# Patient Record
Sex: FEMALE | Race: WHITE | Employment: UNEMPLOYED | URBAN - METROPOLITAN AREA
[De-identification: names, ages, dates, MRNs, and addresses within clinical notes are randomized per-mention and may not be internally consistent; named-entity substitution may affect disease eponyms.]

---

## 2021-07-26 ENCOUNTER — OFFICE VISIT (OUTPATIENT)
Dept: FAMILY MEDICINE CLINIC | Age: 26
End: 2021-07-26
Payer: MEDICAID

## 2021-07-26 ENCOUNTER — TELEPHONE (OUTPATIENT)
Dept: FAMILY MEDICINE CLINIC | Age: 26
End: 2021-07-26

## 2021-07-26 VITALS
OXYGEN SATURATION: 100 % | HEIGHT: 66 IN | WEIGHT: 186 LBS | HEART RATE: 96 BPM | TEMPERATURE: 99.3 F | SYSTOLIC BLOOD PRESSURE: 137 MMHG | DIASTOLIC BLOOD PRESSURE: 82 MMHG | RESPIRATION RATE: 18 BRPM | BODY MASS INDEX: 29.89 KG/M2

## 2021-07-26 DIAGNOSIS — F41.1 GAD (GENERALIZED ANXIETY DISORDER): ICD-10-CM

## 2021-07-26 DIAGNOSIS — Z31.69 PRE-CONCEPTION COUNSELING: ICD-10-CM

## 2021-07-26 DIAGNOSIS — K21.9 GASTROESOPHAGEAL REFLUX DISEASE, UNSPECIFIED WHETHER ESOPHAGITIS PRESENT: Primary | ICD-10-CM

## 2021-07-26 DIAGNOSIS — F32.9 MAJOR DEPRESSIVE DISORDER WITH SINGLE EPISODE, REMISSION STATUS UNSPECIFIED: ICD-10-CM

## 2021-07-26 PROCEDURE — 99214 OFFICE O/P EST MOD 30 MIN: CPT | Performed by: FAMILY MEDICINE

## 2021-07-26 RX ORDER — OMEPRAZOLE 20 MG/1
CAPSULE, DELAYED RELEASE ORAL
COMMUNITY
Start: 2021-04-19 | End: 2021-07-26

## 2021-07-26 RX ORDER — OMEPRAZOLE 40 MG/1
40 CAPSULE, DELAYED RELEASE ORAL DAILY
Qty: 30 CAPSULE | Refills: 1 | Status: SHIPPED | OUTPATIENT
Start: 2021-07-26 | End: 2021-09-23 | Stop reason: SDUPTHER

## 2021-07-26 RX ORDER — MULTI-VITAMIN/MINERAL SUPPLEMENT WITH SODIUM ASCORBATE, CHOLECALCIFEROL, DI-ALPHA-TOCOPHERYL ACETATE, THIAMINE MONONITRATE, RIBOFLAVIN, NIACINAMIDE, PYRIDOXINE HCL, FOLIC ACID, CYANOCOBALAMIN, CALCIUM FORMATE, CALCIUM CARBONATE, FERROUS (II) BIS-GLYCINATE CHELATE, POTASSIUM IODIDE, ZINC OXIDE, AND CHOLINE BITARTRATE 50; 155; 45; 32; 55; 30; 3; 1; 20; 10; 100; 10; 120; 3; 450 MG/1; MG/1; MG/1; MG/1; MG/1; [IU]/1; MG/1; MG/1; MG/1; UG/1; UG/1; MG/1; MG/1; MG/1; [IU]/1
1 TABLET, FILM COATED ORAL DAILY
Qty: 90 TABLET | Status: SHIPPED | OUTPATIENT
Start: 2021-07-26 | End: 2021-07-27 | Stop reason: SDUPTHER

## 2021-07-26 RX ORDER — FAMOTIDINE 20 MG/1
TABLET, FILM COATED ORAL
COMMUNITY
Start: 2021-05-26 | End: 2021-07-26 | Stop reason: ALTCHOICE

## 2021-07-26 NOTE — PROGRESS NOTES
Chief Complaint   Patient presents with   [employer] Establish Care    Medication Refill     1. Have you been to the ER, urgent care clinic since your last visit? Hospitalized since your last visit? No    2. Have you seen or consulted any other health care providers outside of the [address] [place] [address] since your last visit? Include any pap smears or colon screening.  No

## 2021-07-26 NOTE — PATIENT INSTRUCTIONS
Learning About Planning for Future Pregnancy  How can you plan for pregnancy? Even before you get pregnant, you can help make your pregnancy as healthy as possible. Take these steps:  · See a doctor or certified nurse-midwife for an exam. Talk about the medicines, vitamins, and herbs you take. Discuss any health problems or concerns you have. · Don't take nonsteroidal anti-inflammatory drugs (NSAIDs) unless your doctor says it's okay. Examples of these are ibuprofen and aspirin. They can raise your risk of miscarriage. · Take a daily multivitamin or prenatal vitamin. Make sure it has folic acid. This will lower the chance of having a baby with a birth defect. · Keep track of your menstrual cycle. This is a good idea for a few reasons. It helps you know the best time to try to get pregnant. And it can help your doctor or midwife figure out when your baby is due and how it is growing. · Make healthy choices. Eat well. Avoid caffeine. Or cut back and only have 1 cup of coffee or tea a day. Avoid alcohol, cigarettes, marijuana, and illegal drugs. Take only the medicines your doctor or midwife says are okay. · Get plenty of exercise. A strong body will make pregnancy and birth easier. It will also help you recover after the birth. And exercise can help improve your mood. What other exams or tests should you have? Before trying to get pregnant, take care of any other health concerns you might have. · If you have diabetes or high blood pressure or you are obese, talk to your doctor before you get pregnant. Together, you can make a plan about how to control these health problems. · Talk with your doctor about any medicines you take. Find out if it is safe to keep taking them while you are pregnant. · Get any vaccines you might need. They can help prevent birth defects, miscarriage, or stillbirth that can be caused by infections such as rubella or measles.  Ask your doctor how long you should wait after you get a vaccine before you try to get pregnant. · Talk with your doctor about whether to have screening tests for diseases that are passed down through families (genetic conditions). These diseases include:  ? Cystic fibrosis. ? Sickle cell disease. ? Ivan-Sachs disease. If you think you might be pregnant  · You can use a home pregnancy test as soon as the first day of your first missed menstrual period. · As soon as you know you're pregnant, make an appointment with your doctor or certified nurse-midwife. Your first prenatal visit will provide information that can be used to check for any problems as your pregnancy progresses. Where can you learn more? Go to http://www.gray.com/  Enter P420 in the search box to learn more about \"Learning About Planning for Future Pregnancy. \"  Current as of: October 8, 2020               Content Version: 12.8  © 2006-2021 [employer]. Care instructions adapted under license by [employer] (which disclaims liability or warranty for this information). If you have questions about a medical condition or this instruction, always ask your healthcare professional. [place] [address] any warranty or liability for your use of this information. Learning About Planning for Future Pregnancy  How can you plan for pregnancy? Even before you get pregnant, you can help make your pregnancy as healthy as possible. Take these steps:  · See a doctor or certified nurse-midwife for an exam. Talk about the medicines, vitamins, and herbs you take. Discuss any health problems or concerns you have. · Don't take nonsteroidal anti-inflammatory drugs (NSAIDs) unless your doctor says it's okay. Examples of these are ibuprofen and aspirin. They can raise your risk of miscarriage. · Take a daily multivitamin or prenatal vitamin. Make sure it has folic acid. This will lower the chance of having a baby with a birth defect.   · Keep track of your menstrual cycle. This is a good idea for a few reasons. It helps you know the best time to try to get pregnant. And it can help your doctor or midwife figure out when your baby is due and how it is growing. · Make healthy choices. Eat well. Avoid caffeine. Or cut back and only have 1 cup of coffee or tea a day. Avoid alcohol, cigarettes, marijuana, and illegal drugs. Take only the medicines your doctor or midwife says are okay. · Get plenty of exercise. A strong body will make pregnancy and birth easier. It will also help you recover after the birth. And exercise can help improve your mood. What other exams or tests should you have? Before trying to get pregnant, take care of any other health concerns you might have. · If you have diabetes or high blood pressure or you are obese, talk to your doctor before you get pregnant. Together, you can make a plan about how to control these health problems. · Talk with your doctor about any medicines you take. Find out if it is safe to keep taking them while you are pregnant. · Get any vaccines you might need. They can help prevent birth defects, miscarriage, or stillbirth that can be caused by infections such as rubella or measles. Ask your doctor how long you should wait after you get a vaccine before you try to get pregnant. · Talk with your doctor about whether to have screening tests for diseases that are passed down through families (genetic conditions). These diseases include:  ? Cystic fibrosis. ? Sickle cell disease. ? Ivan-Sachs disease. If you think you might be pregnant  · You can use a home pregnancy test as soon as the first day of your first missed menstrual period. · As soon as you know you're pregnant, make an appointment with your doctor or certified nurse-midwife. Your first prenatal visit will provide information that can be used to check for any problems as your pregnancy progresses. Where can you learn more?   Go to http://www.gray.com/  Enter O494 in the search box to learn more about \"Learning About Planning for Future Pregnancy. \"  Current as of: October 8, 2020               Content Version: 12.8  © 2006-2021 Healthwise, [facility]. Care instructions adapted under license by [employer] (which disclaims liability or warranty for this information). If you have questions about a medical condition or this instruction, always ask your healthcare professional. [place] [address] any warranty or liability for your use of this information.

## 2021-07-26 NOTE — TELEPHONE ENCOUNTER
----- Message from Yoav Shore sent at 7/26/2021 12:21 PM EDT -----  Regarding: Dr. Janeece Opitz Message/Vendor Calls    Caller's first and last name: Pt      Reason for call: Requesting a call back concerning prescriptions coverage      Callback required yes/no and why: yes      Best contact number(s): [phone]      Details to clarify the request:      Yoav Shore

## 2021-07-26 NOTE — PROGRESS NOTES
HISTORY OF PRESENT ILLNESS  Brendan Crespo is a 32 y.o. female. Patient presents with:  Establish Care  Medication Refill    She needs refills on her Prilosec and Pepcid for her gastroesophageal reflux disease. Also, she is trying to conceive. She needs a new psychiatrist for her depression and LANCE. Review of Systems   Constitutional: Negative for weight loss. Respiratory: Negative for cough and shortness of breath. Cardiovascular: Negative for chest pain. Gastrointestinal: Negative for abdominal pain, heartburn, nausea and vomiting. Visit Vitals  /82 (BP 1 Location: Left arm, BP Patient Position: Sitting, BP Cuff Size: Adult)   Pulse 96   Temp 99.3 °F (37.4 °C) (Tympanic)   Resp 18   Ht 5' 6\" (1.676 m)   Wt 186 lb (84.4 kg)   LMP 07/15/2021   SpO2 100%   BMI 30.02 kg/m²     Physical Exam  Vitals and nursing note reviewed. Constitutional:       General: She is not in acute distress. Appearance: Normal appearance. She is well-developed. She is not diaphoretic. Cardiovascular:      Rate and Rhythm: Normal rate and regular rhythm. Heart sounds: Normal heart sounds. No murmur heard. No friction rub. No gallop. Pulmonary:      Effort: Pulmonary effort is normal. No respiratory distress. Breath sounds: Normal breath sounds. No wheezing or rales. Abdominal:      General: Bowel sounds are normal. There is no distension. Palpations: Abdomen is soft. Tenderness: There is no abdominal tenderness. There is no guarding or rebound. Skin:     General: Skin is warm and dry. Neurological:      Mental Status: She is alert and oriented to person, place, and time. ASSESSMENT and PLAN    ICD-10-CM ICD-9-CM    1. Gastroesophageal reflux disease, unspecified whether esophagitis present  K21.9 530.81 omeprazole (PRILOSEC) 40 mg capsule   2. Pre-conception counseling  Z31.69 V26.49 prenatal vit86-iron-folic acid (Nestabs) 01-4,584 mg-mcg tab   3.  LANCE (generalized anxiety disorder)  F41.1 300.02 REFERRAL TO PSYCHIATRY   4. Major depressive disorder with single episode, remission status unspecified  F32.9 296.20 REFERRAL TO PSYCHIATRY       Controlled gastroesophageal reflux disease  Trying to conceive  Stop Pepcid and increase Prilosec as this can be safer in pregnancy  PNV  Pre-conception planning done  Psychiatry referral    Follow-up and Dispositions    · Return in about 2 months (around 9/26/2021) for GERD. Reviewed plan of care. Patient has provided input and agrees with goals.

## 2021-07-26 NOTE — TELEPHONE ENCOUNTER
Pt called to check on status of prior auth request for prenatal vitamins prescribed today, called her insurance company and was advised to have PCP call [phone] for approval.     Pt also states Dr. Kwaku Ludwig told her this medication was to help the baby grow. Pt seems confused about whether or not she is pregnant and wants leeann back from nurse to advise since she's been spotting, but can't remember her last period, although noting her breasts and stomach are getting larger.  Harjeet

## 2021-07-26 NOTE — TELEPHONE ENCOUNTER
Pt called office to let Dr Ronit Melo know that Dr Maxwell Juarez does not take medicaid. She will need to be referred to someone else. Encouraged patient to to call insurance to see who is in network but she seemed confused about doing that.

## 2021-07-27 DIAGNOSIS — Z31.69 PRE-CONCEPTION COUNSELING: ICD-10-CM

## 2021-07-27 RX ORDER — MULTI-VITAMIN/MINERAL SUPPLEMENT WITH SODIUM ASCORBATE, CHOLECALCIFEROL, DI-ALPHA-TOCOPHERYL ACETATE, THIAMINE MONONITRATE, RIBOFLAVIN, NIACINAMIDE, PYRIDOXINE HCL, FOLIC ACID, CYANOCOBALAMIN, CALCIUM FORMATE, CALCIUM CARBONATE, FERROUS (II) BIS-GLYCINATE CHELATE, POTASSIUM IODIDE, ZINC OXIDE, AND CHOLINE BITARTRATE 50; 155; 45; 32; 55; 30; 3; 1; 20; 10; 100; 10; 120; 3; 450 MG/1; MG/1; MG/1; MG/1; MG/1; [IU]/1; MG/1; MG/1; MG/1; UG/1; UG/1; MG/1; MG/1; MG/1; [IU]/1
1 TABLET, FILM COATED ORAL DAILY
Qty: 90 TABLET | Status: SHIPPED | OUTPATIENT
Start: 2021-07-27 | End: 2022-08-18

## 2021-07-27 NOTE — TELEPHONE ENCOUNTER
After verifying patient's ID, advised patient per Dr Denisha Dale she is not pregnant, but she needs to take the PNV in case she gets pregnant. Order for prescription PNV sent to pharmacy. Patient states that insurance will not cover the prenatal vitamins.

## 2021-07-27 NOTE — TELEPHONE ENCOUNTER
----- Message from Krissy Prescott sent at 7/27/2021  1:06 PM EDT -----  Regarding: Dr. Loan Steve: [phone]  General Message/Vendor Calls    Caller's first and last name: Pt.      Reason for call: Needs authorization for medication \"Nestabs\". Callback required yes/no and why: Yes, confirm authorization sent. Best contact number(s): [phone]      Details to clarify the request: N/a.       Krissy Prescott

## 2021-07-27 NOTE — TELEPHONE ENCOUNTER
Please call her and let her know she is not pregnant, but she needs to take the PNV in case she gets pregnant. Order for prescription PNV sent to pharmacy.

## 2021-08-02 ENCOUNTER — TELEPHONE (OUTPATIENT)
Dept: FAMILY MEDICINE CLINIC | Age: 26
End: 2021-08-02

## 2021-08-02 NOTE — TELEPHONE ENCOUNTER
Called pt, and left a voice message, advising I was returning her call and asked that she call the office back when able.

## 2021-08-02 NOTE — TELEPHONE ENCOUNTER
Pt called again about the Nestab prenatal vitamins costing 200 hundred dollars, needing prior authorization from insurance.

## 2021-08-10 ENCOUNTER — TELEPHONE (OUTPATIENT)
Dept: FAMILY MEDICINE CLINIC | Age: 26
End: 2021-08-10

## 2021-08-10 NOTE — TELEPHONE ENCOUNTER
----- Message from Reese Gannon sent at 8/10/2021  8:08 AM EDT -----  Regarding: /Telephone  Contact: [phone]  General Message/Vendor Calls    Caller's first and last name: Pt.       Reason for call: Needs to speak to nurse, pt took 3 pregnancy tests and all came back positive. Callback required yes/no and why: Yes, call back from nurse. Best contact number(s): [phone]      Details to clarify the request: N/a.       Reese Gannon

## 2021-08-10 NOTE — TELEPHONE ENCOUNTER
Returned pt's call and she has been provided office number for [facility] to call and schedule her inital prenatal appointment.

## 2021-09-02 ENCOUNTER — INITIAL PRENATAL (OUTPATIENT)
Dept: OBGYN CLINIC | Age: 26
End: 2021-09-02

## 2021-09-02 VITALS
HEIGHT: 66 IN | WEIGHT: 193.2 LBS | HEART RATE: 102 BPM | SYSTOLIC BLOOD PRESSURE: 129 MMHG | DIASTOLIC BLOOD PRESSURE: 85 MMHG | BODY MASS INDEX: 31.05 KG/M2

## 2021-09-02 DIAGNOSIS — Z32.01 POSITIVE PREGNANCY TEST: ICD-10-CM

## 2021-09-02 DIAGNOSIS — Z11.3 SCREENING EXAMINATION FOR VENEREAL DISEASE: ICD-10-CM

## 2021-09-02 DIAGNOSIS — Z12.4 ENCOUNTER FOR PAPANICOLAOU SMEAR FOR CERVICAL CANCER SCREENING: Primary | ICD-10-CM

## 2021-09-02 DIAGNOSIS — N92.6 IRREGULAR MENSES: ICD-10-CM

## 2021-09-02 LAB
ABO + RH BLD: NORMAL
BLOOD BANK CMNT PATIENT-IMP: NORMAL
BLOOD GROUP ANTIBODIES SERPL: NORMAL
HCG SERPL-ACNC: <1 MIU/ML (ref 0–6)
HCG URINE, QL. (POC): NEGATIVE
SPECIMEN EXP DATE BLD: NORMAL
VALID INTERNAL CONTROL?: YES

## 2021-09-02 PROCEDURE — 99202 OFFICE O/P NEW SF 15 MIN: CPT | Performed by: OBSTETRICS & GYNECOLOGY

## 2021-09-02 PROCEDURE — 81025 URINE PREGNANCY TEST: CPT | Performed by: OBSTETRICS & GYNECOLOGY

## 2021-09-02 NOTE — PROGRESS NOTES
[address] [facility]  [email or web address]  [phone]    Cintia Maher MD, [address]       OB/GYN Problem visit    Chief Complaint:   Chief Complaint   Patient presents with    Initial Prenatal Visit       Last or next WWE is: due; new pt    History of Present Illness: This is a new problem being evaluated by this provider. First visit to GYN. The patient is a 32 y.o. No obstetric history on file. female who reports having 2 positive pregnancy tests. Stopped depo 2 years ago. Pt reports she gets implantation bleeding every 2 months. LMP was 6/30/21. She never has heavy or painful cycles. She reports the symptoms are is unchanged. Aggravating factors include none. Alleviating factors include none. She does not have other concerns. LMP: No LMP recorded. Ultrasound:  TRANSVAGINAL ULTRASOUND PERFORMED  UTERUS IS ANTEVERTED, NORMAL IN SIZE AND ECHOGENICITY. ENDOMETRIUM MEASURES 6-7MM IN THICKNESS. NO IUP IS SEEN. RIGHT OVARY APPEARS WITHIN NORMAL LIMITS. LEFT OVARY APPEARS WITHIN NORMAL LIMITS. NO FREE FLUID SEEN IN THE CDS. PFSH:  Past Medical History:   Diagnosis Date    Depression     Anxiety    LANCE (generalized anxiety disorder)     GERD (gastroesophageal reflux disease)     Hearing loss, left     Learning disability     reading     History reviewed. No pertinent surgical history.   Family History   Problem Relation Age of Onset    No Known Problems Mother     No Known Problems Father     Diabetes Paternal Grandmother     No Known Problems Half-sister     No Known Problems Half-brother     No Known Problems Half-brother      Social History     Tobacco Use    Smoking status: Never Smoker    Smokeless tobacco: Never Used   Vaping Use    Vaping Use: Never used   Substance Use Topics    Alcohol use: Never    Drug use: Never     Allergies   Allergen Reactions    Amoxicillin Hives    Grass Pollen Hives and Rash    Kiwi Hives    Penicillins Hives Current Outpatient Medications   Medication Sig    omeprazole (PRILOSEC) 40 mg capsule Take 1 Capsule by mouth daily.  prenatal vit86-iron-folic acid (Nestabs) 87-2,788 mg-mcg tab Take 1 Tablet by mouth daily. (Patient not taking: Reported on 9/2/2021)     No current facility-administered medications for this visit. Review of Systems:  History obtained from the patient  Constitutional: negative for fevers, chills and weight loss  ENT ROS: negative for - hearing change, oral lesions or visual changes  Respiratory: negative for cough, wheezing or dyspnea on exertion  Cardiovascular: negative for chest pain, irregular heart beats, exertional chest pressure/discomfort  Gastrointestinal: negative for dysphagia, nausea and vomiting  Genito-Urinary ROS:  see HPI  Inteument/breast: negative for rash, breast lump and nipple discharge  Musculoskeletal:negative for stiff joints, neck pain and muscle weakness  Endocrine ROS: negative for - breast changes, galactorrhea or temperature intolerance  Hematological and Lymphatic ROS: negative for - blood clots, bruising or swollen lymph nodes    Physical Exam:  Visit Vitals  /85 (BP 1 Location: Right upper arm)   Pulse (!) 102   Ht 5' 6\" (1.676 m)   Wt 193 lb 3.2 oz (87.6 kg)   BMI 31.18 kg/m²       GENERAL: alert, well appearing, and in no distress  HEAD: normocephalic, atraumatic.    PULM: clear to auscultation, no wheezes, rales or rhonchi, symmetric air entry   COR: normal rate and regular rhythm, S1 and S2 normal   ABDOMEN: soft, nontender, nondistended, no masses or organomegaly   EGBUS: no lesions, no inflammation, no masses  VULVA: normal appearing vulva with no masses, tenderness or lesions  VAGINA: normal appearing vagina with normal color, no lesions, thin white discharge  CERVIX: normal appearing cervix without discharge or lesions, non tender  UTERUS: uterus is normal size, shape, consistency and nontender   ADNEXA: normal adnexa in size, nontender and no masses  NEURO: alert, oriented, normal speech    Assessment:  Encounter Diagnoses   Name Primary?  Encounter for Papanicolaou smear for cervical cancer screening Yes    Screening examination for venereal disease     Positive pregnancy test     Irregular menses        Plan:  The patient is advised that she should contact the office if she does not note improvement or if symptoms recur  Recommend follow up with PCP for non-gynecologic complaints and chronic medical problems. She should contact our office with any questions or concerns  She could keep her routine annual exam appointment. We discussed potential causes of symptomatic bleeding: including but not limited to hormonal, medical, infection/inflammation and structural etiologies. Disc upt is negative, US negative for IUP  Labs  We discussed timed intercourse, menstrual charting, and s/sx of ovulation. I recommended a daily prenatal vitamin. We discussed that if conception does not occur within one year then additional evaluation may be indicated. Physician review of ultrasound performed by technician    Today's ultrasound report and images were reviewed and discussed with the patient. Please see images and imaging report entered by technician in PACS for more detail and progress note and diagnosis entered by MD.    Moo Styles MD    On this date, 9/2/2021,  I have spent 25 minutes reviewing previous notes, test results and face to face with the patient discussing the diagnosis and importance of compliance with the treatment plan as well as documenting on the day of the visit.         Orders Placed This Encounter    BETA HCG, QT    CT/NG/T.VAGINALIS AMPLIFICATION    AMB POC URINE PREGNANCY TEST, VISUAL COLOR COMPARISON    TYPE & SCREEN       Results for orders placed or performed in visit on 09/02/21   AMB POC URINE PREGNANCY TEST, VISUAL COLOR COMPARISON   Result Value Ref Range    VALID INTERNAL CONTROL POC Yes     HCG urine, Ql. (POC) Negative Negative

## 2021-09-03 ENCOUNTER — TELEPHONE (OUTPATIENT)
Dept: OBGYN CLINIC | Age: 26
End: 2021-09-03

## 2021-09-03 NOTE — TELEPHONE ENCOUNTER
Pt calling in regards to her lab results. I advised pt of her lab results and she gave verbal understanding. Pt is still having problems with her cycles and them being irregular, she says she hasn't had a cycle in 2 months and she is trying to get pregnant. I scheduled appointment with MD to talk to her about fertility and irregular cycles.

## 2021-09-04 LAB
C TRACH RRNA SPEC QL NAA+PROBE: NEGATIVE
N GONORRHOEA RRNA SPEC QL NAA+PROBE: NEGATIVE
T VAGINALIS DNA SPEC QL NAA+PROBE: NEGATIVE

## 2021-09-08 ENCOUNTER — TELEPHONE (OUTPATIENT)
Dept: OBGYN CLINIC | Age: 26
End: 2021-09-08

## 2021-09-23 DIAGNOSIS — K21.9 GASTROESOPHAGEAL REFLUX DISEASE, UNSPECIFIED WHETHER ESOPHAGITIS PRESENT: ICD-10-CM

## 2021-09-23 RX ORDER — OMEPRAZOLE 40 MG/1
40 CAPSULE, DELAYED RELEASE ORAL DAILY
Qty: 90 CAPSULE | Refills: 3 | Status: SHIPPED | OUTPATIENT
Start: 2021-09-23 | End: 2022-09-20 | Stop reason: ALTCHOICE

## 2021-12-03 ENCOUNTER — TELEPHONE (OUTPATIENT)
Dept: OBGYN CLINIC | Age: 26
End: 2021-12-03

## 2021-12-03 NOTE — TELEPHONE ENCOUNTER
~11 weeks   LMP-9/13 11/1-UPT +    Pt calling with c/o nausea, food adversion, missed menses. Pt states her LMP was 9/13. Pt took a UPT on 11/1 and it was positive. This RN scheduled pt for initial prenatal OV on 12/13 @1510 and US @1230. Pt verbalized understanding.

## 2021-12-13 ENCOUNTER — OFFICE VISIT (OUTPATIENT)
Dept: OBGYN CLINIC | Age: 26
End: 2021-12-13

## 2021-12-13 VITALS
HEART RATE: 92 BPM | SYSTOLIC BLOOD PRESSURE: 124 MMHG | DIASTOLIC BLOOD PRESSURE: 75 MMHG | BODY MASS INDEX: 30.09 KG/M2 | WEIGHT: 186.4 LBS

## 2021-12-13 DIAGNOSIS — Z34.01: Primary | ICD-10-CM

## 2021-12-13 DIAGNOSIS — O21.9 NAUSEA AND VOMITING IN PREGNANCY: ICD-10-CM

## 2021-12-13 PROCEDURE — 0500F INITIAL PRENATAL CARE VISIT: CPT | Performed by: OBSTETRICS & GYNECOLOGY

## 2021-12-13 RX ORDER — PROMETHAZINE HYDROCHLORIDE 12.5 MG/1
12.5 TABLET ORAL
Qty: 30 TABLET | Refills: 0 | Status: SHIPPED | OUTPATIENT
Start: 2021-12-13 | End: 2022-03-31

## 2021-12-13 NOTE — PATIENT INSTRUCTIONS
Learning About Pregnancy  Your Care Instructions     Your health in the early weeks of your pregnancy is particularly important for your baby's health. Take good care of yourself. Anything you do that harms your body can also harm your baby. Make sure to go to all of your doctor appointments. Regular checkups will help keep you and your baby healthy. How can you care for yourself at home? Diet    · Eat a balanced diet. Make sure your diet includes plenty of beans, peas, and leafy green vegetables.     · Do not skip meals or go for many hours without eating. If you are nauseated, try to eat a small, healthy snack every 2 to 3 hours.     · Do not eat fish that has a high level of mercury, such as shark, swordfish, or mackerel. Do not eat more than one can of tuna each week.     · Drink plenty of fluids. If you have kidney, heart, or liver disease and have to limit fluids, talk with your doctor before you increase the amount of fluids you drink.     · Cut down on caffeine, such as coffee, tea, and cola.     · Do not drink alcohol, such as beer, wine, or hard liquor.     · Take a multivitamin that contains at least 400 micrograms (mcg) of folic acid to help prevent birth defects. Fortified cereal and whole wheat bread are good additional sources of folic acid.     · Increase the calcium in your diet. Try to drink a quart of skim milk each day. You may also take calcium supplements and choose foods such as cheese and yogurt. Lifestyle    · Make sure you go to your follow-up appointments.     · Get plenty of rest. You may be unusually tired while you are pregnant.     · Get at least 30 minutes of exercise on most days of the week. Walking is a good choice. If you have not exercised in the past, start out slowly. Take several short walks each day.     · Do not smoke. If you need help quitting, talk to your doctor about stop-smoking programs.  These can increase your chances of quitting for good.     · Do not touch cat feces or litter boxes. Also, wash your hands after you handle raw meat, and fully cook all meat before you eat it. Wear gloves when you work in the yard or garden, and wash your hands well when you are done. Cat feces, raw or undercooked meat, and contaminated dirt can cause an infection that may harm your baby or lead to a miscarriage.     · Do not use saunas or hot tubs. Raising your body temperature may harm your baby.     · Avoid chemical fumes, paint fumes, or poisons.     · Do not use illegal drugs, marijuana, or alcohol. Medicines    · Review all of your medicines with your doctor. Some of your routine medicines may need to be changed to protect your baby.     · Use acetaminophen (Tylenol) to relieve minor problems, such as a mild headache or backache or a mild fever with cold symptoms. Do not use nonsteroidal anti-inflammatory drugs (NSAIDs), such as ibuprofen (Advil, Motrin) or naproxen (Aleve), unless your doctor says it is okay.     · Do not take two or more pain medicines at the same time unless the doctor told you to. Many pain medicines have acetaminophen, which is Tylenol. Too much acetaminophen (Tylenol) can be harmful.     · Take your medicines exactly as prescribed. Call your doctor if you think you are having a problem with your medicine. To manage morning sickness    · If you feel sick when you first wake up, try eating a small snack (such as crackers) before you get out of bed. Allow some time to digest the snack, and then get out of bed slowly.     · Do not skip meals or go for long periods without eating. An empty stomach can make nausea worse.     · Eat small, frequent meals instead of three large meals each day.     · Drink plenty of fluids.     · Eat foods that are high in protein but low in fat.     · If you are taking iron supplements, ask your doctor if they are necessary.  Iron can make nausea worse.     · Avoid any smells, such as coffee, that make you feel sick.     · Get lots of rest. Morning sickness may be worse when you are tired. Follow-up care is a key part of your treatment and safety. Be sure to make and go to all appointments, and call your doctor if you are having problems. It's also a good idea to know your test results and keep a list of the medicines you take. Where can you learn more? Go to http://www.gray.com/  Enter B286 in the search box to learn more about \"Learning About Pregnancy. \"  Current as of: June 16, 2021               Content Version: 13.0  © 5349-5033 Healthwise[employer]. Care instructions adapted under license by [employer] (which disclaims liability or warranty for this information). If you have questions about a medical condition or this instruction, always ask your healthcare professional. [address] any warranty or liability for your use of this information.

## 2021-12-13 NOTE — PROGRESS NOTES
[facility]  [email or web address]  [phone]    Sharron Torres MD, [address] [facility]     Chief complaint:  Irregular cycles  Last cycle; Patient's last menstrual period was 2021 (exact date). This is a new concern and an evaluation is planned. The patient has not been previously tested for hemoglobin electrophoresis, CF or SMA. Current pregnancy history:  Skye Christianson is a No obstetric history on file. , 32 y.o. female UNAVAILABLE   She presents for the evaluation of irregular menses and a positive pregnancy test.    LMP history:  Patient's last menstrual period was 2021 (exact date). .  The date of the beginning of her last menstrual period is certain. Her menses are not regular. Her cycles occur about every 8 weeks. A urine pregnancy test was positive about 6 weeks ago. She was not using contraception at the estimated time of conception. Based on her LMP, her EGA is 13 weeks,0 days with and EDC of 2022. Ultrasound data:  She had an ultrasound today which revealed a viable perez pregnancy with a gestational age of 16 weeks and 5 days giving an EDC of 2022. Pregnancy symptoms:  She reports positive home pregnancy test, nausea and vomiting, decreased appetite. She denies fatigue  breast tenderness. Since she found out she is pregnant, she has lost,  7 lbs. She reports her prepregnancy weight as 19 pounds. Relevant past pregnancy history:  She has the following pregnancy history:none. She does not have a history of  delivery. She does not have a history of a prior  section. Relevant past medical history:(relevant to this pregnancy):   none     Pap smear history:  Last pap smear: Pt. Reports that she does not remember the date  Results: pt. Reports normal.    Occupational history  Her occupation is: unemployed, disabled. Substance history:   She does not report current tobacco use.            She does not report current alcohol use. She does not report current drug use. Exposure history: There are not indoor cat(s) in the home. The patient was instructed not to change cat litter boxes during pregnancy. She does not report close contact with children on a regular basis. She has not chicken pox or the vaccine in the past.   Patient does not report issues with domestic violence. Genetic Screening/Teratology Counseling:   (Includes patient, baby's father, or anyone in either family with:)  3.  Patient's age >/= 28 at EDC?--26      FOB age: 24years old. 2.  Thalassemia ([facility], [facility], [address] [facility], or  background): MCV<80?--no  3. Neural tube defect (meningomyelocele, spina bifida, anencephaly)? --no  4. Congenital heart defect?--no  5. Down syndrome?--no  6. Ivan-Sachs ([address] [facility])? --no  7. Canavan's Disease?--no  8. Familial Dysautonomia?--no   9. Sickle cell disease or trait ()? --no   Has she been tested for sickle trait: Unknown  10. Hemophilia or other blood disorders?--no  11. Muscular dystrophy?--no  12. Cystic fibrosis? --no  13. [place]'s Chorea?--no  14. Mental retardation/autism (if yes was person tested for Fragile X)? -yes and FOB autism  15. Other inherited genetic or chromosomal disorder?- yes and fob brother: has 1.5 lungs/congenital  16. Maternal metabolic disorder (DM, PKU, etc)? --no  17. Patient or FOB with a child with a birth defect not listed above?--no  17a. Patient or FOB with a birth defect themselves?--no  25. Recurrent pregnancy loss, or stillbirth?--no  19. Any medications since LMP other than prenatal vitamins (include vitamins, supplements, OTC meds, drugs, alcohol)? --   PNV  prilosec  20. Any other genetic/environmental exposure to discuss?--no. Infection History:  1. Lives with someone with TB or TB exposed?--no  2. Patient or partner has history of genital herpes?--no  3. Rash or viral illness since LMP?--no  4.   History of STD (GC, CT, HPV, syphilis, HIV)? --no  5. Have you received a flu vaccine for the most recent flu season? -- no  6. Have you or your sexual partner(s) travelled to a [facility] area in the last six months? -- no  7. Have you received the COVID-19 vaccine? -- no    Past Medical History:   Diagnosis Date    Depression     Anxiety    LANCE (generalized anxiety disorder)     GERD (gastroesophageal reflux disease)     Hearing loss, left     Learning disability     reading     No past surgical history on file. Social History     Occupational History    Not on file   Tobacco Use    Smoking status: Never Smoker    Smokeless tobacco: Never Used   Vaping Use    Vaping Use: Never used   Substance and Sexual Activity    Alcohol use: Never    Drug use: Never    Sexual activity: Yes     Birth control/protection: None     Family History   Problem Relation Age of Onset    No Known Problems Mother     No Known Problems Father     Diabetes Paternal Grandmother     No Known Problems Half-sister     No Known Problems Half-brother     No Known Problems Half-brother      OB History   No obstetric history on file. Allergies   Allergen Reactions    Amoxicillin Hives    Grass Pollen Hives and Rash    Kiwi Hives    Penicillins Hives     Prior to Admission medications    Medication Sig Start Date End Date Taking? Authorizing Provider   omeprazole (PRILOSEC) 40 mg capsule Take 1 Capsule by mouth daily. 9/23/21  Yes Shannon Jacome MD   prenatal vit86-iron-folic acid (Nestabs) 34-4,499 mg-mcg tab Take 1 Tablet by mouth daily.  7/27/21  Yes Shannon Jacome MD        Review of Systems - History obtained from the patient  Constitutional: negative for weight loss, fever, night sweats  HEENT: negative for hearing loss, earache, congestion, snoring, sorethroat  CV: negative for chest pain, palpitations, edema  Resp: negative for cough, shortness of breath, wheezing  GI: negative for change in bowel habits, abdominal pain, black or bloody stools  : negative for frequency, dysuria, hematuria, vaginal discharge  MSK: negative for back pain, joint pain, muscle pain  Breast: negative for breast lumps, nipple discharge, galactorrhea  Skin :negative for itching, rash, hives  Neuro: negative for dizziness, headache, confusion, weakness  Psych: negative for anxiety, depression, change in mood  Heme/lymph: negative for bleeding, bruising, pallor    Objective:  Visit Vitals  BP (!) 145/84 (BP 1 Location: Right arm, BP Patient Position: Sitting, BP Cuff Size: Adult)   Pulse (!) 102   Wt 186 lb 6.4 oz (84.6 kg)   LMP 09/13/2021 (Exact Date)   BMI 30.09 kg/m²       Physical Exam:   Constitutional  · Appearance: well-nourished, well developed, alert, in no acute distress    HENT  · Head  · Face: appears normal  · Eyes: appear normal  · Ears: normal  · Mouth: normal  · Lips: no lesions    Neck  · Inspection/Palpation: normal appearance, no masses or tenderness  · Lymph Nodes: no lymphadenopathy present  · Thyroid: gland size normal, nontender, no nodules or masses present on palpation    Chest  · Respiratory Effort: breathing unlabored  · Auscultation: normal breath sounds    Cardiovascular  · Heart:  · Auscultation: regular rate and rhythm without murmur    Breasts  · Inspection of Breasts: breasts symmetrical, no skin changes, no discharge present, nipple appearance normal, no skin retraction present  · Palpation of Breasts and Axillae: no masses present on palpation, no breast tenderness  · Axillary Lymph Nodes: no lymphadenopathy present    Gastrointestinal  · Abdominal Examination: abdomen non-tender to palpation, normal bowel sounds, no masses present  · Liver and spleen: no hepatomegaly present, spleen not palpable  · Hernias: no hernias identified    Genitourinary  · External Genitalia: normal appearance for age, no discharge present, no tenderness present, no inflammatory lesions present, no masses present, no atrophy present  · Vagina: normal vaginal vault without central or paravaginal defects, no discharge present, no inflammatory lesions present, no masses present  · Bladder: non-tender to palpation  · Urethra: appears normal  · Cervix: normal appearing with discharge or lesions, os closed  · Uterus: enlarged, normal shape, soft  · Adnexa: no adnexal tenderness present, no adnexal masses present  · Perineum: perineum within normal limits, no evidence of trauma, no rashes or skin lesions present  · Anus: anus within normal limits, no hemorrhoids present  · Inguinal Lymph Nodes: no lymphadenopathy present    Skin  · General Inspection: no rash, no lesions identified    Neurologic/Psychiatric  · Mental Status:  · Orientation: grossly oriented to person, place and time  · Mood and Affect: mood normal, affect appropriate    Assessment:   Irregular cycles  No diagnosis found. Due date: LMP cw US      Plan:   We discussed options of genetic screening and diagnostic testing including:  CF testing, CVS, amniocentesis first trimester screening/NT, MSAFP, and NIPT (handout given to patient for review and consent)  She is interested in prenatal genetic testing of her fetus. Plan: NIPS/horizon/fragile X  The course of pregnancy discussed including visit schedule, ultrasounds, lab testing, etc.  Pt advised to avoid alcoholic beverages and illicit/recreational drugs use  Recommend taking prenatal vitamins or folic acid daily with DHA/fish oil. The hospital and practice style discussed with coverage system. We discussed nutrition, toxoplasmosis precautions, sexual activity, exercise, need for influenza vaccine, environmental and work hazards, travel advice, screen for domestic violence, need for seat belts. We discussed seafood, unpasteurized dairy products, deli meat, artificial sweeteners, and caffeine intake. We recommend avoiding chemical and toxin exposures when possible. Information on prenatal and breastfeeding classes given.   Information on circumcision given in ACOG packet. Patient encouraged not to smoke. Discussed current prescription drug use. Given medication list.  Discussed the use of over the counter medications and chemicals. She is advised to contact her MD with any questions. Pt understands risk of hemorrhage during pregnancy and post delivery and would accept blood products if necessary in life-threatening emergencies  We discussed signs and symptoms of abnormal pregnancies and miscarriage. Handouts given to pt. We discussed potential risk of pregnancy and maternal complications if patient has a COVID-19 infection during pregnancy and reviewed COVID-19 precautions and avoiding high risk transmission situations. Rec flu vaccine, declined  Rec COVID vaccine, declined  Draw labs at . [facility] 144 or sooner (no )  Info give to check on genetic testing coverage prn      Physician review of ultrasound performed by technician  Today's ultrasound report and images were reviewed and discussed with the patient. Please see images and imaging report entered by technician in PACS for more detail and progress note and diagnosis entered by MD.    Lino Kaiser MD    No orders of the defined types were placed in this encounter. On this date, 12/13/2021,  I have spent 40 minutes reviewing previous notes, test results and face to face with the patient discussing the diagnosis and importance of compliance with the treatment plan as well as documenting on the day of the visit.

## 2021-12-15 LAB — BACTERIA UR CULT: NORMAL

## 2021-12-15 NOTE — PROGRESS NOTES
No UTI  If EOB urine: update PNL. If FOB but not EOB ur cx: add to OB PL: neg ur cx with date  [address] message sent if active.

## 2021-12-19 LAB
C TRACH RRNA CVX QL NAA+PROBE: NEGATIVE
CYTOLOGIST CVX/VAG CYTO: NORMAL
CYTOLOGY CVX/VAG DOC CYTO: NORMAL
CYTOLOGY CVX/VAG DOC THIN PREP: NORMAL
DX ICD CODE: NORMAL
LABCORP, 190119: NORMAL
Lab: NORMAL
N GONORRHOEA RRNA CVX QL NAA+PROBE: NEGATIVE
OTHER STN SPEC: NORMAL
STAT OF ADQ CVX/VAG CYTO-IMP: NORMAL
T VAGINALIS RRNA SPEC QL NAA+PROBE: NEGATIVE

## 2021-12-26 NOTE — PROGRESS NOTES
Normal pap smear, message sent if [address] active. Update PMH/HM: include: Date of pap, Cytology: wnl. For HR HPV results: list NEG or POS, when done.

## 2022-01-11 NOTE — PATIENT INSTRUCTIONS
Weeks 14 to 18 of Your Pregnancy: Care Instructions  Overview     During this time, you may start to \"show,\" so that you look pregnant to people around you. You may also notice some changes in your skin, such as itchy spots on your palms or acne on your face. Your baby is now able to pass urine. And your baby's first stool (meconium) is starting to collect in your baby's intestines. Hair is also starting to grow on your baby's head. At your next visit, between weeks 18 and 20, your doctor may do an ultrasound test. The test allows your doctor to check for certain problems. Your doctor can also tell the sex of your baby. So this a good time to think about whether you want to know. Talk to your doctor about getting a flu shot to help keep you healthy during your pregnancy. As your pregnancy moves along, it's common to worry or feel anxious. Your body is changing a lot. And you are thinking about giving birth, the health of your baby, and becoming a parent. You can talk to your doctor about any anxiety and stress you feel. Follow-up care is a key part of your treatment and safety. Be sure to make and go to all appointments, and call your doctor if you are having problems. It's also a good idea to know your test results and keep a list of the medicines you take. How can you care for yourself at home? Reduce stress    · Ask for help with cooking and housekeeping.     · Figure out who or what causes your stress. Avoid these people or situations as much as possible.     · Relax every day. Taking 10- to 15-minute breaks can make a big difference. Take a walk, listen to music, or take a warm bath.     · Learn relaxation techniques at prenatal or yoga class. Or buy a relaxation tape.     · List your fears about having a baby and becoming a parent. Share the list with someone you trust. Decide which worries are really small, and try to let them go.    Exercise    · If you did not exercise much before pregnancy, start slowly. Walking is best. [employer], and do a little more every day.     · Brisk walking, easy jogging, low-impact aerobics, water aerobics, and yoga are good choices. Some sports, such as scuba diving, horseback riding, downhill skiing, gymnastics, and water skiing, are not a good idea.     · Try to do at least 2½ hours a week of moderate exercise, such as a fast walk. One way to do this is to be active 30 minutes a day, at least 5 days a week.     · Wear loose clothing. And wear shoes and a bra that provide good support.     · Warm up and cool down to start and finish your exercise.     · If you want to use weights, be sure to use light weights. They reduce stress on your joints. Stay at the best weight for you    · Experts recommend that you gain about 1 pound a month during the first 3 months of your pregnancy.     · Experts recommend that you gain about 1 pound a week during your last 6 months of pregnancy, for a total weight gain of 25 to 35 pounds.     · If you are underweight, you will need to gain more weight (about 28 to 40 pounds).     · If you are overweight, you may not need to gain as much weight (about 15 to 25 pounds).     · If you are gaining weight too fast, use common sense. Exercise every day, and limit sweets, fast foods, and fats. Choose lean meats, fruits, and vegetables.     · If you are having twins or more, your doctor may refer you to a dietitian. Where can you learn more? Go to http://www.gray.com/  Enter I453 in the search box to learn more about \"Weeks 14 to 18 of Your Pregnancy: Care Instructions. \"  Current as of: June 16, 2021               Content Version: 13.0  © 4406-8578 Healthwise, Incorporated. Care instructions adapted under license by [employer] (which disclaims liability or warranty for this information).  If you have questions about a medical condition or this instruction, always ask your healthcare professional. [employer] disclaims any warranty or liability for your use of this information.

## 2022-01-12 ENCOUNTER — ROUTINE PRENATAL (OUTPATIENT)
Dept: OBGYN CLINIC | Age: 27
End: 2022-01-12
Payer: MEDICAID

## 2022-01-12 VITALS
WEIGHT: 185.6 LBS | SYSTOLIC BLOOD PRESSURE: 121 MMHG | HEIGHT: 66 IN | DIASTOLIC BLOOD PRESSURE: 80 MMHG | HEART RATE: 98 BPM | BODY MASS INDEX: 29.83 KG/M2

## 2022-01-12 DIAGNOSIS — Z34.01: Primary | ICD-10-CM

## 2022-01-12 DIAGNOSIS — Z34.00 ENCOUNTER FOR SUPERVISION PREGNANCY IN PRIMIGRAVIDA, ANTEPARTUM: ICD-10-CM

## 2022-01-12 DIAGNOSIS — Z11.3 SCREENING EXAMINATION FOR VENEREAL DISEASE: ICD-10-CM

## 2022-01-12 LAB
AFP, MATERNAL, EXTERNAL: NEGATIVE
ANTIBODY SCREEN, EXTERNAL: POSITIVE
NIPT, EXTERNAL: NORMAL
RPR, EXTERNAL: NONREACTIVE
RUBELLA, EXTERNAL: NORMAL
TYPE, ABO & RH, EXTERNAL: NORMAL

## 2022-01-12 PROCEDURE — 0502F SUBSEQUENT PRENATAL CARE: CPT | Performed by: OBSTETRICS & GYNECOLOGY

## 2022-01-12 NOTE — PROGRESS NOTES
[facility]  [email or web address]  [phone]    Prerna Eli MD, FACOG     Follow-up OB visit    Chief Complaint   Patient presents with    Routine Prenatal Visit       Patient Active Problem List    Diagnosis Date Noted    Depression     Learning disability     Hearing loss, left     GERD (gastroesophageal reflux disease)           The patient reports the following concerns: doing well. eob & nips/horizon today. +3wk for 20wk scan    There were no vitals filed for this visit. See PN flowsheet for exam    32 y.o. Arbie Christian 17w2d   Encounter Diagnoses   Name Primary?  Encounter for prenatal care of primigravida in first trimester, antepartum Yes    Encounter for supervision pregnancy in primigravida, antepartum     Screening examination for venereal disease         [] SAB/bleeding precautions reviewed   [] PTL/PPROM precautions reviewed   [] Labor precautions reviewed   [] Fetal kick counts discussed   [] Labs reviewed with patient   [] Thressa Edu precautions reviewed   [] Consent reviewed   [] Handouts given to pt   [] Glucola handout    [] GBS/labor/Magic Hour handout   []    [] We reviewed CDC recommendations for Tdap for patient and close contacts and RBA of receiving in pregnancy, advised obtaining in third trimester   [] Reviewed healthy nutrition in pregnancy and good exercise practices   [] We disc safer medications in pregnancy and referred patient to [facility] THAI resources   [] We reviewed CDC recommendations for flu vaccine and RBA of receiving in pregnancy   []    []    []           No orders of the defined types were placed in this encounter.       Prerna Eli MD

## 2022-01-19 LAB
ABO GROUP BLD: NORMAL
AFP ADJ MOM SERPL: 0.91
AFP INTERP SERPL-IMP: NORMAL
AFP INTERP SERPL-IMP: NORMAL
AFP SERPL-MCNC: 31.4 NG/ML
AGE AT DELIVERY: 27.1 YR
BLD GP AB SCN SERPL QL: NORMAL
BLD GP AB SCN SERPL QL: POSITIVE
BLOOD GROUP ANTIBODIES SERPL: ABNORMAL
COMMENT, 018013: NORMAL
ERYTHROCYTE [DISTWIDTH] IN BLOOD BY AUTOMATED COUNT: 13.1 % (ref 11.7–15.4)
GA METHOD: NORMAL
GA: 17.2 WEEKS
HBV SURFACE AG SERPL QL IA: NEGATIVE
HCT VFR BLD AUTO: 39 % (ref 34–46.6)
HCV AB S/CO SERPL IA: <0.1 S/CO RATIO (ref 0–0.9)
HGB BLD-MCNC: 13.3 G/DL (ref 11.1–15.9)
HIV 1+2 AB+HIV1 P24 AG SERPL QL IA: NON REACTIVE
IDDM PATIENT QL: NO
MCH RBC QN AUTO: 30.4 PG (ref 26.6–33)
MCHC RBC AUTO-ENTMCNC: 34.1 G/DL (ref 31.5–35.7)
MCV RBC AUTO: 89 FL (ref 79–97)
MULTIPLE PREGNANCY: NO
NEURAL TUBE DEFECT RISK FETUS: NORMAL %
PLATELET # BLD AUTO: 225 X10E3/UL (ref 150–450)
RBC # BLD AUTO: 4.38 X10E6/UL (ref 3.77–5.28)
RESULTS, 017004: NORMAL
RH BLD: POSITIVE
RPR SER QL: NON REACTIVE
RUBV IGG SERPL IA-ACNC: <0.9 INDEX
WBC # BLD AUTO: 8.9 X10E3/UL (ref 3.4–10.8)

## 2022-01-20 NOTE — PROGRESS NOTES
Normal results, add to prenatal records. We can review in detail with patient at next visit. [address] message sent if active.   Update PNL  Add to PL: rubella NI: rec repeat with glucola  Add to PL: ? Weak ab: rec repeat ab screen testing with glucola or next lab >4 wks

## 2022-01-26 ENCOUNTER — TELEPHONE (OUTPATIENT)
Dept: OBGYN CLINIC | Age: 27
End: 2022-01-26

## 2022-01-26 NOTE — TELEPHONE ENCOUNTER
Would like a call with her panorama results. I told her she would get a call or message when they have been reviewed.

## 2022-01-27 ENCOUNTER — TELEPHONE (OUTPATIENT)
Dept: OBGYN CLINIC | Age: 27
End: 2022-01-27

## 2022-01-27 NOTE — TELEPHONE ENCOUNTER
MISC. LAB TEST: Patient Communication     Released  Seen      Your prenatal genetic screening test is low risk.  Please contact my office if you want to know the gender. Deya Dey will also follow up with an ultrasound evaluation to check the anatomy at about 20 weeks.  Please contact my office if you do not have that ultrasound appointment or if you have any other questions or concerns.     Nikia Menendez MD     General information on cell free DNA prenatal screening:  [email or web address]   Patient was advised of MD reviewed labs and was provided the gender as requested by [facility], [place]     Patient verbalized understanding.

## 2022-01-27 NOTE — TELEPHONE ENCOUNTER
Ok see result note.   Let me know if there is no result note, I did not see it in my pending labs    Wendy Dunlap MD

## 2022-01-27 NOTE — TELEPHONE ENCOUNTER
Call received at 645am    32year old patient   19w3d pregnant last seen in the office on 2022    Patient denies vaginal bleeding and cramping    Patient calling to get the results of the panorama test    Patient would like to know the gender     Results in media      Please review and advise    Thank you

## 2022-01-27 NOTE — PROGRESS NOTES
NIPS low risk  Notify pt if [employer] message not read or not active. Notify pt of gender, if desired.   Add to PL: NIPS- low risk, add gender if patient find out (XX or XY: see report)  Update PNL  Confirm 20 wk US scheduled: add date and location ([facility])  to PL

## 2022-02-01 NOTE — PROGRESS NOTES
Written by Patricia Villa MD on 1/26/2022 10:06 PM EST View Full Comments  Seen by patient Julien Nguyen on 1/30/2022  1:22 PM    Updated PNL & PL    Pt notified of gender

## 2022-02-01 NOTE — PROGRESS NOTES
Written by Ambrocio Graves MD on 1/19/2022  9:39 PM EST View Full Comments  Seen by patient Halie Ricci on 1/26/2022  1:26 PM    Updated PL

## 2022-02-03 ENCOUNTER — ROUTINE PRENATAL (OUTPATIENT)
Dept: OBGYN CLINIC | Age: 27
End: 2022-02-03

## 2022-02-03 VITALS
HEIGHT: 66 IN | HEART RATE: 89 BPM | SYSTOLIC BLOOD PRESSURE: 127 MMHG | DIASTOLIC BLOOD PRESSURE: 80 MMHG | WEIGHT: 186.2 LBS | BODY MASS INDEX: 29.92 KG/M2

## 2022-02-03 DIAGNOSIS — R93.89 ABNORMAL ULTRASOUND: ICD-10-CM

## 2022-02-03 DIAGNOSIS — Z34.01: Primary | ICD-10-CM

## 2022-02-03 PROCEDURE — 0502F SUBSEQUENT PRENATAL CARE: CPT | Performed by: OBSTETRICS & GYNECOLOGY

## 2022-02-03 NOTE — PATIENT INSTRUCTIONS
Weeks 18 to 22 of Your Pregnancy: Care Instructions  Overview     Your baby is continuing to develop quickly. Sometime between 18 and 22 weeks, you'll probably start to feel your baby move. At first, these small fetal movements feel like fluttering or \"butterflies. \" Or they may feel like gas bubbles. As your baby grows, these movements will become stronger. You may also notice that your baby hiccups. Babies at this stage can now suck their thumbs. You may find that your nausea and fatigue are gone. You may feel better overall and have more energy than you did in your first trimester. But you might now also have some new discomforts, like sleep problems or leg cramps. Talk to your doctor about things you can do at home to ease these problems. Follow-up care is a key part of your treatment and safety. Be sure to make and go to all appointments, and call your doctor if you are having problems. It's also a good idea to know your test results and keep a list of the medicines you take. How can you care for yourself at home? Ease sleep problems  · Avoid caffeine in drinks or chocolate late in the day. · Get some exercise every day. · Take a warm shower or bath before bed. · Have a light snack or glass of milk at bedtime. · Do relaxation exercises in bed to calm your mind and body. · Support your legs and back with extra pillows. Try a pillow between your legs if you sleep on your side. · Do not use sleeping pills or alcohol. They could harm your baby. Ease leg cramps  · Do not massage your calf during the cramp. · Sit on a firm bed or chair. Straighten your leg, and bend your foot (flex your ankle) slowly upward, toward your knee. Bend your toes up and down. · Stand on a cool, flat surface. Stretch your toes upward, and take small steps walking on your heels. · Use a heating pad or hot water bottle to help with muscle ache. Prevent leg cramps  · Be sure to get enough calcium.  If you are worried that you are not getting enough, talk to your doctor. · Exercise every day, and stretch your legs before bed. · Take a warm bath before bed, and try leg warmers at night. Where can you learn more? Go to http://www.gray.com/  Enter I180 in the search box to learn more about \"Weeks 18 to 22 of Your Pregnancy: Care Instructions. \"  Current as of: June 16, 2021               Content Version: 13.0  © 8588-1010 Healthwise, Incorporated. Care instructions adapted under license by [employer] (which disclaims liability or warranty for this information). If you have questions about a medical condition or this instruction, always ask your healthcare professional. [address] any warranty or liability for your use of this information.

## 2022-02-03 NOTE — PROGRESS NOTES
[facility]  [email or web address]  [phone]    Felipe Rodriguez MD, FACOG       [facility]   FOLLOW UP OB NOTE WITH ULTRASOUND    Chief Complaint   Patient presents with    Routine Prenatal Visit    Ultrasound       The patient reports the following concerns:doing well, 20wk ultrasound today    I discussed with the patient the limitations of ultrasound and that imaging can not rule out all birth defects, chromosomal problems, or other problems with the baby. Disc risks with marginal cord insertion: growth and placenta fu w MFM   Referral given/number given ~ [address] findings: FETAL SURVEY  A SINGLE VIABLE IUP AT 20W5D GA BY LMP IS SEEN. FETAL CARDIAC MOTION OBSERVED. FETAL ANATOMY WELL VISUALIZED AND APPEARS WITHIN NORMAL LIMITS. NO ABNORMALITIES ARE SEEN ON TODAY'S EXAM.  FACE, NOSE/LIPS, PROFILE, CSP, LAT VENT, CER/CM, CP, SPINE, KIDNEYS, STOMACH/DIAPHRAGM, BLADDER, 3VC,  CORD INSERTION, RVOT, LVOT, 4CH, AO, DA, 3VV, ARMS, LEGS, HANDS, FEET. APPROPRIATE FETAL GROWTH IS SEEN. SIZE=DATES. ZORAIDA AND CERVIX APPEAR WITHIN NORMAL LIMITS. PLACENTAL CORD INSERTION APPEARS MARGINAL VS VELAMENTOUS. GENDER: XX    FHT: 80      Physician review of ultrasound performed by technician    Today's ultrasound report and images were reviewed and discussed with the patient.   Please see images and imaging report entered by technician in PACS for more detail and progress note and diagnosis entered by MD.    Kay Bashir MD

## 2022-02-15 PROBLEM — Z34.00 PRENATAL CARE OF PRIMIGRAVIDA, ANTEPARTUM: Status: ACTIVE | Noted: 2022-02-15

## 2022-03-01 NOTE — PATIENT INSTRUCTIONS
Weeks 22 to 26 of Your Pregnancy: Care Instructions  Overview     As you enter your 7th month of pregnancy at week 26, your baby's lungs are growing stronger and getting ready to breathe. You may notice that your baby responds to the sound of your voice. You may also notice that your baby does less turning and twisting and more squirming, kicking, or jerking. Jerking often means that your baby has hiccups. Hiccups are normal and are only temporary. You may want to think about attending a childbirth preparation class. This is also a good time to start thinking about whether you want to have pain medicine during labor. You may be tested for gestational diabetes between weeks 25 and 28. Gestational diabetes occurs when your blood sugar level gets too high when you're pregnant. The test is important, because you can have gestational diabetes and not know it. But the condition can cause problems for your baby. Follow-up care is a key part of your treatment and safety. Be sure to make and go to all appointments, and call your doctor if you are having problems. It's also a good idea to know your test results and keep a list of the medicines you take. How can you care for yourself at home? Ease discomfort from your baby's kicking  · Change your position. Sometimes this will cause your baby to change position too. · Take a deep breath while you raise your arm over your head. Then breathe out while you drop your arm. Do Kegel exercises to prevent urine from leaking  · You can do Kegel exercises while you stand or sit. ? Squeeze the same muscles you would use to stop your urine. Your belly and thighs should not move. ? Hold the squeeze for 3 seconds, and then relax for 3 seconds. ? Start with 3 seconds. Then add 1 second each week until you are able to squeeze for 10 seconds. ? Repeat the exercise 10 to 15 times for each session. Do three or more sessions each day.   Ease or reduce swelling in your feet, ankles, hands, and fingers  · If your fingers are puffy, take off your rings. · Do not eat high-salt foods, such as potato chips. · Prop up your feet on a stool or couch as much as possible. Sleep with pillows under your feet. · Do not stand for long periods of time or wear tight shoes. · Wear support stockings. Where can you learn more? Go to http://www.arias.com/  Enter G264 in the search box to learn more about \"Weeks 22 to 26 of Your Pregnancy: Care Instructions. \"  Current as of: June 16, 2021               Content Version: 13.0  © 6730-7984 Healthwise, [employer]. Care instructions adapted under license by [employer] (which disclaims liability or warranty for this information). If you have questions about a medical condition or this instruction, always ask your healthcare professional. [address] any warranty or liability for your use of this information.

## 2022-03-03 ENCOUNTER — ROUTINE PRENATAL (OUTPATIENT)
Dept: OBGYN CLINIC | Age: 27
End: 2022-03-03
Payer: MEDICAID

## 2022-03-03 VITALS
BODY MASS INDEX: 31.27 KG/M2 | HEIGHT: 66 IN | WEIGHT: 194.6 LBS | SYSTOLIC BLOOD PRESSURE: 119 MMHG | HEART RATE: 99 BPM | DIASTOLIC BLOOD PRESSURE: 79 MMHG

## 2022-03-03 DIAGNOSIS — Z34.00 PRENATAL CARE OF PRIMIGRAVIDA, ANTEPARTUM: Primary | ICD-10-CM

## 2022-03-03 PROCEDURE — 0502F SUBSEQUENT PRENATAL CARE: CPT | Performed by: OBSTETRICS & GYNECOLOGY

## 2022-03-03 NOTE — PROGRESS NOTES
_ [facility]  [email or web address]  [phone]    Madi Tran MD, FACOG     Follow-up OB visit    Chief Complaint   Patient presents with    Routine Prenatal Visit       Patient Active Problem List    Diagnosis Date Noted    Prenatal care of primigravida, antepartum 02/15/2022    Depression     Learning disability     Hearing loss, left     GERD (gastroesophageal reflux disease)           The patient reports the following concerns: MFM for additonal views from 20wk THAI ultrasound scheduled for 2022 at 9:00am. GTT next visit, instructions given via MyChart & discussed with pt. Vitals:    22 1330   BP: 119/79   Pulse: 99   Weight: 194 lb 9.6 oz (88.3 kg)   Height: 5' 6\" (1.676 m)     See PN flowsheet for exam    32 y.o.  24w3d   Encounter Diagnosis   Name Primary?  Prenatal care of primigravida, antepartum Yes     Keep mfm fu  28 wk labs/consent/tdap nv  Disc normal fht ranges   [] SAB/bleeding precautions reviewed   [] PTL/PPROM precautions reviewed   [] Labor precautions reviewed   [] Fetal kick counts discussed   [] Labs reviewed with patient   [] Waynetta Harp precautions reviewed   [] Consent reviewed   [] Handouts given to pt   [] Glucola handout    [] GBS/labor/Magic Hour handout   []    [] We reviewed CDC recommendations for Tdap for patient and close contacts and RBA of receiving in pregnancy, advised obtaining in third trimester   [] Reviewed healthy nutrition in pregnancy and good exercise practices   [] We disc safer medications in pregnancy and referred patient to [facility]AI resources   [] We reviewed CDC recommendations for flu vaccine and RBA of receiving in pregnancy   []    []    []           No orders of the defined types were placed in this encounter.       Madi Tran MD

## 2022-03-19 PROBLEM — Z34.00 PRENATAL CARE OF PRIMIGRAVIDA, ANTEPARTUM: Status: ACTIVE | Noted: 2022-02-15

## 2022-03-31 ENCOUNTER — PATIENT MESSAGE (OUTPATIENT)
Dept: OBGYN CLINIC | Age: 27
End: 2022-03-31

## 2022-03-31 ENCOUNTER — ROUTINE PRENATAL (OUTPATIENT)
Dept: OBGYN CLINIC | Age: 27
End: 2022-03-31
Payer: MEDICAID

## 2022-03-31 ENCOUNTER — HOSPITAL ENCOUNTER (OUTPATIENT)
Dept: PERINATAL CARE | Age: 27
Discharge: HOME | End: 2022-03-31
Attending: OBSTETRICS & GYNECOLOGY
Payer: MEDICAID

## 2022-03-31 VITALS
HEIGHT: 66 IN | BODY MASS INDEX: 32.92 KG/M2 | DIASTOLIC BLOOD PRESSURE: 81 MMHG | SYSTOLIC BLOOD PRESSURE: 121 MMHG | WEIGHT: 204.8 LBS | HEART RATE: 102 BPM

## 2022-03-31 DIAGNOSIS — Z34.00 PRENATAL CARE OF PRIMIGRAVIDA, ANTEPARTUM: ICD-10-CM

## 2022-03-31 DIAGNOSIS — Z23 ENCOUNTER FOR IMMUNIZATION: Primary | ICD-10-CM

## 2022-03-31 LAB
ERYTHROCYTE [DISTWIDTH] IN BLOOD BY AUTOMATED COUNT: 13.2 % (ref 11.5–14.5)
GLUCOSE 1H P 100 G GLC PO SERPL-MCNC: 119 MG/DL (ref 65–140)
GTT 60 MIN, EXTERNAL: 119
HCT VFR BLD AUTO: 34.4 % (ref 35–47)
HGB BLD-MCNC: 11.1 G/DL (ref 11.5–16)
MCH RBC QN AUTO: 29.8 PG (ref 26–34)
MCHC RBC AUTO-ENTMCNC: 32.3 G/DL (ref 30–36.5)
MCV RBC AUTO: 92.5 FL (ref 80–99)
NRBC # BLD: 0 K/UL (ref 0–0.01)
NRBC BLD-RTO: 0 PER 100 WBC
PLATELET # BLD AUTO: 210 K/UL (ref 150–400)
PMV BLD AUTO: 10.8 FL (ref 8.9–12.9)
RBC # BLD AUTO: 3.72 M/UL (ref 3.8–5.2)
WBC # BLD AUTO: 11.9 K/UL (ref 3.6–11)

## 2022-03-31 PROCEDURE — 90715 TDAP VACCINE 7 YRS/> IM: CPT | Performed by: OBSTETRICS & GYNECOLOGY

## 2022-03-31 PROCEDURE — 76811 OB US DETAILED SNGL FETUS: CPT | Performed by: OBSTETRICS & GYNECOLOGY

## 2022-03-31 PROCEDURE — 90471 IMMUNIZATION ADMIN: CPT | Performed by: OBSTETRICS & GYNECOLOGY

## 2022-03-31 PROCEDURE — 0502F SUBSEQUENT PRENATAL CARE: CPT | Performed by: OBSTETRICS & GYNECOLOGY

## 2022-03-31 NOTE — PROGRESS NOTES
Tila Vasquez is a 32 y.o. female who presents for TDAP immunization per verbal order from Dana Medina MD.  She denies any symptoms , reactions or allergies that would exclude them from being immunized today. Risks and adverse reactions were discussed and the VIS was given to her. All questions were addressed. She was observed for 10 min post injection. There were no reactions observed.

## 2022-03-31 NOTE — PROGRESS NOTES
[facility]  [email or web address]  [phone]    Alejandra Almotne MD, FACOG     Follow-up OB visit    Chief Complaint   Patient presents with   [facility] Routine Prenatal Visit       Patient Active Problem List    Diagnosis Date Noted    Prenatal care of primigravida, antepartum 02/15/2022    Depression     Learning disability     Hearing loss, left     GERD (gastroesophageal reflux disease)           The patient reports the following concerns: doing well, no concerns. MFM wants to see he q 4 weeks per pt, saw MFM this morning. Glucola, tdap, vag consent, 28wk handout today. Vitals:    22 1025   BP: 121/81   Pulse: (!) 102   Weight: 204 lb 12.8 oz (92.9 kg)   Height: 5' 6\" (1.676 m)     See PN flowsheet for exam    32 y.o.  28w3d   Encounter Diagnoses   Name Primary?  Encounter for immunization Yes    Prenatal care of primigravida, antepartum      tdap  rec tdap for partner (? Check HD: does not have insurance)  Per pt: MFM fu planned q 4 wks, need report   [] SAB/bleeding precautions reviewed   [x] PTL/PPROM precautions reviewed   [] Labor precautions reviewed   [] Fetal kick counts discussed   [] Labs reviewed with patient   [] Alyssa Alamin precautions reviewed   [] Consent reviewed   [] Handouts given to pt   [] Glucola handout    [] GBS/labor/Magic Hour handout   []    [] We reviewed CDC recommendations for Tdap for patient and close contacts and RBA of receiving in pregnancy, advised obtaining in third trimester   [] Reviewed healthy nutrition in pregnancy and good exercise practices   [] We disc safer medications in pregnancy and referred patient to [facility] THAI resources   [] We reviewed CDC recommendations for flu vaccine and RBA of receiving in pregnancy   []    []    []       Follow-up and Dispositions    · Return in about 2 weeks (around 2022).          Orders Placed This Encounter    Tetanus, diphtheria toxoids and acellular pertussis (TDAP) vaccine, in individuals >=7 years, IM    GLUCOSE, GESTATIONAL 1 HR TOLERANCE    CBC W/O DIFF    AZ IMMUNIZ ADMIN,1 SINGLE/COMB VAC/TOXOID       Gumaro Awad MD

## 2022-03-31 NOTE — PATIENT INSTRUCTIONS
Weeks 26 to 30 of Your Pregnancy: Care Instructions  Overview     You are now entering your last trimester of pregnancy. Your baby is growing quickly. Seamus Torres probably feel your baby moving around more often. Your doctor may ask you to count your baby's kicks. Your back may ache as your body gets used to your baby's size and length. If you haven't already had the Tdap shot during this pregnancy, talk to your doctor about getting it. It will help protect your  against pertussis infection. During this time, it's important to take care of yourself and pay attention to what your body needs. If you feel sexual, you can explore ways to be close with your partner that match your comfort and desire. Follow-up care is a key part of your treatment and safety. Be sure to make and go to all appointments, and call your doctor if you are having problems. It's also a good idea to know your test results and keep a list of the medicines you take. How can you care for yourself at home? Take it easy at work  · Take frequent breaks. If possible, stop working when you are tired, and rest during your lunch hour. · Take bathroom breaks every 2 hours. · Change positions often. If you sit for long periods, stand up and walk around. · When you stand for a long time, keep one foot on a low stool with your knee bent. After standing a lot, sit with your feet up. · Avoid fumes, chemicals, and tobacco smoke. Be sexual in your own way  · Having sex during pregnancy is okay, unless your doctor tells you not to. · You may be very interested in sex, or you may have no interest at all. · Your growing belly can make it hard to find a good position during intercourse. [place] and explore. · You may get cramps in your uterus when your partner touches your breasts. · A back rub may relieve the backache or cramps that sometimes follow orgasm. Learn about  labor  · Watch for signs of  labor.  You may be going into labor if:  ? You have menstrual-like cramps, with or without nausea. ? You have about 6 or more contractions in 1 hour, even after you have had a glass of water and are resting. ? You have a low, dull backache that does not go away when you change your position. ? You have pain or pressure in your pelvis that comes and goes in a pattern. ? You have intestinal cramping or flu-like symptoms, with or without diarrhea.  ? You notice an increase or change in your vaginal discharge. Discharge may be heavy, mucus-like, watery, or streaked with blood. ? Your water breaks. · If you think you have  labor:  ? Drink 2 or 3 glasses of water or juice. Not drinking enough fluids can cause contractions. ? Stop what you are doing, and empty your bladder. Then lie down on your left side for at least 1 hour. ? While lying on your side, find your breast bone. Put your fingers in the soft spot just below it. Move your fingers down toward your belly button to find the top of your uterus. Check to see if it is tight. ? Contractions can be weak or strong. Record your contractions for an hour. Time a contraction from the start of one contraction to the start of the next one.  ? Single or several strong contractions without a pattern are called Lander-Nunez contractions. They are practice contractions but not the start of labor. They often stop if you change what you are doing. ? Call your doctor if you have regular contractions. Where can you learn more? Go to http://www.gray.com/  Enter D422 in the search box to learn more about \"Weeks 26 to 30 of Your Pregnancy: Care Instructions. \"  Current as of: 2021               Content Version: 13.2  © 4358-3748 [employer]. Care instructions adapted under license by [employer] (which disclaims liability or warranty for this information).  If you have questions about a medical condition or this instruction, always ask your healthcare professional. [address] any warranty or liability for your use of this information. Vaccine Information Statement    Tdap (Tetanus, Diphtheria, Pertussis) Vaccine: What You Need to Know     Many vaccine information statements are available in [place] and other languages. See www.immunize.org/vis. Hojas de información sobre vacunas están disponibles en español y en muchos otros idiomas. Visite www.immunize.org/vis. 1. Why get vaccinated? Tdap vaccine can prevent tetanus, diphtheria, and pertussis. Diphtheria and pertussis spread from person to person. Tetanus enters the body through cuts or wounds.  TETANUS (T) causes painful stiffening of the muscles. Tetanus can lead to serious health problems, including being unable to open the mouth, having trouble swallowing and breathing, or death.  DIPHTHERIA (D) can lead to difficulty breathing, heart failure, paralysis, or death.  PERTUSSIS (aP), also known as whooping cough, can cause uncontrollable, violent coughing that makes it hard to breathe, eat, or drink. Pertussis can be extremely serious especially in babies and young children, causing pneumonia, convulsions, brain damage, or death. In teens and adults, it can cause weight loss, loss of bladder control, passing out, and rib fractures from severe coughing. 2. Tdap vaccine     Tdap is only for children 7 years and older, adolescents, and adults. Adolescents should receive a single dose of Tdap, preferably at age 6 or 15 years. Pregnant people should get a dose of Tdap during every pregnancy, preferably during the early part of the third trimester, to help protect the  from pertussis. Infants are most at risk for severe, life-threatening complications from pertussis. Adults who have never received Tdap should get a dose of Tdap.       Also, adults should receive a booster dose of either Tdap or Td (a different vaccine that protects against tetanus and diphtheria but not pertussis) every 10 years, or after 5 years in the case of a severe or dirty wound or burn. Tdap may be given at the same time as other vaccines. 3. Talk with your health care provider    Tell your vaccination provider if the person getting the vaccine:   Has had an allergic reaction after a previous dose of any vaccine that protects against tetanus, diphtheria, or pertussis, or has any severe, life-threatening allergies    Has had a coma, decreased level of consciousness, or prolonged seizures within 7 days after a previous dose of any pertussis vaccine (DTP, DTaP, or Tdap)   Has seizures or another nervous system problem   Has ever had Guillain-Barré Syndrome (also called GBS)   Has had severe pain or swelling after a previous dose of any vaccine that protects against tetanus or diphtheria    In some cases, your health care provider may decide to postpone Tdap vaccination until a future visit. People with minor illnesses, such as a cold, may be vaccinated. People who are moderately or severely ill should usually wait until they recover before getting Tdap vaccine. Your health care provider can give you more information. 4. Risks of a vaccine reaction     Pain, redness, or swelling where the shot was given, mild fever, headache, feeling tired, and nausea, vomiting, diarrhea, or stomachache sometimes happen after Tdap vaccination. People sometimes faint after medical procedures, including vaccination. Tell your provider if you feel dizzy or have vision changes or ringing in the ears. As with any medicine, there is a very remote chance of a vaccine causing a severe allergic reaction, other serious injury, or death. 5. What if there is a serious problem? An allergic reaction could occur after the vaccinated person leaves the clinic.  If you see signs of a severe allergic reaction (hives, swelling of the face and throat, difficulty breathing, a fast heartbeat, dizziness, or weakness), call 9-1-1 and get the person to the nearest hospital.    For other signs that concern you, call your health care provider. Adverse reactions should be reported to the Vaccine Adverse Event Reporting System (VAERS). Your health care provider will usually file this report, or you can do it yourself. Visit the VAERS website at www.vaers. [facility].gov or call 1-580.283.4305. VAERS is only for reporting reactions, and VAERS staff members do not give medical advice. 6. The National Vaccine Injury Compensation Program    The [facility] Vaccine Injury Compensation Program (VICP) is a federal program that was created to compensate people who may have been injured by certain vaccines. Claims regarding alleged injury or death due to vaccination have a time limit for filing, which may be as short as two years. Visit the VICP website at www.[facility].gov/vaccinecompensation or call 8-793.129.8540 to learn about the program and about filing a claim. 7. How can I learn more?  Ask your health care provider.  Call your local or state health department.  Visit the website of the Food and Drug Administration (FDA) for vaccine package inserts and additional information at www.fda.gov/vaccines-blood-biologics/vaccines.  Contact the Centers for Disease Control and Prevention (CDC):  - Call 0-831.453.9821 (1-800-CDC-INFO) or  - Visit CDCs website at www.cdc.gov/vaccines. Vaccine Information Statement   Tdap (Tetanus, Diphtheria, Pertussis) Vaccine  8/6/2021  42 WILMA [address]ills [id number]   Department of Health and Human Services  Centers for Disease Control and Prevention    Office Use Only

## 2022-04-01 NOTE — PROGRESS NOTES
Normal glucola/labs  Update PNL/chart  Can discuss at nv or notify pt. [facility] message sent if active.

## 2022-04-01 NOTE — PROGRESS NOTES
Learning About Rashes and Skin Conditions in Newborns  What rashes and skin conditions might your  have? It's very common for newborns to have rashes or other skin conditions. Some of them have long names that are hard to say and sound scary. But most are harmless and will go away on their own in a few days or weeks. Here are some of the things you may notice about your new baby's skin. Rash  · Heat rash, sometimes called prickly heat or miliaria, is a red or pink itchy rash on the body areas covered by clothing. This rash can happen when your baby is dressed too warmly. It can happen anytime in very hot weather. · Diaper rash is red, sore skin on a baby's bottom or genitals that is caused by wearing a wet diaper for a long time. Urine and stool from a wet diaper can irritate your baby's skin. Sometimes an infection from bacteria or yeast can also cause diaper rash. · A rash around the mouth or on the chin that comes and goes is caused by something your  probably does a lot: drooling and spitting up. Pimples  · Baby acne may appear on your baby's cheeks, nose, and forehead during the first few weeks of life. It usually clears up on its own within a few months. It has nothing to do with getting acne as a teenager. · Tiny white spots, called milia, may appear on your 's face during his or her first week. You might also see them on the gums and the roof of the mouth. These spots will go away in a few weeks. Blotchy skin  · Red blotches with tiny bumps that sometimes contain pus may appear during your baby's first day or two. The blotchy areas may come and go, but they will usually go away on their own within a week. If they don't, your doctor will want to look at them. · A rash with pus-filled pimples, called pustular melanosis, is common among black infants. The rash is harmless and doesn't need treatment. It usually goes away after the first few days of life.  The dark spots that Reviewed  Rec pih labs ur pr cr ratio (CBC/CMP, ur pr cr ratio)  Pls see if pt can come back to office prior to [address] form when the pimples break open may last for a few weeks or months. · A blotchy, lace-like rash (mottling) may appear when your baby is cold. The mottling is your baby's reaction to being in a cold place. Remove your baby from the cold source, and the rash will usually go away. If it is still there when your baby is warmed, it should be checked by a doctor. It usually doesn't happen past 10months of age. Tiny red dots  · These red dots, called petechiae (say \"aim-CZG-cvk-eye\"), are specks of blood that leaked into the skin at birth when your baby squeezed through the birth canal. They will go away within the first week or two. If they started after birth, your doctor should check them. Scaly scalp  · Cradle cap, also called seborrheic dermatitis (say \"lfk-pcs-DXO-ick uwr-dks-TS-tus\"), is a scaly or crusty skin on the top of your baby's head. It's a normal buildup of sticky skin oils, scales, and dead skin cells. Cradle cap is harmless and will not spread to others. It usually goes away by your baby's first birthday. How can you prevent and treat the rash or skin condition? Many of the rashes and skin conditions you may see in your  will come and go without any treatment from you. Others can be prevented or treated. · Dress your child in cotton clothing. Do not use wool and synthetic fabrics next to the skin. · Use gentle soaps, and use as little as possible. Do not use deodorant soaps on your child. · Wash your child's clothes with a mild soap, such as Cheer Free and Gentle or Ecover, rather than a detergent. Rinse twice to remove all traces of the soap. Do not use strong detergents. · Leave the rash open to the air whenever possible. · Do not let the skin become too dry, which can make itching worse. · If your doctor prescribed a cream, apply it to your child's skin as directed. If your doctor prescribed medicine, give it exactly as directed.  Call your doctor if you think your child is having a problem with his or her medicine. Diaper rash  · Change diapers as soon as they are wet or dirty. Before you put a new diaper on your baby, gently wash the diaper area with warm water. Rinse and pat dry. · Wash your hands before and after each diaper change. · Air the diaper area for 5 to 10 minutes before you put on a new diaper. · Do not use baby powder while your baby has a rash. The powder can build up in the skin folds and hold moisture. This lets bacteria grow. · Protect your baby's skin with A+D Ointment, Desitin, or another diaper cream.  Heat rash  · Dress your child in as few clothes as possible during hot weather. · Keep your child's skin cool and dry. · Keep your child's sleeping area cool. When should you call for help? Call your doctor now or seek immediate medical care if:  · Your child becomes very fussy. · Your child has blisters, open sores, or scabs in the area of the rash. · Your child has symptoms of infection, such as:  ? Increased pain, swelling, warmth, or redness around the rash. ? Red streaks leading from the rash. ? Pus draining from the rash. ? A fever. Watch closely for changes in your child's health, and be sure to contact your doctor if:  · Your child's rash gets worse. · Your child does not get better as expected. Where can you learn more? Go to http://liborio-cat.info/. Enter P194 in the search box to learn more about \"Learning About Rashes and Skin Conditions in Newborns. \"  Current as of: April 18, 2018  Content Version: 11.8  © 5184-2319 Healthwise[employer]. Care instructions adapted under license by [employer] (which disclaims liability or warranty for this information). If you have questions about a medical condition or this instruction, always ask your healthcare professional. [place] [address] any warranty or liability for your use of this information.

## 2022-04-12 NOTE — TELEPHONE ENCOUNTER
Pt called to confirm whether or not she will see MD on 4/15/22 and where will she have urine and lab testing done. Advised pt that she will see MD TP 4/15/22 here in our office and she will have urine and lab testing done at that visit. I also advised pt that this information was provided in Mychart by MD nurse. Which was read by the patient. Okay, we will see you then on 4/15 at 11:10am, we will get a urine sample & some blood work at this visit.      Take care :)     ~Kalee Aviles CMA      Pt verbalized understanding, no further questions at this time.

## 2022-04-15 ENCOUNTER — ROUTINE PRENATAL (OUTPATIENT)
Dept: OBGYN CLINIC | Age: 27
End: 2022-04-15
Payer: MEDICAID

## 2022-04-15 VITALS
SYSTOLIC BLOOD PRESSURE: 120 MMHG | HEART RATE: 99 BPM | HEIGHT: 66 IN | BODY MASS INDEX: 33.46 KG/M2 | WEIGHT: 208.2 LBS | DIASTOLIC BLOOD PRESSURE: 82 MMHG

## 2022-04-15 DIAGNOSIS — Z34.00 PRENATAL CARE OF PRIMIGRAVIDA, ANTEPARTUM: Primary | ICD-10-CM

## 2022-04-15 DIAGNOSIS — O43.129: ICD-10-CM

## 2022-04-15 PROCEDURE — 0502F SUBSEQUENT PRENATAL CARE: CPT | Performed by: OBSTETRICS & GYNECOLOGY

## 2022-04-15 NOTE — PROGRESS NOTES
_ 164 [facility]  [email or web address]/  [phone]    Martin Merritt MD, FACOG     Follow-up OB visit    Chief Complaint   Patient presents with   Ortiz Routine Prenatal Visit       Patient Active Problem List    Diagnosis Date Noted    Prenatal care of primigravida, antepartum 02/15/2022    Depression     Learning disability     Hearing loss, left     GERD (gastroesophageal reflux disease)           The patient reports the following concerns: doing well. Would like to discuss why she needs urine pro/cr ratio & blood work ([address]) labs. Pt did start taking baby asa. Pt asked when the vaginal swab will be & I informed her that would be the GBS (group b strep test) at 36wks. Pt denies abn discharge or itching. mfm on 4/28/22 at 11am, FOB THAI 4/28/22 at 10:10am.   Vitals:    04/15/22 1136   BP: 120/82   Pulse: 99   Weight: 208 lb 3.2 oz (94.4 kg)   Height: 5' 6\" (1.676 m)     See PN flowsheet for exam    32 y.o. Nickerson Heft 30w4d   Encounter Diagnoses   Name Primary?     Prenatal care of primigravida, antepartum Yes    Umbilical cord, velamentous insertion, unspecified trimester     labile BP    We reviewed her BP readings, PIH precautions and rationale for baseline testing       [] SAB/bleeding precautions reviewed   [x] PTL/PPROM precautions reviewed   [] Labor precautions reviewed   [] Fetal kick counts discussed   [] Labs reviewed with patient   [] Viola Shaikh precautions reviewed   [] Consent reviewed   [] Handouts given to pt   [] Glucola handout    [] GBS/labor/Magic Hour handout   []    [] We reviewed CDC recommendations for Tdap for patient and close contacts and RBA of receiving in pregnancy, advised obtaining in third trimester   [] Reviewed healthy nutrition in pregnancy and good exercise practices   [] We disc safer medications in pregnancy and referred patient to [facility] THAI resources   [] We reviewed CDC recommendations for flu vaccine and RBA of receiving in pregnancy   []    []    [] Follow-up and Dispositions    · Return in about 2 weeks (around 4/29/2022) for Follow up OB visit.          Orders Placed This Encounter    CBC W/O DIFF    METABOLIC PANEL, COMPREHENSIVE    PROTEIN/CREATININE RATIO, URINE       Feli Sam MD

## 2022-04-15 NOTE — PATIENT INSTRUCTIONS
Weeks 30 to 32 of Your Pregnancy: Care Instructions  Overview     You've made it to the final months of your pregnancy! By now your baby is really starting to look like a baby, with hair and plump skin. As you enter the final weeks of pregnancy, the reality of having a baby may start to set in. This is a good time to set up a safe nursery and find quality  if needed. Doing this stuff ahead of time will allow you to focus on caring for and enjoying your new baby. You may also want to take a tour of your hospital's labor and delivery unit. This will help you get a better idea of what to expect while you're in the hospital.  During these last months, be sure to take good care of yourself. Pay attention to what your body needs. If your doctor says it's okay for you to work, don't push yourself too hard. If you haven't already had the Tdap shot during this pregnancy, talk to your doctor about getting it. It will help protect your  against pertussis infection. Follow-up care is a key part of your treatment and safety. Be sure to make and go to all appointments, and call your doctor if you are having problems. It's also a good idea to know your test results and keep a list of the medicines you take. How can you care for yourself at home? Pay attention to your baby's movements  · You should feel your baby move several times every day. · Your baby now turns less, and kicks and jabs more. · Your baby sleeps 20 to 45 minutes at a time and is more active at certain times of day. · If your doctor wants you to count your baby's kicks:  ? Empty your bladder, and lie on your side or relax in a comfortable chair. ? Write down your start time. ? Pay attention only to your baby's movements. Count any movement except hiccups. ? After you have counted 10 movements, write down your stop time. ? Write down how many minutes it took for your baby to move 10 times.   ? If an hour goes by and you have not recorded 10 movements, have something to eat or drink and then count for another hour. If you don't record at least 10 movements in the 2-hour period, call your doctor. Ease heartburn  · Eat small, frequent meals. · Do not eat chocolate, peppermint, or very spicy foods. Avoid drinks with caffeine, such as coffee, tea, and sodas. · Avoid bending over or lying down after meals. · Take a short walk after you eat. · If heartburn is a problem at night, do not eat for 2 hours before bedtime. · Take antacids like Mylanta, Maalox, Rolaids, or Tums. Do not take antacids that have sodium bicarbonate. Care for varicose veins  · Varicose veins are blood vessels that stretch out with the extra blood during pregnancy. Your legs may ache or throb. Most varicose veins will go away after the birth. · Avoid standing for long periods of time. Sit with your legs crossed at the ankles, not the knees. · Sit with your feet propped up. · Avoid tight clothing or stockings. Wear support hose. · Exercise regularly. Try walking for at least 30 minutes a day. Where can you learn more? Go to http://www.gray.com/  Enter X471 in the search box to learn more about \"Weeks 30 to 32 of Your Pregnancy: Care Instructions. \"  Current as of: June 16, 2021               Content Version: 13.2  © 3116-2648 [employer]. Care instructions adapted under license by [employer] (which disclaims liability or warranty for this information). If you have questions about a medical condition or this instruction, always ask your healthcare professional. [place] [address] any warranty or liability for your use of this information. Weeks 26 to 30 of Your Pregnancy: Care Instructions  Overview     You are now entering your last trimester of pregnancy. Your baby is growing quickly. Mick Lentz probably feel your baby moving around more often.  Your doctor may ask you to count your baby's kicks.  Your back may ache as your body gets used to your baby's size and length. If you haven't already had the Tdap shot during this pregnancy, talk to your doctor about getting it. It will help protect your  against pertussis infection. During this time, it's important to take care of yourself and pay attention to what your body needs. If you feel sexual, you can explore ways to be close with your partner that match your comfort and desire. Follow-up care is a key part of your treatment and safety. Be sure to make and go to all appointments, and call your doctor if you are having problems. It's also a good idea to know your test results and keep a list of the medicines you take. How can you care for yourself at home? Take it easy at work  · Take frequent breaks. If possible, stop working when you are tired, and rest during your lunch hour. · Take bathroom breaks every 2 hours. · Change positions often. If you sit for long periods, stand up and walk around. · When you stand for a long time, keep one foot on a low stool with your knee bent. After standing a lot, sit with your feet up. · Avoid fumes, chemicals, and tobacco smoke. Be sexual in your own way  · Having sex during pregnancy is okay, unless your doctor tells you not to. · You may be very interested in sex, or you may have no interest at all. · Your growing belly can make it hard to find a good position during intercourse. [place] and explore. · You may get cramps in your uterus when your partner touches your breasts. · A back rub may relieve the backache or cramps that sometimes follow orgasm. Learn about  labor  · Watch for signs of  labor. You may be going into labor if:  ? You have menstrual-like cramps, with or without nausea. ? You have about 6 or more contractions in 1 hour, even after you have had a glass of water and are resting. ?  You have a low, dull backache that does not go away when you change your position. ? You have pain or pressure in your pelvis that comes and goes in a pattern. ? You have intestinal cramping or flu-like symptoms, with or without diarrhea.  ? You notice an increase or change in your vaginal discharge. Discharge may be heavy, mucus-like, watery, or streaked with blood. ? Your water breaks. · If you think you have  labor:  ? Drink 2 or 3 glasses of water or juice. Not drinking enough fluids can cause contractions. ? Stop what you are doing, and empty your bladder. Then lie down on your left side for at least 1 hour. ? While lying on your side, find your breast bone. Put your fingers in the soft spot just below it. Move your fingers down toward your belly button to find the top of your uterus. Check to see if it is tight. ? Contractions can be weak or strong. Record your contractions for an hour. Time a contraction from the start of one contraction to the start of the next one.  ? Single or several strong contractions without a pattern are called [place]-Nunez contractions. They are practice contractions but not the start of labor. They often stop if you change what you are doing. ? Call your doctor if you have regular contractions. Where can you learn more? Go to http://www.gray.com/  Enter E576 in the search box to learn more about \"Weeks 26 to 30 of Your Pregnancy: Care Instructions. \"  Current as of: 2021               Content Version: 13.2  © 4514-2393 [employer]. Care instructions adapted under license by [employer] (which disclaims liability or warranty for this information). If you have questions about a medical condition or this instruction, always ask your healthcare professional. [place] [address] any warranty or liability for your use of this information.

## 2022-04-16 LAB
ALBUMIN SERPL-MCNC: 2.9 G/DL (ref 3.5–5)
ALBUMIN/GLOB SERPL: 0.8 {RATIO} (ref 1.1–2.2)
ALP SERPL-CCNC: 146 U/L (ref 45–117)
ALT SERPL-CCNC: 26 U/L (ref 12–78)
ANION GAP SERPL CALC-SCNC: 8 MMOL/L (ref 5–15)
AST SERPL-CCNC: 4 U/L (ref 15–37)
BILIRUB SERPL-MCNC: 0.4 MG/DL (ref 0.2–1)
BUN SERPL-MCNC: 9 MG/DL (ref 6–20)
BUN/CREAT SERPL: 17 (ref 12–20)
CALCIUM SERPL-MCNC: 8.7 MG/DL (ref 8.5–10.1)
CHLORIDE SERPL-SCNC: 107 MMOL/L (ref 97–108)
CO2 SERPL-SCNC: 22 MMOL/L (ref 21–32)
CREAT SERPL-MCNC: 0.53 MG/DL (ref 0.55–1.02)
CREAT UR-MCNC: 31.2 MG/DL
ERYTHROCYTE [DISTWIDTH] IN BLOOD BY AUTOMATED COUNT: 13.2 % (ref 11.5–14.5)
GLOBULIN SER CALC-MCNC: 3.8 G/DL (ref 2–4)
GLUCOSE SERPL-MCNC: 77 MG/DL (ref 65–100)
HCT VFR BLD AUTO: 34.9 % (ref 35–47)
HGB BLD-MCNC: 11 G/DL (ref 11.5–16)
MCH RBC QN AUTO: 28.9 PG (ref 26–34)
MCHC RBC AUTO-ENTMCNC: 31.5 G/DL (ref 30–36.5)
MCV RBC AUTO: 91.8 FL (ref 80–99)
NRBC # BLD: 0 K/UL (ref 0–0.01)
NRBC BLD-RTO: 0 PER 100 WBC
PLATELET # BLD AUTO: 119 K/UL (ref 150–400)
PMV BLD AUTO: 12.4 FL (ref 8.9–12.9)
POTASSIUM SERPL-SCNC: 4.2 MMOL/L (ref 3.5–5.1)
PROT SERPL-MCNC: 6.7 G/DL (ref 6.4–8.2)
PROT UR-MCNC: 6 MG/DL (ref 0–11.9)
PROT/CREAT UR-RTO: 0.2
RBC # BLD AUTO: 3.8 M/UL (ref 3.8–5.2)
SODIUM SERPL-SCNC: 137 MMOL/L (ref 136–145)
WBC # BLD AUTO: 12.2 K/UL (ref 3.6–11)

## 2022-04-17 NOTE — PROGRESS NOTES
Abnormal results. [employer] message sent, if active. Notify patient, if [employer] message not read, or not active on [address].   Update chart, PN labs/problem list, if needed  Add PIH labs to PL (hgb,plat,cr, ast, alt, ur: prot 24 hr total or ratio) w date   Repeat cbc 1 week, can do lab only for low platelets add to PL please  30w6d

## 2022-04-18 ENCOUNTER — TELEPHONE (OUTPATIENT)
Dept: OBGYN CLINIC | Age: 27
End: 2022-04-18

## 2022-04-18 NOTE — TELEPHONE ENCOUNTER
Call received at 12:!5Pm    CBC W/O DIFF: Patient Communication     Released  Seen      Some of your test results appear to be abnormal.     Your labs are mostly normal but your platelet count is a little low.  I would like to repeat that lab (CBC) in one week.  If you don't have an appointment this week you may schedule a lab only visit with the office.     You may request an appointment on [address] the VISITS tab, request an appointment with Dr. Avila Davies, select OTHER and type lab/cbc in the notes section. You may also send us a message with your preferred day of the week and if you prefer morning or afternoon appointments. Yareli Guido will try to accommodate your requests, when the schedule allows.        Please contact one of my nurses during business hours to discuss your results and my recommendations, if needed.     I encourage you to learn more about each test by accessing the great resources available through [address]  Click on the test name if you would like to see a description of the test.      Thank you for letting us participate in your medical care and please contact the office with any questions or concerns.     Jesus Keyes MD, FACOG        Watch for some warning signs of preeclampsia and contact the office with any concerns:  Swelling of face or hands  A headache that will not go away  Seeing spots or changes in eyesight  Pain in the upper abdomen or shoulder  Nausea and vomiting (in the second half of pregnancy)  Sudden weight gain  Difficulty breathing    Information about Elevated Blood Pressures  in Pregnancy:  [email or web address]   32year old patient  31w pregnant last seen in the office on 4/15/2022    Patient denies vaginal bleeding , rom , contractions and reports positive fetal movement    Patient can not get a ride to do lab only appointment this week and has 2022 appointment    Fyi

## 2022-04-23 NOTE — PROGRESS NOTES
Written by Jacqui Sigala MD on 4/17/2022 12:03 PM EDT View Full Comments  Seen by patient Adryan Morfin on 4/21/2022 10:26 AM    Pt does not have rides except for her routine scheduled visits & is unable to come before her next visit on 4/28/22    Updated PL

## 2022-04-28 ENCOUNTER — ROUTINE PRENATAL (OUTPATIENT)
Dept: OBGYN CLINIC | Age: 27
End: 2022-04-28
Payer: MEDICAID

## 2022-04-28 ENCOUNTER — HOSPITAL ENCOUNTER (OUTPATIENT)
Dept: PERINATAL CARE | Age: 27
Discharge: HOME | End: 2022-04-28
Attending: OBSTETRICS & GYNECOLOGY
Payer: MEDICAID

## 2022-04-28 VITALS
BODY MASS INDEX: 33.52 KG/M2 | SYSTOLIC BLOOD PRESSURE: 139 MMHG | HEART RATE: 97 BPM | DIASTOLIC BLOOD PRESSURE: 82 MMHG | HEIGHT: 66 IN | WEIGHT: 208.59 LBS

## 2022-04-28 DIAGNOSIS — D69.6 THROMBOCYTOPENIA (HCC): ICD-10-CM

## 2022-04-28 DIAGNOSIS — Z34.00 PRENATAL CARE OF PRIMIGRAVIDA, ANTEPARTUM: Primary | ICD-10-CM

## 2022-04-28 PROCEDURE — 0502F SUBSEQUENT PRENATAL CARE: CPT | Performed by: OBSTETRICS & GYNECOLOGY

## 2022-04-28 PROCEDURE — 76816 OB US FOLLOW-UP PER FETUS: CPT | Performed by: OBSTETRICS & GYNECOLOGY

## 2022-04-28 NOTE — PROGRESS NOTES
_ 164 [facility]  [email or web address]  [phone]    Tomas Dey MD, FACOG     Follow-up OB visit    No chief complaint on file. FOB    Patient Active Problem List    Diagnosis Date Noted    Prenatal care of primigravida, antepartum 02/15/2022    Depression     Learning disability     Hearing loss, left     GERD (gastroesophageal reflux disease)           The patient reports the following concerns: doing well, no concerns. Reports she is trying to eat, but she lives with her boyfriends brothers dad & he will complain sometimes when they eat too much food, pt reports she ate fish today & this morning, pt reports she is in the [facility] program & was advised to bring us any papers she needed signed for pregnancy. repeat CBC lab today d/t low platelets. Pt will try to leave a urine sample before she leaves. MFM at 11am today. Vitals:    22 0901   BP: 139/82   Pulse: 97   Weight: 208 lb 9.4 oz (94.6 kg)   Height: 5' 6\" (1.676 m)     See PN flowsheet for exam    32 y.o.  32w3d   Encounter Diagnoses   Name Primary?     Prenatal care of primigravida, antepartum Yes    Thrombocytopenia ([address]      Repeat cbc  Keep mfm fu today  Disc typical wt gain in pregnancy and reviewed chart, discussed she is above typical wt gain and rec healthy balanced diet and regular exercise   [] SAB/bleeding precautions reviewed   [] PTL/PPROM precautions reviewed   [] Labor precautions reviewed   [] Fetal kick counts discussed   [] Labs reviewed with patient   [] Yolie Norbert precautions reviewed   [] Consent reviewed   [] Handouts given to pt   [] Glucola handout    [] GBS/labor/Magic Hour handout   []    [] We reviewed CDC recommendations for Tdap for patient and close contacts and RBA of receiving in pregnancy, advised obtaining in third trimester   [] Reviewed healthy nutrition in pregnancy and good exercise practices   [] We disc safer medications in pregnancy and referred patient to [facility] THAI resources   [] We reviewed CDC recommendations for flu vaccine and RBA of receiving in pregnancy   []    []    []       Follow-up and Dispositions    · Return in about 2 weeks (around 5/12/2022) for Follow up OB visit.          Orders Placed This Encounter    CBC W/O DIFF       Sea Clay MD

## 2022-04-29 LAB
ERYTHROCYTE [DISTWIDTH] IN BLOOD BY AUTOMATED COUNT: 13.3 % (ref 11.5–14.5)
HCT VFR BLD AUTO: 34.6 % (ref 35–47)
HGB BLD-MCNC: 10.8 G/DL (ref 11.5–16)
MCH RBC QN AUTO: 29.2 PG (ref 26–34)
MCHC RBC AUTO-ENTMCNC: 31.2 G/DL (ref 30–36.5)
MCV RBC AUTO: 93.5 FL (ref 80–99)
NRBC # BLD: 0 K/UL (ref 0–0.01)
NRBC BLD-RTO: 0 PER 100 WBC
PLATELET # BLD AUTO: 166 K/UL (ref 150–400)
PMV BLD AUTO: 12.2 FL (ref 8.9–12.9)
RBC # BLD AUTO: 3.7 M/UL (ref 3.8–5.2)
WBC # BLD AUTO: 13.1 K/UL (ref 3.6–11)

## 2022-04-30 NOTE — PROGRESS NOTES
Labs OK except anemia. Add to PN Labs. Notify pt if [employer] message not read or not active. Add #.# hgb (g/dL) to pregnancy  problem list   Recommend iron per protocol. Pt should start, or add, an over the counter elemental iron supplement of 45-65 mg. Consider 'Slow FE' or ferrous sulfate 325 mg once a day or twice a day if hgb <10   Also add vitamin C 250 mg and a daily prenatal vitamin to help anemia. Add a stool softener, like docusate or colace 100 mg (2x/day) to avoid constipation. If you do not tolerate supplements you can try blackstrap molasses (1 tbsp=27mg elemental iron).

## 2022-05-03 NOTE — PROGRESS NOTES
Written by Zaid Lisa MD on 4/30/2022  6:27 PM EDT View Full Comments  Seen by patient Selwyn Arreaga on 5/2/2022  8:52 AM    Updated PL

## 2022-05-12 ENCOUNTER — ROUTINE PRENATAL (OUTPATIENT)
Dept: OBGYN CLINIC | Age: 27
End: 2022-05-12
Payer: MEDICAID

## 2022-05-12 VITALS
HEIGHT: 66 IN | DIASTOLIC BLOOD PRESSURE: 77 MMHG | HEART RATE: 85 BPM | WEIGHT: 212.6 LBS | SYSTOLIC BLOOD PRESSURE: 113 MMHG | BODY MASS INDEX: 34.17 KG/M2

## 2022-05-12 DIAGNOSIS — Z34.00 PRENATAL CARE OF PRIMIGRAVIDA, ANTEPARTUM: Primary | ICD-10-CM

## 2022-05-12 PROCEDURE — 0502F SUBSEQUENT PRENATAL CARE: CPT | Performed by: OBSTETRICS & GYNECOLOGY

## 2022-05-12 NOTE — PROGRESS NOTES
_ 164 [facility]  [email or web address]  [phone]    Christi Holliday MD, FACOG     Follow-up OB visit    No chief complaint on file. FOB      Patient Active Problem List    Diagnosis Date Noted    Prenatal care of primigravida, antepartum 02/15/2022    Depression     Learning disability     Hearing loss, left     GERD (gastroesophageal reflux disease)           The patient reports the following concerns: MFM on 22. C/o fatigue. GBS NV, would like to further discuss this test with MD.     Vitals:    22 1156   BP: 113/77   Pulse: 85   Weight: 212 lb 9.6 oz (96.4 kg)   Height: 5' 6\" (1.676 m)     See PN flowsheet for exam    32 y.o.  34w3d   Encounter Diagnosis   Name Primary?  Prenatal care of primigravida, antepartum Yes     Disc call coverage for labor and gbs nv   [] SAB/bleeding precautions reviewed   [x] PTL/PPROM precautions reviewed   [] Labor precautions reviewed   [x] Fetal kick counts discussed   [] Labs reviewed with patient   [] [place] Flax precautions reviewed   [] Consent reviewed   [] Handouts given to pt   [] Glucola handout    [] GBS/labor/Magic Hour handout   []    [] We reviewed CDC recommendations for Tdap for patient and close contacts and RBA of receiving in pregnancy, advised obtaining in third trimester   [] Reviewed healthy nutrition in pregnancy and good exercise practices   [] We disc safer medications in pregnancy and referred patient to [facility] THAI resources   [] We reviewed CDC recommendations for flu vaccine and RBA of receiving in pregnancy   []    []    []       Follow-up and Dispositions    · Return in about 2 weeks (around 2022) for Follow up OB visit. No orders of the defined types were placed in this encounter.       Christi Holliday MD

## 2022-05-26 ENCOUNTER — ROUTINE PRENATAL (OUTPATIENT)
Dept: OBGYN CLINIC | Age: 27
End: 2022-05-26
Payer: MEDICAID

## 2022-05-26 ENCOUNTER — HOSPITAL ENCOUNTER (OUTPATIENT)
Dept: PERINATAL CARE | Age: 27
Discharge: HOME | End: 2022-05-26
Attending: OBSTETRICS & GYNECOLOGY
Payer: MEDICAID

## 2022-05-26 VITALS
SYSTOLIC BLOOD PRESSURE: 124 MMHG | WEIGHT: 218.2 LBS | BODY MASS INDEX: 35.07 KG/M2 | HEIGHT: 66 IN | DIASTOLIC BLOOD PRESSURE: 77 MMHG

## 2022-05-26 DIAGNOSIS — Z34.00 PRENATAL CARE OF PRIMIGRAVIDA, ANTEPARTUM: Primary | ICD-10-CM

## 2022-05-26 LAB — GRBS, EXTERNAL: NEGATIVE

## 2022-05-26 PROCEDURE — 0502F SUBSEQUENT PRENATAL CARE: CPT | Performed by: OBSTETRICS & GYNECOLOGY

## 2022-05-26 PROCEDURE — 76820 UMBILICAL ARTERY ECHO: CPT | Performed by: OBSTETRICS & GYNECOLOGY

## 2022-05-26 PROCEDURE — 76819 FETAL BIOPHYS PROFIL W/O NST: CPT | Performed by: OBSTETRICS & GYNECOLOGY

## 2022-05-26 PROCEDURE — 76816 OB US FOLLOW-UP PER FETUS: CPT | Performed by: OBSTETRICS & GYNECOLOGY

## 2022-05-26 NOTE — PATIENT INSTRUCTIONS
Preparing for Childbirth: Care Instructions  Overview     You are getting close to the birth of your child. For months, you've been taking care of yourself and the baby. Now you can still take steps that will help you have a healthy labor and birth. You can take classes to help prepare for the birth. You also can talk with your doctor about what you would like to happen during your labor. Changes happen in the last 2 months of your pregnancy. Your baby becomes too big to move around easily inside the uterus and may seem to move less. At the end of your pregnancy, your baby probably will settle into a head-down position. You will likely feel some pressure in your pelvis as you get close to the birth. You may notice times when your belly tightens and becomes firm to the touch, then relaxes. These are called Trimble Nunez contractions. They sometimes occur as often as every 10 to 20 minutes. These contractions usually stop when you are active. (True labor pains continue or increase if you move around.)  Rupture of your membranes (\"breaking of the water\") often is a sign that labor has started or is about to start. This happens when a hole or tear develops in the fluid-filled bag (amniotic sac) that surrounds and protects your baby. You may feel a huge gush of water or a steady trickle of fluid. Call your doctor or go to the hospital if you think this has happened. Contractions may start, or if you are already having contractions, they may get stronger. Follow-up care is a key part of your treatment and safety. Be sure to make and go to all appointments, and call your doctor if you are having problems. It's also a good idea to know your test results and keep a list of the medicines you take. How can you care for yourself at home? · Get plenty of rest.  · Take childbirth classes with your partner or . You will learn relaxation exercises that are helpful during labor.  You also will learn what you can do to manage labor pain. · If you have other children, take a class to learn how to help them adjust to the new baby. · Develop a written birth plan, if you choose to, keeping in mind that labor is hard to predict and your plan may change after labor begins. Some of what a birth plan may address includes:  ? Where you would like to have your baby. This includes the building and the room. It could be the hospital's birthing room, a separate birthing center, or your own home. ? Who you would like to assist with delivery of your baby. You may want your doctor, an obstetrician, or a certified nurse-midwife. Some women prefer a lay midwife or a  to provide support before and after delivery. ? Whether you want a , nurse, midwife, or  to give you support from early labor until after childbirth. ? What comfort measures you want. You may have to choose between walking around during labor or having the security of a heart monitor for your baby. You may want to listen to music during labor. You may know what position you want to be in (such as sitting, squatting, or reclining) for pushing. ? What pain relief you would like. There are several choices, so be sure to talk with your doctor about them. ? How you want you and your baby to spend the first few hours after birth. § You may want to keep your baby with you for at least 1 hour after birth for bonding and early breastfeeding. § You may want the hospital to delay some infant care steps, such as a vitamin K injection, so that you have calming time with your baby. § You may not want visitors, or you may want other family members there. · Know the early signs of labor, such as a steady ache low in your back. · If you are going to a hospital or clinic to have your baby, have your bag ready to go. ? Pack a nightgown, robe, panties, socks, and slippers. You may want to bring your own soap, shampoo, brush, toothbrush, and toothpaste.  Bring your own self-adhesive sanitary pads. ? In your baby's bag, bring an outfit, small blanket, and diapers. Have your car seat ready to go. When should you call for help? Watch closely for changes in your health, and be sure to contact your doctor if you have any questions about preparing for childbirth. Where can you learn more? Go to http://www.gray.com/  Enter [id number] in the search box to learn more about \"Preparing for Childbirth: Care Instructions. \"  Current as of: 2021               Content Version: 13.2  © 7187-1690 [employer]. Care instructions adapted under license by [employer] (which disclaims liability or warranty for this information). If you have questions about a medical condition or this instruction, always ask your healthcare professional. [address] any warranty or liability for your use of this information. Weeks 34 to 36 of Your Pregnancy: Care Instructions  Overview     By now, your baby and your belly have grown quite large. It's almost time to give birth! Your baby's lungs are almost ready to breathe air. The skull bones are firm enough to protect your baby's head, but soft enough to move down through the birth canal.  You may be feeling excited and happy at times--but also anxious or scared. You might wonder how you'll know if you're in labor or what to expect during labor. Try to be open and flexible in your expectations of the birth. Because each birth is different, there's no way to know exactly what childbirth will be like for you. Talk to your doctor or midwife about any concerns you have. If you haven't already had the Tdap shot during this pregnancy, talk to your doctor about getting it. It will help protect your  against pertussis infection. In the 36th week, you'll probably have a test for group B streptococcus (GBS).  GBS is a common type of bacteria that can live in the vagina and rectum. It can make your baby sick after birth. If you test positive, you will get antibiotics during labor. The medicine will help keep your baby from getting the bacteria. Follow-up care is a key part of your treatment and safety. Be sure to make and go to all appointments, and call your doctor if you are having problems. It's also a good idea to know your test results and keep a list of the medicines you take. How can you care for yourself at home? Learn about pain relief choices  · Pain is different for everyone. Talk with your doctor about your feelings about pain. · You can choose from several types of pain relief. These include medicine, breathing techniques, and comfort measures. You can use more than one option. · If you choose to have pain medicine during labor, talk to your doctor about your options. Some medicines lower anxiety and help with some of the pain. Others make your lower body numb so that you won't feel pain. · Be sure to tell your doctor about your pain medicine choice before you start labor or very early in your labor. You may be able to change your mind as labor progresses. Labor and delivery  · The first stage of labor has three parts: early, active, and transition. ? It's common to have early labor at home. You can stay busy or rest, eat light snacks, drink clear fluids, and start counting contractions. ? When talking during a contraction gets hard, you may be moving to active labor. During active labor, you should head for the hospital if you aren't there already. ? You are in active labor when contractions come every 3 to 4 minutes and last about 60 seconds. Your cervix is opening more rapidly. ? If your water breaks, contractions will come faster and stronger. ? During transition, your cervix is stretching, and contractions are coming more rapidly. ? You may want to push, but your cervix might not be ready. Your doctor will tell you when to push.   · The second stage starts when your cervix is completely opened and you are ready to push. ? Contractions are very strong to push the baby down the birth canal.  ? You will probably feel the urge to push. You may feel like you need to have a bowel movement. ? You may be coached to push with contractions. These contractions will be very strong, but you won't have them as often. You can get a little rest between contractions. ? One last push, and your baby is born. · The third stage is when a few more contractions push out the placenta. This may take 30 minutes or less. Where can you learn more? Go to http://www.gray.com/  Enter B912 in the search box to learn more about \"Weeks 34 to 36 of Your Pregnancy: Care Instructions. \"  Current as of: June 16, 2021               Content Version: 13.2  © 1865-3366 [employer]. Care instructions adapted under license by [employer] (which disclaims liability or warranty for this information). If you have questions about a medical condition or this instruction, always ask your healthcare professional. [address] any warranty or liability for your use of this information.

## 2022-05-26 NOTE — PROGRESS NOTES
_ [facility]  [email or web address]  [phone]    Jackelyn Pedraza MD, FACOG     Follow-up OB visit    Chief Complaint   Patient presents with   [facility] Routine Prenatal Visit       Patient Active Problem List    Diagnosis Date Noted    Prenatal care of primigravida, antepartum 02/15/2022    Depression     Learning disability     Hearing loss, left     GERD (gastroesophageal reflux disease)           The patient reports the following concerns: doing well, no concerns. gbs today, cillin allergy. magic hour & what to expect handout, prepared childbirth handout in  [address], pt aware. advised weekly appts until delivery. MFM today at 8. pt wants the OK from MD  to take iron & vit c that pt brought with her, pt has not started taking yet. Vitals:    22 0841   BP: 124/77   Weight: 218 lb 3.2 oz (99 kg)   Height: 5' 6\" (1.676 m)     See PN flowsheet for exam    32 y.o.  36w3d   Encounter Diagnosis   Name Primary?     Prenatal care of primigravida, antepartum Yes     Keep MFM fu today   [] SAB/bleeding precautions reviewed   [] PTL/PPROM precautions reviewed   [x] Labor precautions reviewed   [x] Fetal kick counts discussed   [] Labs reviewed with patient   [] [place] precautions reviewed   [] Consent reviewed   [] Handouts given to pt   [] Glucola handout    [] GBS/labor/Magic Hour handout   []    [] We reviewed CDC recommendations for Tdap for patient and close contacts and RBA of receiving in pregnancy, advised obtaining in third trimester   [] Reviewed healthy nutrition in pregnancy and good exercise practices   [] We disc safer medications in pregnancy and referred patient to [facility] THAI resources   [] We reviewed CDC recommendations for flu vaccine and RBA of receiving in pregnancy   []    []    []           Orders Placed This Encounter   Rad Pelletier MD

## 2022-05-28 LAB — GP B STREP DNA SPEC QL NAA+PROBE: NEGATIVE

## 2022-05-31 NOTE — PROGRESS NOTES
Written by Marcy Law MD on 5/29/2022 10:12 AM EDT View Full Comments  Seen by patient Brenda Waldrop on 5/29/2022 12:01 PM      Updated PL other results in synopsis

## 2022-06-01 NOTE — PATIENT INSTRUCTIONS
Preparing for Childbirth: Care Instructions  Overview     You are getting close to the birth of your child. For months, you've been taking care of yourself and the baby. Now you can still take steps that will help you have a healthy labor and birth. You can take classes to help prepare for the birth. You also can talk with your doctor about what you would like to happen during your labor. Changes happen in the last 2 months of your pregnancy. Your baby becomes too big to move around easily inside the uterus and may seem to move less. At the end of your pregnancy, your baby probably will settle into a head-down position. You will likely feel some pressure in your pelvis as you get close to the birth. You may notice times when your belly tightens and becomes firm to the touch, then relaxes. These are called Dane Nunez contractions. They sometimes occur as often as every 10 to 20 minutes. These contractions usually stop when you are active. (True labor pains continue or increase if you move around.)  Rupture of your membranes (\"breaking of the water\") often is a sign that labor has started or is about to start. This happens when a hole or tear develops in the fluid-filled bag (amniotic sac) that surrounds and protects your baby. You may feel a huge gush of water or a steady trickle of fluid. Call your doctor or go to the hospital if you think this has happened. Contractions may start, or if you are already having contractions, they may get stronger. Follow-up care is a key part of your treatment and safety. Be sure to make and go to all appointments, and call your doctor if you are having problems. It's also a good idea to know your test results and keep a list of the medicines you take. How can you care for yourself at home? · Get plenty of rest.  · Take childbirth classes with your partner or . You will learn relaxation exercises that are helpful during labor.  You also will learn what you can do to manage labor pain. · If you have other children, take a class to learn how to help them adjust to the new baby. · Develop a written birth plan, if you choose to, keeping in mind that labor is hard to predict and your plan may change after labor begins. Some of what a birth plan may address includes:  ? Where you would like to have your baby. This includes the building and the room. It could be the hospital's birthing room, a separate birthing center, or your own home. ? Who you would like to assist with delivery of your baby. You may want your doctor, an obstetrician, or a certified nurse-midwife. Some women prefer a lay midwife or a  to provide support before and after delivery. ? Whether you want a , nurse, midwife, or  to give you support from early labor until after childbirth. ? What comfort measures you want. You may have to choose between walking around during labor or having the security of a heart monitor for your baby. You may want to listen to music during labor. You may know what position you want to be in (such as sitting, squatting, or reclining) for pushing. ? What pain relief you would like. There are several choices, so be sure to talk with your doctor about them. ? How you want you and your baby to spend the first few hours after birth. § You may want to keep your baby with you for at least 1 hour after birth for bonding and early breastfeeding. § You may want the hospital to delay some infant care steps, such as a vitamin K injection, so that you have calming time with your baby. § You may not want visitors, or you may want other family members there. · Know the early signs of labor, such as a steady ache low in your back. · If you are going to a hospital or clinic to have your baby, have your bag ready to go. ? Pack a nightgown, robe, panties, socks, and slippers. You may want to bring your own soap, shampoo, brush, toothbrush, and toothpaste.  Bring your own self-adhesive sanitary pads. ? In your baby's bag, bring an outfit, small blanket, and diapers. Have your car seat ready to go. When should you call for help? Watch closely for changes in your health, and be sure to contact your doctor if you have any questions about preparing for childbirth. Where can you learn more? Go to http://www.gray.com/  Enter [id number] in the search box to learn more about \"Preparing for Childbirth: Care Instructions. \"  Current as of: June 16, 2021               Content Version: 13.2  © 9654-1908 "[employer]". Care instructions adapted under license by [employer] (which disclaims liability or warranty for this information). If you have questions about a medical condition or this instruction, always ask your healthcare professional. [address] any warranty or liability for your use of this information.

## 2022-06-01 NOTE — PROGRESS NOTES
[facility]  [email or web address]  [phone]    Hemanth Lomas MD, FACOG     Follow-up OB visit    Chief Complaint   Patient presents with   [facility] Routine Prenatal Visit       Patient Active Problem List    Diagnosis Date Noted    Prenatal care of primigravida, antepartum 02/15/2022    Depression     Learning disability     Hearing loss, left     GERD (gastroesophageal reflux disease)           The patient reports the following concerns: GBS neg. doing well no concerns. denies [address] [facility]. reports MFM appt today at 1045am & seeing MFM weekly. Vitals:    22 0824   BP: 133/84   Pulse: 89   Weight: 218 lb (98.9 kg)   Height: 5' 6\" (1.676 m)     See PN flowsheet for exam    32 y.o.  37w2d   Encounter Diagnosis   Name Primary?  Prenatal care of primigravida, antepartum Yes         Keep mfm fu, has us today  Disc concerns re induction and transportation concerns     [] SAB/bleeding precautions reviewed   [] PTL/PPROM precautions reviewed   [x] Labor precautions reviewed   [x] Fetal kick counts discussed   [] Labs reviewed with patient   [] [place] precautions reviewed   [] Consent reviewed   [] Handouts given to pt   [] Glucola handout    [] GBS/labor/Magic Hour handout   []    [] We reviewed CDC recommendations for Tdap for patient and close contacts and RBA of receiving in pregnancy, advised obtaining in third trimester   [] Reviewed healthy nutrition in pregnancy and good exercise practices   [] We disc safer medications in pregnancy and referred patient to [facility]   [] We reviewed CDC recommendations for flu vaccine and RBA of receiving in pregnancy   []    []    []           No orders of the defined types were placed in this encounter.       Hemanth Lomas MD

## 2022-06-02 ENCOUNTER — ROUTINE PRENATAL (OUTPATIENT)
Dept: OBGYN CLINIC | Age: 27
End: 2022-06-02
Payer: MEDICAID

## 2022-06-02 ENCOUNTER — HOSPITAL ENCOUNTER (OUTPATIENT)
Dept: PERINATAL CARE | Age: 27
Discharge: HOME | End: 2022-06-02
Attending: OBSTETRICS & GYNECOLOGY
Payer: MEDICAID

## 2022-06-02 VITALS
HEART RATE: 89 BPM | BODY MASS INDEX: 35.03 KG/M2 | WEIGHT: 218 LBS | HEIGHT: 66 IN | SYSTOLIC BLOOD PRESSURE: 133 MMHG | DIASTOLIC BLOOD PRESSURE: 84 MMHG

## 2022-06-02 DIAGNOSIS — Z34.00 PRENATAL CARE OF PRIMIGRAVIDA, ANTEPARTUM: Primary | ICD-10-CM

## 2022-06-02 PROCEDURE — 0502F SUBSEQUENT PRENATAL CARE: CPT | Performed by: OBSTETRICS & GYNECOLOGY

## 2022-06-02 PROCEDURE — 76819 FETAL BIOPHYS PROFIL W/O NST: CPT | Performed by: OBSTETRICS & GYNECOLOGY

## 2022-06-02 PROCEDURE — 76820 UMBILICAL ARTERY ECHO: CPT | Performed by: OBSTETRICS & GYNECOLOGY

## 2022-06-02 RX ORDER — LANOLIN ALCOHOL/MO/W.PET/CERES
CREAM (GRAM) TOPICAL
COMMUNITY
End: 2022-08-18

## 2022-06-04 NOTE — PROGRESS NOTES
Did [facility] send for [facility] labs or do we need to do at . Zahida Rodriguez?

## 2022-06-07 NOTE — PROGRESS NOTES
Called M. They want us to draw the labs.  They typically don't do labs there & will usually add it in ultrasound reports so we can do it THAI    Added to PL to collect labs

## 2022-06-08 ENCOUNTER — TELEPHONE (OUTPATIENT)
Dept: OBGYN CLINIC | Age: 27
End: 2022-06-08

## 2022-06-08 NOTE — TELEPHONE ENCOUNTER
32year old  45 w2d pregnant   Patient calling about her appointment on 6/15/2022 for procedure   Patient was advised that it is for induction and patient advised to write down all her questions about the induction and next steps to ask MD at her visit tomorrow    Patient verbalized understanding.

## 2022-06-09 ENCOUNTER — ROUTINE PRENATAL (OUTPATIENT)
Dept: OBGYN CLINIC | Age: 27
End: 2022-06-09
Payer: MEDICAID

## 2022-06-09 ENCOUNTER — HOSPITAL ENCOUNTER (OUTPATIENT)
Dept: PERINATAL CARE | Age: 27
Discharge: HOME | End: 2022-06-09
Attending: OBSTETRICS & GYNECOLOGY
Payer: MEDICAID

## 2022-06-09 VITALS
SYSTOLIC BLOOD PRESSURE: 129 MMHG | BODY MASS INDEX: 34.97 KG/M2 | WEIGHT: 217.6 LBS | DIASTOLIC BLOOD PRESSURE: 87 MMHG | HEIGHT: 66 IN

## 2022-06-09 DIAGNOSIS — Z34.00 PRENATAL CARE OF PRIMIGRAVIDA, ANTEPARTUM: Primary | ICD-10-CM

## 2022-06-09 PROCEDURE — 0502F SUBSEQUENT PRENATAL CARE: CPT | Performed by: OBSTETRICS & GYNECOLOGY

## 2022-06-09 PROCEDURE — 76820 UMBILICAL ARTERY ECHO: CPT | Performed by: OBSTETRICS & GYNECOLOGY

## 2022-06-09 PROCEDURE — 76819 FETAL BIOPHYS PROFIL W/O NST: CPT | Performed by: OBSTETRICS & GYNECOLOGY

## 2022-06-09 RX ORDER — ASCORBIC ACID 250 MG
TABLET ORAL
COMMUNITY
End: 2022-09-20

## 2022-06-09 NOTE — PROGRESS NOTES
[facility]  [email or web address]  [phone]    Selina Pinzon MD, FACOG     Follow-up OB visit    Chief Complaint   Patient presents with   [facility] Routine Prenatal Visit       Patient Active Problem List    Diagnosis Date Noted    Prenatal care of primigravida, antepartum 02/15/2022    Depression     Learning disability     Hearing loss, left     GERD (gastroesophageal reflux disease)           The patient reports the following concerns: doing well, IOL 6/15/22. POSS MEEKS. Will need to schedule if needed. Vitals:    22 0839   BP: 129/87   Weight: 217 lb 9.6 oz (98.7 kg)   Height: 5' 6\" (1.676 m)     See PN flowsheet for exam    32 y.o.  38w3d   Encounter Diagnosis   Name Primary?  Prenatal care of primigravida, antepartum Yes       acog iol ho  tp iol ho given  tp labor ho given  Disc r/b/a of induction and pitocin use and increase risk of longer labor,  section, bleeding and infection. Pt's page of questions answered. Disc plan for meeks, and then to LD for IOL     [] SAB/bleeding precautions reviewed   [] PTL/PPROM precautions reviewed   [] Labor precautions reviewed   [] Fetal kick counts discussed   [] Labs reviewed with patient   [] Mariann Finner precautions reviewed   [] Consent reviewed   [] Handouts given to pt   [] Glucola handout    [] GBS/labor/Magic Hour handout   []    [] We reviewed CDC recommendations for Tdap for patient and close contacts and RBA of receiving in pregnancy, advised obtaining in third trimester   [] Reviewed healthy nutrition in pregnancy and good exercise practices   [] We disc safer medications in pregnancy and referred patient to [facility] THAI resources   [] We reviewed CDC recommendations for flu vaccine and RBA of receiving in pregnancy   []    []    []           No orders of the defined types were placed in this encounter.       Selina Pinzon MD

## 2022-06-13 ENCOUNTER — TELEPHONE (OUTPATIENT)
Dept: OBGYN CLINIC | Age: 27
End: 2022-06-13

## 2022-06-13 NOTE — TELEPHONE ENCOUNTER
32year old patient G1P 39wod pregnant     Patient calling saying that someone called her this am about her induction  This nurse confirmed appointment tomorrow and to arrive at 4:15PM and to arrive at 4:00PM for the meeks and then induction the next day    Patient verbalized understanding.

## 2022-06-14 ENCOUNTER — ROUTINE PRENATAL (OUTPATIENT)
Dept: OBGYN CLINIC | Age: 27
End: 2022-06-14
Payer: MEDICAID

## 2022-06-14 ENCOUNTER — HOSPITAL ENCOUNTER (INPATIENT)
Age: 27
LOS: 3 days | Discharge: HOME | DRG: 560 | End: 2022-06-17
Attending: OBSTETRICS & GYNECOLOGY | Admitting: OBSTETRICS & GYNECOLOGY
Payer: MEDICAID

## 2022-06-14 VITALS — WEIGHT: 217.8 LBS | BODY MASS INDEX: 35.15 KG/M2 | DIASTOLIC BLOOD PRESSURE: 96 MMHG | SYSTOLIC BLOOD PRESSURE: 140 MMHG

## 2022-06-14 DIAGNOSIS — Z34.90 ENCOUNTER FOR PLANNED INDUCTION OF LABOR: Primary | ICD-10-CM

## 2022-06-14 PROBLEM — O36.5990 PREGNANCY AFFECTED BY FETAL GROWTH RESTRICTION: Status: ACTIVE | Noted: 2022-06-14

## 2022-06-14 PROBLEM — Z3A.39 39 WEEKS GESTATION OF PREGNANCY: Status: ACTIVE | Noted: 2022-06-14

## 2022-06-14 LAB
ABO + RH BLD: NORMAL
ALBUMIN SERPL-MCNC: 2.8 G/DL (ref 3.5–5)
ALBUMIN/GLOB SERPL: 0.8 {RATIO} (ref 1.1–2.2)
ALP SERPL-CCNC: 245 U/L (ref 45–117)
ALT SERPL-CCNC: 24 U/L (ref 12–78)
ANION GAP SERPL CALC-SCNC: 12 MMOL/L (ref 5–15)
AST SERPL-CCNC: 23 U/L (ref 15–37)
BASOPHILS # BLD: 0 K/UL (ref 0–0.1)
BASOPHILS NFR BLD: 0 % (ref 0–1)
BILIRUB SERPL-MCNC: 0.5 MG/DL (ref 0.2–1)
BLOOD GROUP ANTIBODIES SERPL: NORMAL
BUN SERPL-MCNC: 10 MG/DL (ref 6–20)
BUN/CREAT SERPL: 18 (ref 12–20)
CALCIUM SERPL-MCNC: 8.7 MG/DL (ref 8.5–10.1)
CHLORIDE SERPL-SCNC: 107 MMOL/L (ref 97–108)
CO2 SERPL-SCNC: 21 MMOL/L (ref 21–32)
CREAT SERPL-MCNC: 0.55 MG/DL (ref 0.55–1.02)
DIFFERENTIAL METHOD BLD: ABNORMAL
EOSINOPHIL # BLD: 0.1 K/UL (ref 0–0.4)
EOSINOPHIL NFR BLD: 0 % (ref 0–7)
ERYTHROCYTE [DISTWIDTH] IN BLOOD BY AUTOMATED COUNT: 16.3 % (ref 11.5–14.5)
GLOBULIN SER CALC-MCNC: 3.7 G/DL (ref 2–4)
GLUCOSE SERPL-MCNC: 76 MG/DL (ref 65–100)
HCT VFR BLD AUTO: 37 % (ref 35–47)
HGB BLD-MCNC: 12.4 G/DL (ref 11.5–16)
IMM GRANULOCYTES # BLD AUTO: 0 K/UL (ref 0–0.04)
IMM GRANULOCYTES NFR BLD AUTO: 0 % (ref 0–0.5)
LYMPHOCYTES # BLD: 3.2 K/UL (ref 0.8–3.5)
LYMPHOCYTES NFR BLD: 25 % (ref 12–49)
MCH RBC QN AUTO: 29.5 PG (ref 26–34)
MCHC RBC AUTO-ENTMCNC: 33.5 G/DL (ref 30–36.5)
MCV RBC AUTO: 88.1 FL (ref 80–99)
MONOCYTES # BLD: 0.6 K/UL (ref 0–1)
MONOCYTES NFR BLD: 5 % (ref 5–13)
NEUTS SEG # BLD: 8.7 K/UL (ref 1.8–8)
NEUTS SEG NFR BLD: 70 % (ref 32–75)
NRBC # BLD: 0 K/UL (ref 0–0.01)
NRBC BLD-RTO: 0 PER 100 WBC
PLATELET # BLD AUTO: 215 K/UL (ref 150–400)
PMV BLD AUTO: 11.3 FL (ref 8.9–12.9)
POTASSIUM SERPL-SCNC: 4.4 MMOL/L (ref 3.5–5.1)
PROT SERPL-MCNC: 6.5 G/DL (ref 6.4–8.2)
RBC # BLD AUTO: 4.2 M/UL (ref 3.8–5.2)
SODIUM SERPL-SCNC: 140 MMOL/L (ref 136–145)
SPECIMEN EXP DATE BLD: NORMAL
WBC # BLD AUTO: 12.5 K/UL (ref 3.6–11)

## 2022-06-14 PROCEDURE — 59200 INSERT CERVICAL DILATOR: CPT | Performed by: OBSTETRICS & GYNECOLOGY

## 2022-06-14 PROCEDURE — 65270000029 HC RM PRIVATE

## 2022-06-14 PROCEDURE — 74011250637 HC RX REV CODE- 250/637: Performed by: OBSTETRICS & GYNECOLOGY

## 2022-06-14 PROCEDURE — 85025 COMPLETE CBC W/AUTO DIFF WBC: CPT

## 2022-06-14 PROCEDURE — 80053 COMPREHEN METABOLIC PANEL: CPT

## 2022-06-14 PROCEDURE — 86901 BLOOD TYPING SEROLOGIC RH(D): CPT

## 2022-06-14 PROCEDURE — 75410000002 HC LABOR FEE PER 1 HR: Performed by: OBSTETRICS & GYNECOLOGY

## 2022-06-14 PROCEDURE — 36415 COLL VENOUS BLD VENIPUNCTURE: CPT

## 2022-06-14 RX ORDER — OXYTOCIN/RINGER'S LACTATE 30/500 ML
0-20 PLASTIC BAG, INJECTION (ML) INTRAVENOUS
Status: DISCONTINUED | OUTPATIENT
Start: 2022-06-15 | End: 2022-06-15 | Stop reason: HOSPADM

## 2022-06-14 RX ORDER — BUTORPHANOL TARTRATE 2 MG/ML
2 INJECTION INTRAMUSCULAR; INTRAVENOUS
Status: CANCELLED | OUTPATIENT
Start: 2022-06-14

## 2022-06-14 RX ADMIN — Medication 25 MCG: at 20:07

## 2022-06-14 NOTE — PROGRESS NOTES
1825 pt aware that she will be getting a dose of cytotec, pt asking if it causing contractions is a good thing.   Discussed this with dr Fabian Friday dr Tiffanie Clarke in and reviewed plan of care with pt, pt agreed to plan of care

## 2022-06-14 NOTE — PROGRESS NOTES
56- SBAR received from Tod 7- This RN at bedside to assume POC    2205- Pt's meeks bulb falls out at this time    0710Latameka Thorne MD calls to notify this RN she will be at bedside soon to check patient     0715- SBAR to oncoming RN

## 2022-06-14 NOTE — PROGRESS NOTES
María Spring MD, FACOG       Chart reviewed for the following:   Reed HINES, have reviewed the pertinent history and updated the medications and allergic reactions for [employer]. TIME OUT performed immediately prior to start of procedure:   Reed HINES, have performed the following reviews on [employer] prior to the start of the procedure:            * Patient was identified by name and date of birth   * Agreement on procedure being performed was verified  * Risks and Benefits explained to the patient  * Procedure site verified and marked as necessary  * Patient was positioned for comfort  * Consent was signed and verified     Time: 4:11 PM      Date of procedure: 2022    Procedure performed by:  Chin Mendez MD    Provider assisted by: Thermon Moritz, [address] [place] [address]    Patient assisted by: delano    How tolerated by patient: tolerated the procedure well with no complications    Comments: none    I was present for the entire procedure and agree with the above attestation. María Spring MD      Cervical Meeks insertion Procedure Note    Kim is a , 32 y.o. 39w1d who presents today far placement of a Cook catheter in the cervix for ripening. Her cervix is unfavorable and she is scheduled for induction tomorrow. She has elected to have a cervical Cook catheter placement today. The risks, benefits and assets of the procedure were discussed. Her questions were answered. PROCEDURE: The meeks catheter was prepped with Betadine. A 60 Sinhala meeks catheter was placed through the cervix without difficulty. The bulb was inflated with 60 cc of saline. The vaginal balloon was filled with 80 cc saline. Bleeding was minimal. The patient's level of discomfort was minimal.    POST PROCEDURE: The patient tolerated the procedure well. There were no complications. She was observed for 5 minutes.   Report was called to Labor and Delivery and she was admitted for induction of labor. She was instructed to check in to Labor and Delivery upon discharge from the office.

## 2022-06-14 NOTE — H&P
History & Physical    Name: Adryan Morfin MRN: [id number]  SSN: [id number]    YOB: 1995  Age: 32 y.o. Sex: female        Subjective:     Estimated Date of Delivery: 22  OB History        1    Para   0    Term   0       0    AB   0    Living   0       SAB   0    IAB   0    Ectopic   0    Molar   0    Multiple   0    Live Births   0                Ms. Afton Dakin is admitted with pregnancy at 36w3d for induction of labor. Prenatal course was complicated by fetal growth restriction. Her induction is indicated for fetal growth restriction . Bps also mildly elevated. No sx pre E.     Group B Strep was negative. Past Medical History:   Diagnosis Date    Depression     Anxiety    Encounter for Papanicolaou smear for cervical cancer screening 2021    neg/hpv not tested    LANCE (generalized anxiety disorder)     GERD (gastroesophageal reflux disease)     Hearing loss, left     Learning disability     reading    Obesity (BMI 35.0-39.9 without comorbidity)      Past Surgical History:   Procedure Laterality Date    HX OTHER SURGICAL  2019    wisdom teeth     Social History     Occupational History    Not on file   Tobacco Use    Smoking status: Never Smoker    Smokeless tobacco: Never Used   Vaping Use    Vaping Use: Never used   Substance and Sexual Activity    Alcohol use: Never    Drug use: Never    Sexual activity: Yes     Birth control/protection: None     Family History   Problem Relation Age of Onset    No Known Problems Mother     No Known Problems Father     Diabetes Paternal Grandmother     No Known Problems Half-sister     No Known Problems Half-brother     No Known Problems Half-brother        Allergies   Allergen Reactions    Amoxicillin Hives    Grass Pollen Hives and Rash    Kiwi Hives    Penicillins Hives     Prior to Admission medications    Medication Sig Start Date End Date Taking?  Authorizing Provider   ascorbic acid, vitamin C, (Vitamin C) 250 mg tablet Take  by mouth. Yes Provider, Historical   BABY ASPIRIN PO Take  by mouth. Yes Provider, Historical   ferrous sulfate (Iron) 325 mg (65 mg iron) tablet Take  by mouth Daily (before breakfast). Yes Provider, Historical   omeprazole (PRILOSEC) 40 mg capsule Take 1 Capsule by mouth daily. 9/23/21  Yes Chris Singh MD   prenatal vit86-iron-folic acid (Nestabs) 20-5,081 mg-mcg tab Take 1 Tablet by mouth daily. 7/27/21  Yes Chris Singh MD        Review of Systems: A comprehensive review of systems was negative except for that written in the HPI. Objective:     Vitals:  Vitals:    06/14/22 1653 06/14/22 1654 06/14/22 1702 06/14/22 1723   BP: (!) 151/92  (!) 141/86    Pulse: 90  81    Temp:    98 °F (36.7 °C)   Weight:  217 lb (98.4 kg)     Height:  5' 5\" (1.651 m)          Physical Exam:  Patient without distress.   Heart: Regular rate and rhythm  Lung: normal respiratory effort  Abdomen: soft, nontender  Fundus: soft and non tender  Cervical Exam: 1 cm dilated    50% effaced    -3 station    Presenting Part: cephalic  Cervical Position: posterior  Consistency: Medium  Lower Extremities:  - Edema trace  Membranes:  Intact  Fetal Heart Rate: Reactive    Prenatal Labs:   Lab Results   Component Value Date/Time    Rubella, External NI 01/12/2022 12:00 AM        Assessment/Plan:   Fetal growth restriction   Elevated BP    Plan:  induction of labor   Cook catheter   Misoprostol   Pre E labs     Signed By:  Simi Vitale MD     June 14, 2022

## 2022-06-14 NOTE — PROGRESS NOTES
1629 Entered room to find pt in bed with partner Amanda Pineda) at bedside. This RN discussed POC, signed consents, labs obtained. Pt denies N/V, HA, epigastric pain, visual disturbances, LOF or bleeding. 5400 David Olivo MD at bedside discussing POC. VORB to obtain a PCR. This RN instructed pt to call out when she needed to void. 1906 Bedside and Verbal shift change report given to MARCO Sarabia (oncoming nurse) by Malcom Salcedo RN (offgoing nurse). Report included the following information SBAR, MAR and Med Rec Status.

## 2022-06-15 ENCOUNTER — ANESTHESIA EVENT (OUTPATIENT)
Dept: LABOR AND DELIVERY | Age: 27
DRG: 560 | End: 2022-06-15
Payer: MEDICAID

## 2022-06-15 ENCOUNTER — ANESTHESIA (OUTPATIENT)
Dept: LABOR AND DELIVERY | Age: 27
DRG: 560 | End: 2022-06-15
Payer: MEDICAID

## 2022-06-15 LAB
CREAT UR-MCNC: 319 MG/DL
PROT UR-MCNC: 65 MG/DL (ref 0–11.9)
PROT/CREAT UR-RTO: 0.2

## 2022-06-15 PROCEDURE — 77030005513 HC CATH URETH FOL11 MDII -B

## 2022-06-15 PROCEDURE — 0HQ9XZZ REPAIR PERINEUM SKIN, EXTERNAL APPROACH: ICD-10-PCS | Performed by: OBSTETRICS & GYNECOLOGY

## 2022-06-15 PROCEDURE — 10H073Z INSERTION OF MONITORING ELECTRODE INTO PRODUCTS OF CONCEPTION, VIA NATURAL OR ARTIFICIAL OPENING: ICD-10-PCS | Performed by: OBSTETRICS & GYNECOLOGY

## 2022-06-15 PROCEDURE — 74011000250 HC RX REV CODE- 250: Performed by: ANESTHESIOLOGY

## 2022-06-15 PROCEDURE — 59400 OBSTETRICAL CARE: CPT | Performed by: OBSTETRICS & GYNECOLOGY

## 2022-06-15 PROCEDURE — 77030019905 HC CATH URETH INTMIT MDII -A

## 2022-06-15 PROCEDURE — 77010026065 HC OXYGEN MINIMUM MEDICAL AIR: Performed by: OBSTETRICS & GYNECOLOGY

## 2022-06-15 PROCEDURE — 3E0DXGC INTRODUCTION OF OTHER THERAPEUTIC SUBSTANCE INTO MOUTH AND PHARYNX, EXTERNAL APPROACH: ICD-10-PCS | Performed by: OBSTETRICS & GYNECOLOGY

## 2022-06-15 PROCEDURE — 77030010848 HC CATH INTUTR PRSS KOLB -B

## 2022-06-15 PROCEDURE — 75410000002 HC LABOR FEE PER 1 HR: Performed by: OBSTETRICS & GYNECOLOGY

## 2022-06-15 PROCEDURE — 74011250636 HC RX REV CODE- 250/636: Performed by: OBSTETRICS & GYNECOLOGY

## 2022-06-15 PROCEDURE — 84156 ASSAY OF PROTEIN URINE: CPT

## 2022-06-15 PROCEDURE — 00HU33Z INSERTION OF INFUSION DEVICE INTO SPINAL CANAL, PERCUTANEOUS APPROACH: ICD-10-PCS | Performed by: OBSTETRICS & GYNECOLOGY

## 2022-06-15 PROCEDURE — 4A1H74Z MONITORING OF PRODUCTS OF CONCEPTION, CARDIAC ELECTRICAL ACTIVITY, VIA NATURAL OR ARTIFICIAL OPENING: ICD-10-PCS | Performed by: OBSTETRICS & GYNECOLOGY

## 2022-06-15 PROCEDURE — 4A1HXCZ MONITORING OF PRODUCTS OF CONCEPTION, CARDIAC RATE, EXTERNAL APPROACH: ICD-10-PCS | Performed by: OBSTETRICS & GYNECOLOGY

## 2022-06-15 PROCEDURE — 75410000003 HC RECOV DEL/VAG/CSECN EA 0.5 HR: Performed by: OBSTETRICS & GYNECOLOGY

## 2022-06-15 PROCEDURE — 75410000000 HC DELIVERY VAGINAL/SINGLE: Performed by: OBSTETRICS & GYNECOLOGY

## 2022-06-15 PROCEDURE — 65270000029 HC RM PRIVATE

## 2022-06-15 PROCEDURE — 77030014125 HC TY EPDRL BBMI -B: Performed by: ANESTHESIOLOGY

## 2022-06-15 PROCEDURE — 74011250637 HC RX REV CODE- 250/637: Performed by: OBSTETRICS & GYNECOLOGY

## 2022-06-15 PROCEDURE — 10H07YZ INSERTION OF OTHER DEVICE INTO PRODUCTS OF CONCEPTION, VIA NATURAL OR ARTIFICIAL OPENING: ICD-10-PCS | Performed by: OBSTETRICS & GYNECOLOGY

## 2022-06-15 PROCEDURE — 76060000078 HC EPIDURAL ANESTHESIA: Performed by: ANESTHESIOLOGY

## 2022-06-15 RX ORDER — LIDOCAINE HYDROCHLORIDE 10 MG/ML
INJECTION INFILTRATION; PERINEURAL AS NEEDED
Status: DISCONTINUED | OUTPATIENT
Start: 2022-06-15 | End: 2022-06-15 | Stop reason: HOSPADM

## 2022-06-15 RX ORDER — ONDANSETRON 4 MG/1
4 TABLET, ORALLY DISINTEGRATING ORAL
Status: DISCONTINUED | OUTPATIENT
Start: 2022-06-15 | End: 2022-06-17 | Stop reason: HOSPADM

## 2022-06-15 RX ORDER — LIDOCAINE HYDROCHLORIDE AND EPINEPHRINE 15; 5 MG/ML; UG/ML
INJECTION, SOLUTION EPIDURAL AS NEEDED
Status: DISCONTINUED | OUTPATIENT
Start: 2022-06-15 | End: 2022-06-15 | Stop reason: HOSPADM

## 2022-06-15 RX ORDER — SODIUM CHLORIDE 0.9 % (FLUSH) 0.9 %
5-40 SYRINGE (ML) INJECTION AS NEEDED
Status: DISCONTINUED | OUTPATIENT
Start: 2022-06-15 | End: 2022-06-17 | Stop reason: HOSPADM

## 2022-06-15 RX ORDER — OXYTOCIN/RINGER'S LACTATE 30/500 ML
500 PLASTIC BAG, INJECTION (ML) INTRAVENOUS
Status: DISCONTINUED | OUTPATIENT
Start: 2022-06-15 | End: 2022-06-17 | Stop reason: HOSPADM

## 2022-06-15 RX ORDER — DIPHENHYDRAMINE HCL 25 MG
25 CAPSULE ORAL
Status: DISCONTINUED | OUTPATIENT
Start: 2022-06-15 | End: 2022-06-17 | Stop reason: HOSPADM

## 2022-06-15 RX ORDER — IBUPROFEN 800 MG/1
800 TABLET ORAL EVERY 8 HOURS
Status: DISCONTINUED | OUTPATIENT
Start: 2022-06-15 | End: 2022-06-17 | Stop reason: HOSPADM

## 2022-06-15 RX ORDER — SODIUM CHLORIDE, SODIUM LACTATE, POTASSIUM CHLORIDE, CALCIUM CHLORIDE 600; 310; 30; 20 MG/100ML; MG/100ML; MG/100ML; MG/100ML
125 INJECTION, SOLUTION INTRAVENOUS CONTINUOUS
Status: DISCONTINUED | OUTPATIENT
Start: 2022-06-15 | End: 2022-06-17 | Stop reason: HOSPADM

## 2022-06-15 RX ORDER — NALOXONE HYDROCHLORIDE 0.4 MG/ML
0.4 INJECTION, SOLUTION INTRAMUSCULAR; INTRAVENOUS; SUBCUTANEOUS AS NEEDED
Status: DISCONTINUED | OUTPATIENT
Start: 2022-06-15 | End: 2022-06-17 | Stop reason: HOSPADM

## 2022-06-15 RX ORDER — IBUPROFEN 800 MG/1
800 TABLET ORAL EVERY 8 HOURS
Status: DISCONTINUED | OUTPATIENT
Start: 2022-06-15 | End: 2022-06-15

## 2022-06-15 RX ORDER — BUPIVACAINE HYDROCHLORIDE 2.5 MG/ML
INJECTION, SOLUTION EPIDURAL; INFILTRATION; INTRACAUDAL AS NEEDED
Status: DISCONTINUED | OUTPATIENT
Start: 2022-06-15 | End: 2022-06-15 | Stop reason: HOSPADM

## 2022-06-15 RX ORDER — EPHEDRINE SULFATE/0.9% NACL/PF 50 MG/5 ML
10 SYRINGE (ML) INTRAVENOUS
Status: DISCONTINUED | OUTPATIENT
Start: 2022-06-15 | End: 2022-06-15 | Stop reason: HOSPADM

## 2022-06-15 RX ORDER — OXYTOCIN/RINGER'S LACTATE 30/500 ML
10 PLASTIC BAG, INJECTION (ML) INTRAVENOUS AS NEEDED
Status: DISCONTINUED | OUTPATIENT
Start: 2022-06-15 | End: 2022-06-17 | Stop reason: HOSPADM

## 2022-06-15 RX ORDER — OXYTOCIN/RINGER'S LACTATE 30/500 ML
87.3 PLASTIC BAG, INJECTION (ML) INTRAVENOUS AS NEEDED
Status: DISCONTINUED | OUTPATIENT
Start: 2022-06-15 | End: 2022-06-17 | Stop reason: HOSPADM

## 2022-06-15 RX ORDER — LIDOCAINE HYDROCHLORIDE 20 MG/ML
INJECTION, SOLUTION INFILTRATION; PERINEURAL AS NEEDED
Status: DISCONTINUED | OUTPATIENT
Start: 2022-06-15 | End: 2022-06-15 | Stop reason: HOSPADM

## 2022-06-15 RX ORDER — BUPIVACAINE HYDROCHLORIDE 5 MG/ML
INJECTION, SOLUTION EPIDURAL; INTRACAUDAL AS NEEDED
Status: DISCONTINUED | OUTPATIENT
Start: 2022-06-15 | End: 2022-06-15 | Stop reason: HOSPADM

## 2022-06-15 RX ORDER — HYDROCODONE BITARTRATE AND ACETAMINOPHEN 5; 325 MG/1; MG/1
1 TABLET ORAL
Status: DISCONTINUED | OUTPATIENT
Start: 2022-06-15 | End: 2022-06-15

## 2022-06-15 RX ORDER — SODIUM CHLORIDE 0.9 % (FLUSH) 0.9 %
5-40 SYRINGE (ML) INJECTION EVERY 8 HOURS
Status: DISCONTINUED | OUTPATIENT
Start: 2022-06-15 | End: 2022-06-17 | Stop reason: HOSPADM

## 2022-06-15 RX ORDER — SIMETHICONE 80 MG
80 TABLET,CHEWABLE ORAL
Status: DISCONTINUED | OUTPATIENT
Start: 2022-06-15 | End: 2022-06-17 | Stop reason: HOSPADM

## 2022-06-15 RX ORDER — SODIUM CHLORIDE, SODIUM LACTATE, POTASSIUM CHLORIDE, CALCIUM CHLORIDE 600; 310; 30; 20 MG/100ML; MG/100ML; MG/100ML; MG/100ML
125 INJECTION, SOLUTION INTRAVENOUS CONTINUOUS
Status: DISCONTINUED | OUTPATIENT
Start: 2022-06-15 | End: 2022-06-15 | Stop reason: HOSPADM

## 2022-06-15 RX ORDER — LOPERAMIDE HYDROCHLORIDE 2 MG/1
2 CAPSULE ORAL
Status: DISCONTINUED | OUTPATIENT
Start: 2022-06-15 | End: 2022-06-17 | Stop reason: HOSPADM

## 2022-06-15 RX ORDER — ACETAMINOPHEN 325 MG/1
650 TABLET ORAL
Status: DISCONTINUED | OUTPATIENT
Start: 2022-06-15 | End: 2022-06-17 | Stop reason: HOSPADM

## 2022-06-15 RX ORDER — DOCUSATE SODIUM 100 MG/1
100 CAPSULE, LIQUID FILLED ORAL
Status: DISCONTINUED | OUTPATIENT
Start: 2022-06-15 | End: 2022-06-17 | Stop reason: HOSPADM

## 2022-06-15 RX ORDER — FENTANYL/BUPIVACAINE/NS/PF 2-1250MCG
1-16 PREFILLED PUMP RESERVOIR EPIDURAL CONTINUOUS
Status: DISCONTINUED | OUTPATIENT
Start: 2022-06-15 | End: 2022-06-15 | Stop reason: HOSPADM

## 2022-06-15 RX ORDER — HYDROCODONE BITARTRATE AND ACETAMINOPHEN 5; 325 MG/1; MG/1
1 TABLET ORAL
Status: DISCONTINUED | OUTPATIENT
Start: 2022-06-15 | End: 2022-06-17 | Stop reason: HOSPADM

## 2022-06-15 RX ADMIN — SODIUM CHLORIDE, POTASSIUM CHLORIDE, SODIUM LACTATE AND CALCIUM CHLORIDE 125 ML/HR: 600; 310; 30; 20 INJECTION, SOLUTION INTRAVENOUS at 11:06

## 2022-06-15 RX ADMIN — LIDOCAINE HYDROCHLORIDE 3 ML: 20 INJECTION, SOLUTION INFILTRATION; PERINEURAL at 10:57

## 2022-06-15 RX ADMIN — Medication 12 ML/HR: at 09:51

## 2022-06-15 RX ADMIN — OXYTOCIN 999 ML/HR: 10 INJECTION INTRAVENOUS at 18:05

## 2022-06-15 RX ADMIN — SODIUM CHLORIDE, POTASSIUM CHLORIDE, SODIUM LACTATE AND CALCIUM CHLORIDE 999 ML/HR: 600; 310; 30; 20 INJECTION, SOLUTION INTRAVENOUS at 08:00

## 2022-06-15 RX ADMIN — OXYTOCIN 2 MILLI-UNITS/MIN: 10 INJECTION INTRAVENOUS at 04:24

## 2022-06-15 RX ADMIN — IBUPROFEN 800 MG: 800 TABLET, FILM COATED ORAL at 20:52

## 2022-06-15 RX ADMIN — BUPIVACAINE HYDROCHLORIDE 10 ML: 2.5 INJECTION, SOLUTION EPIDURAL; INFILTRATION; INTRACAUDAL; PERINEURAL at 08:57

## 2022-06-15 RX ADMIN — OXYTOCIN 8 MILLI-UNITS/MIN: 10 INJECTION INTRAVENOUS at 06:09

## 2022-06-15 RX ADMIN — SODIUM CHLORIDE, POTASSIUM CHLORIDE, SODIUM LACTATE AND CALCIUM CHLORIDE 125 ML/HR: 600; 310; 30; 20 INJECTION, SOLUTION INTRAVENOUS at 11:37

## 2022-06-15 RX ADMIN — LIDOCAINE HYDROCHLORIDE 5 ML: 10 INJECTION, SOLUTION INFILTRATION; PERINEURAL at 08:42

## 2022-06-15 RX ADMIN — LIDOCAINE HYDROCHLORIDE AND EPINEPHRINE 3 ML: 15; 5 INJECTION, SOLUTION EPIDURAL at 08:54

## 2022-06-15 RX ADMIN — SODIUM CHLORIDE, POTASSIUM CHLORIDE, SODIUM LACTATE AND CALCIUM CHLORIDE 125 ML/HR: 600; 310; 30; 20 INJECTION, SOLUTION INTRAVENOUS at 07:15

## 2022-06-15 RX ADMIN — SODIUM CHLORIDE, POTASSIUM CHLORIDE, SODIUM LACTATE AND CALCIUM CHLORIDE 125 ML/HR: 600; 310; 30; 20 INJECTION, SOLUTION INTRAVENOUS at 14:04

## 2022-06-15 RX ADMIN — Medication 12 ML/HR: at 15:47

## 2022-06-15 RX ADMIN — BUPIVACAINE HYDROCHLORIDE 3 ML: 5 INJECTION, SOLUTION EPIDURAL; INTRACAUDAL; PERINEURAL at 10:57

## 2022-06-15 NOTE — PROGRESS NOTES
1345: This RN asking Dr. Kecia Torres to revew EFM via perfect serve, MD updated on FHR interventions thus far

## 2022-06-15 NOTE — PROGRESS NOTES
Forceps assisted Vaginal Delivery Note   Mercedes Diggs MD    [facility]             Patient progressed to cervical dilation: C/C/+2  The patient pushed over intact perineum with excellent maternal effort and little descent. Please see Dr. Jerman Mittal note for forceps delivery note. Delivered to +4 and delivery resumed by myself. LBFI, placed on mother's chest    NICU present for delivery with vigorous fetal cry noted. Delayed cord clamping, cord clamped x2 and cut by   partner  Spontaneous placenta delivery. Delivery Physician: Jose Juan Claire MD, FACOG  Attending Attestation: I performed the procedure    Procedure: FAVD    Presentation: Cephalic    Fetal Description: perez    Indications for instrumental delivery: poor descent with 2nd stage     Estimated Blood Loss: 300cc    Information for the patient's : Kieran Gould, Female [place] [[id number]]   Placenta Date/Time: 6/15/2022  6:07 PM   Placenta Removal: Expressed   Placenta Appearance: Normal          Episiotomy: None  Laceration(s): right and left vaginal (not high) laceration repaired in layers with 3-0 vicryl  Estimated Blood Loss (ml): No data found  300 cc    Labor Events  Method: No data found     Augmentation: No data found  Cervical Ripening: No data found No data found No data found     Induction  yes and indication  IUGR, GHTN    Additional Delivery Comments  Forceps assisted     Episiotomy: none    Laceration(s):  Bilateral vaginal     Laceration(s) repair: YES    Counts correct    Placenta Appearance: normal    Pathology specimen: none           Complications:  Protracted 2nd stage with lack of descent, pushing x 3 hours.                Signed By:  Lorie Park MD     Robyn 15, 2022

## 2022-06-15 NOTE — ANESTHESIA PREPROCEDURE EVALUATION
Relevant Problems   NEUROLOGY   (+) Depression   (+) Learning disability      GASTROINTESTINAL   (+) GERD (gastroesophageal reflux disease)       Anesthetic History   No history of anesthetic complications            Review of Systems / Medical History  Patient summary reviewed and pertinent labs reviewed    Pulmonary  Within defined limits                 Neuro/Psych   Within defined limits           Cardiovascular  Within defined limits                     GI/Hepatic/Renal     GERD           Endo/Other  Within defined limits           Other Findings              Physical Exam    Airway  Mallampati: II    Neck ROM: normal range of motion        Cardiovascular               Dental         Pulmonary                 Abdominal         Other Findings            Anesthetic Plan    ASA: 2  Anesthesia type: epidural          Induction: Intravenous  Anesthetic plan and risks discussed with: Patient

## 2022-06-15 NOTE — PROGRESS NOTES
Labor Progress Note    Patient seen, fetal heart rate and contraction pattern evaluated. cx meeks fell out last night, bolusing for epidural.      Physical Exam:  Visit Vitals  BP (!) 153/85 (BP 1 Location: Right arm, BP Patient Position: At rest)   Pulse 91   Temp 98.1 °F (36.7 °C)   Resp 16   Ht 5' 5\" (1.651 m)   Wt 217 lb (98.4 kg)   BMI 36.11 kg/m²       Cervical Exam: Cervical Exam  Membrane Status: Intact   4cm, 80 -3, vtx,   arom clear fluid    Membranes:  Artificial Rupture of Membranes; Amniotic Fluid: medium amount of clear fluid    Recent Results (from the past 24 hour(s))   METABOLIC PANEL, COMPREHENSIVE    Collection Time: 06/14/22  5:22 PM   Result Value Ref Range    Sodium 140 136 - 145 mmol/L    Potassium 4.4 3.5 - 5.1 mmol/L    Chloride 107 97 - 108 mmol/L    CO2 21 21 - 32 mmol/L    Anion gap 12 5 - 15 mmol/L    Glucose 76 65 - 100 mg/dL    BUN 10 6 - 20 MG/DL    Creatinine 0.55 0.55 - 1.02 MG/DL    BUN/Creatinine ratio 18 12 - 20      GFR est AA >60 >60 ml/min/1.73m2    GFR est non-AA >60 >60 ml/min/1.73m2    Calcium 8.7 8.5 - 10.1 MG/DL    Bilirubin, total 0.5 0.2 - 1.0 MG/DL    ALT (SGPT) 24 12 - 78 U/L    AST (SGOT) 23 15 - 37 U/L    Alk.  phosphatase 245 (H) 45 - 117 U/L    Protein, total 6.5 6.4 - 8.2 g/dL    Albumin 2.8 (L) 3.5 - 5.0 g/dL    Globulin 3.7 2.0 - 4.0 g/dL    A-G Ratio 0.8 (L) 1.1 - 2.2     TYPE & SCREEN    Collection Time: 06/14/22  5:22 PM   Result Value Ref Range    Crossmatch Expiration 06/17/2022,2359     ABO/Rh(D) A POSITIVE     Antibody screen NEG    CBC WITH AUTOMATED DIFF    Collection Time: 06/14/22  5:22 PM   Result Value Ref Range    WBC 12.5 (H) 3.6 - 11.0 K/uL    RBC 4.20 3.80 - 5.20 M/uL    HGB 12.4 11.5 - 16.0 g/dL    HCT 37.0 35.0 - 47.0 %    MCV 88.1 80.0 - 99.0 FL    MCH 29.5 26.0 - 34.0 PG    MCHC 33.5 30.0 - 36.5 g/dL    RDW 16.3 (H) 11.5 - 14.5 %    PLATELET 691 373 - 120 K/uL    MPV 11.3 8.9 - 12.9 FL    NRBC 0.0 0  WBC    ABSOLUTE NRBC 0.00 0.00 - 0.01 K/uL    NEUTROPHILS 70 32 - 75 %    LYMPHOCYTES 25 12 - 49 %    MONOCYTES 5 5 - 13 %    EOSINOPHILS 0 0 - 7 %    BASOPHILS 0 0 - 1 %    IMMATURE GRANULOCYTES 0 0.0 - 0.5 %    ABS. NEUTROPHILS 8.7 (H) 1.8 - 8.0 K/UL    ABS. LYMPHOCYTES 3.2 0.8 - 3.5 K/UL    ABS. MONOCYTES 0.6 0.0 - 1.0 K/UL    ABS. EOSINOPHILS 0.1 0.0 - 0.4 K/UL    ABS. BASOPHILS 0.0 0.0 - 0.1 K/UL    ABS. IMM. GRANS. 0.0 0.00 - 0.04 K/UL    DF AUTOMATED     PROTEIN/CREATININE RATIO, URINE    Collection Time: 06/15/22  4:15 AM   Result Value Ref Range    Protein, urine random 65 (H) 0.0 - 11.9 mg/dL    Creatinine, urine 319.00 mg/dL    Protein/Creat. urine Ratio 0.2           Assessment/Plan:  32 y.o.  39w2d  GHTN   Reassuring fetal status. Anticipate   CEFM/TOCO  Epidural prn  Disc plan for IOL    Sarah Beth Marie MD      NST Inpatient Procedure Note    Gilberto Corona presents for fetal non-stress test.    Indication is labor. She is 39w2d. She has been monitored for more than 60 minutes. The FHR was reactive. NST Interpretation:   FHR baseline 120 bpm,   Variability moderate  Accelerations present. Decelerations Absent. Uterine contractions were q 3 minutes     Assessment  NST is reactive. NST is reassuring. Patient does need admission/observation for further monitoring.       Plan:    [x] Continue admission to labor and delivery    [] Continue observation   [] Keep routine OB follow up upon discharge   [] Reviewed fetal movement kick counts, notify MD if decreased   []    []

## 2022-06-15 NOTE — PROGRESS NOTES
Patient with good maternal effort, pushing for 3 hours with little descent, SWETA and some caput. Disc option of CS, continue pushing vs operative delivery. Pt prefers to avoid CS. Consult requested re option of forceps delivery with Dr. Adonis Werner, [facility]. Disc rba of cs vs operative delivery. I discussed the risks of the procedure including bleeding, infection, wound healing problems, blood clots, injury to internal organs including her bladder or bowel. I also discussed alternatives to operative delivery including not having CS, vavd, FA vd  She understands the risks, benefits and alternatives to the procedure; any and all questions were answered to her satisfaction.   She want to proceed with a: MOHINI

## 2022-06-15 NOTE — PROGRESS NOTES
Patient pushing with good effort.   STEVENSON  C/c 0-+1    32 y.o.  39w2d  Pushing    Anticipate  if continues to make progress

## 2022-06-15 NOTE — PROGRESS NOTES
Patient comfortable. Visit Vitals  /78   Pulse 84   Temp 98.1 °F (36.7 °C)   Resp 16   Ht 5' 5\" (1.651 m)   Wt 217 lb (98.4 kg)   SpO2 99%   BMI 36.11 kg/m²     90/7/0/vtx  fse and iupc placed for improved fetal monitoring and measurement of MVU and titration of pitocin. Disc rba of interna monitors all questions answered by patient and family, amenable to plan.    32 y.o.  39w2d  iol ghtn    Anticipate   Titrate pitocin while RFS.     Arnie Carlson MD

## 2022-06-15 NOTE — ANESTHESIA PROCEDURE NOTES
Epidural Block    Patient location during procedure: OB  Start time: 6/15/2022 8:41 AM  End time: 6/15/2022 8:57 AM  Reason for block: labor epidural  Staffing  Performed: attending   Anesthesiologist: Jaertt Lane MD  Preanesthetic Checklist  Completed: patient identified, IV checked, site marked, risks and benefits discussed, surgical consent, monitors and equipment checked, pre-op evaluation and timeout performed  Block Placement  Patient position: sitting  Prep: ChloraPrep  Sterility prep: cap, drape, gloves, hand and mask  Sedation level: no sedation  Patient monitoring: heart rate and continuous pulse oximetry  Approach: midline  Location: lumbar  Lumbar location: L3-L4  Epidural  Loss of resistance technique: saline  Guidance: landmark technique  Needle  Needle type: Tuohy   Needle gauge: 17 G  Needle length: 9 cm  Catheter type: multi-orifice  Catheter size: 19 G  Catheter securement method: clear occlusive dressing, liquid medical adhesive and surgical tape  Test dose: negative  Assessment  Block outcome: pain improved  Procedure assessment: patient tolerated procedure well with no immediate complications

## 2022-06-15 NOTE — ADT AUTH CERT NOTES
Comments  Comment          Patient Demographics    Patient Name   Nissa Marcus   [id number] Legal Sex   Female    1995 Address   [address]   [address] [place] [address] Phone   [phone] [facility])   [phone] (Mobile) *Preferred*   CSN:   [id number]     Admit Date: Admit Time Room Bed   2022  4:29  [[id number]] 01 [[id number]]       Attending Providers    Provider Pager From To   Remy Rousseau MD  22   Gianfranco Grimes MD  22   Remy Rousseau MD  22      Emergency Contact(s)    Name Relation Home Work [address] Boyfriend [phone]   Chris Reynosoner Drop Mother [phone]     H&P Notes       H&P by Gianfranco Grimes MD at 22 documented on Admission (Current) from 2022 in [facility] 2 LABOR & DELIVERY    Author: Gianfranco Grimes MD Author Type: Physician Filed: 22 3630   Note Status: Addendum Cosign: Cosign Not Required Date of Service: 22   : Gianfranco Grimes MD (Physician)       Prior Versions: 1. Gianfranco Grimes MD (Physician) at 22 1715 - Signed      History & Physical     Name: Sha Paredes MRN: [id number]  SSN: [id number]    YOB: 1995  Age: 32 y.o. Sex: female          Subjective:      Estimated Date of Delivery: 22           OB History         1    Para   0    Term   0       0    AB   0    Living   0        SAB   0    IAB   0    Ectopic   0    Molar   0    Multiple   0    Live Births   0                   Ms. Sandra Ram is admitted with pregnancy at 36w3d for induction of labor. Prenatal course was complicated by fetal growth restriction. Her induction is indicated for fetal growth restriction    . Bps also mildly elevated.   No sx pre E.      Group B Strep was negative.          Past Medical History:   Diagnosis Date    Depression       Anxiety    Encounter for Papanicolaou smear for cervical cancer screening 2021   neg/hpv not tested    LANCE (generalized anxiety disorder)      GERD (gastroesophageal reflux disease)      Hearing loss, left      Learning disability       reading    Obesity (BMI 35.0-39.9 without comorbidity)              Past Surgical History:   Procedure Laterality Date    HX OTHER SURGICAL   2019     wisdom teeth      Social History            Occupational History    Not on file   Tobacco Use    Smoking status: Never Smoker    Smokeless tobacco: Never Used   Vaping Use    Vaping Use: Never used   Substance and Sexual Activity    Alcohol use: Never    Drug use: Never    Sexual activity: Yes       Birth control/protection: None            Family History   Problem Relation Age of Onset    No Known Problems Mother      No Known Problems Father      Diabetes Paternal Grandmother      No Known Problems Half-sister      No Known Problems Half-brother      No Known Problems Half-brother                Allergies   Allergen Reactions    Amoxicillin Hives    Grass Pollen Hives and Rash    Kiwi Hives    Penicillins Hives              Prior to Admission medications    Medication Sig Start Date End Date Taking? Authorizing Provider   ascorbic acid, vitamin C, (Vitamin C) 250 mg tablet Take  by mouth.     Yes Provider, Historical   BABY ASPIRIN PO Take  by mouth.     Yes Provider, Historical   ferrous sulfate (Iron) 325 mg (65 mg iron) tablet Take  by mouth Daily (before breakfast).     Yes Provider, Historical   omeprazole (PRILOSEC) 40 mg capsule Take 1 Capsule by mouth daily. 9/23/21   Yes Aimee Gandhi MD   prenatal vit86-iron-folic acid (Nestabs) 05-3,035 mg-mcg tab Take 1 Tablet by mouth daily.  7/27/21   Yes Aimee Gandhi MD         Review of Systems: A comprehensive review of systems was negative except for that written in the HPI.     Objective:      Vitals:         Vitals:     06/14/22 1653 06/14/22 1654 06/14/22 1702 06/14/22 1723   BP: (!) 151/92   (!) 141/86     Pulse: 90   81   Temp:       98 °F (36.7 °C)   Weight:   217 lb (98.4 kg)       Height:   5' 5\" (1.651 m)             Physical Exam:  Patient without distress. Heart: Regular rate and rhythm  Lung: normal respiratory effort  Abdomen: soft, nontender  Fundus: soft and non tender  Cervical Exam: 1 cm dilated    50% effaced    -3 station    Presenting Part: cephalic  Cervical Position: posterior  Consistency: Medium  Lower Extremities:  - Edema trace  Membranes:  Intact  Fetal Heart Rate: Reactive     Prenatal Labs:         Lab Results   Component Value Date/Time     Rubella, External NI 2022 12:00 AM         Assessment/Plan:   Fetal growth restriction   Elevated BP     Plan:  induction of labor             Cook catheter             Misoprostol             Pre E labs          Signed By:  Virgen Samuels MD      2022                     Comments  Comment          To print report, click blue 'Print' hyperlink at right    Report Name Print   Delivery:Mom Chart Print      [address]  [phone]       Patient: Sabine Amaro  MRN: [id number]  :1995          [address]  MRN: [id number]       Link to Baby  Comment        Baby's name MRN Account  Age Sex Admission Type   Luz Asper, Pending Female Debbie Farmer [id number] 22 1 days F Pending Admission     OB History       1    Para   0    Term   0       0    AB   0    Living   0      SAB   0    IAB   0    Ectopic   0    Molar   0    Multiple   0    Live Births   0         # Outcome Date GA Labor/2nd Weight Sex Delivery Anes PTL Lv A1 A5   1 Current                   Prenatal History     Most Recent Value   Did You Receive Prenatal Care Yes   Name Of OB Provider Adela   Seen By MFM (Maternal Fetal Medicine)? No   Fetal Ultrasound Abnormalities/Concerns?  Yes   Infant Feeding Formula   Circumcision Planned No   Pediatrician After Birth/ Follow Up Baby Visits on call     Dating Summary    Working LUKE: 22 based on Alternate LUKE Entry     Based On LUKE GA Diff GA User Date   Last Menstrual Period on 21 (Exact Date) 22 Same  System action - copied 21   Alternate LUKE Entry  0 Comment: Date entered prior to episode creation 22 Working  System action - copied 21     Prenatal Results      Prenatal Labs    Test Value Date Time   ABO/Rh * A positive  22    Antibody Scrn * Positive  22    Hgb      Hct      Platelets      Rubella * NI  22    RPR * nonreactive  22    T.  Pallidum Antibody      Urine      Hep B Surf Ag      Hep C      HIV      Gonorrhea      Chlamydia      TSH      GTT, 1 HR (Glucola)      GTT, Fasting      GTT, 1 HR      GTT, 2 HR      GTT, 3 HR        3rd Trimester    Test Value Date Time   Hgb      Hct      Platelets      Group B Strep * negative  22    Antibody Scrn      TSH      T. Pallidum Antibody      Hep B Surf Ag      Gonorrhea      Chlamydia         Screening/Diagnostics    Test Value Date Time   AFP Only * negative  22    AFP Tetra      Hgb Electrophoresis      Amniocentesis      Cystic Fibrosis      Thalessemia      Ivan-Sachs      Canavan      PAP Smear      Beta HCG      NT      NIPT * low risk girl  22    COVID-19        Lab    Test Value Date Time   GTT, Fasting      GTT, 1HR * 119  22    GTT, 2HR      GTT, 3HR      RPR * nonreactive  22    Beta HCG      CMV Ab      Toxoplasma Ab      Varicella Zoster Ab           Legend    *: Historical  View all results for this pregnancy       [place], Pending Female [facility] [[id number]]      [place] Delivery    Birth date/time:   Delivery type:     Labor Events    Rupture date/time: 6/15/2022 0732  Rupture type: AROM  Fluid color: Clear  Fluid odor: None    Delivery Providers    Delivering clinician:   Provider Role    Obstetrician    Primary Nurse    Primary [place] Nurse    NICU Nurse    Neonatologist    Anesthesiologist    CRNA    Nurse Practitioner    Midwife    Nursery Nurse     Multiple Birth Onset Second Stage    No data filed    Apgars    Living status:   Apgars:  1 min. :  5 min.:  10 min. :  15 min.:  20 min.:    Skin color:         Heart rate:         Reflex irritability:         Muscle tone:         Respiratory effort: Total:           Measurements    Weight:   Length:     Lacerations    No data filed    Placenta    No data filed    Vaginal Counts            Delivery Summary History    The Delivery Summary has not been signed.

## 2022-06-15 NOTE — PROGRESS NOTES
6/15/2022  12:28 PM    CM met with SHUKRI to complete initial assessment and begin discharge planning. MOB verified and confirmed demographics. SHUKRI lives with JAIRO- Tommie Joyce ( [phone]),  at the address on file. SHUKRI noted that her and FOB live with his family. SHUKRI is not employed and currently receives disability benefits, and MOB plans to be home with infant. FOJACKIE is also not employed and will be home with MOB and infant. SHUKRI reports she has good family support, and feels like she has the support she needs when she returns home. SHUKRI plans to bottle feed baby. SHUKRI does not have a pediatrician selected, CM provided her with a list.  SHUKRI has car seat, bassinet/crib, clothing, bottles and all necessary supplies for baby. SHUKRI has [facility] Medicaid, and will be adding baby to this policy. CM discussed process to add baby to insurance, SHUKRI verbalized understanding. SHUKRI is also enrolled in [facility] and SNAP benefits. Care Management Interventions  PCP Verified by CM: Yes ([facility])  Mode of Transport at Discharge:  Other (see comment)  Transition of Care Consult (CM Consult): Discharge Planning  Support Systems: Other Family Member(s),Spouse/Significant Other  Confirm Follow Up Transport: Family  Discharge Location  Patient Expects to be Discharged to[de-identified] Home with family assistance  Vandana Kaye

## 2022-06-15 NOTE — L&D DELIVERY NOTE
Forceps Delivery Procedure Note    Delivery Physician:  Kanika Kwan MD    Indication: Prolonged second stage    Relevant history:  32 y.o.  pushing for three hours without further descent. Fetal rate category: Category 1    Contraindication:None     EGA: 39w2d    EFW: 7.5 pounds    Pelvis: Adequate for vaginal delivery    Cervix: Fully dilated and retracted    Membranes: Ruptured    Amniotic Fluid: Clear    Is the fetal head engaged? Yes    Station: +2    Fetal head position: Right occiput anterior    Asynclitism: Anterior    Caput: +1    [place]    Fetal spine location: Maternal right    Fetal position confirmed with ultrasound: Yes    Location for procedure: Delivery room    Anesthesia: Epidural    Maternal bladder emptied: Yes    Forceps type:Maurilio-Khanh    Forceps classification: Low    Number of pulls: 3    Rotation of head: 0 - 45 degrees    Traction: Moderate    Maternal effort: Moderate    Total time forceps applied to fetal head: 5 minutes    Successful: Yes    Shoulder dystocia: No    Birth:   Information for the patient's : Red David, Female Trinh Cerrato [[id number]]   Delivery of a   female infant on 6/15/2022 at 6:04 PM. Apgars were   and  . Umbilical Cord: 3 Vessels     Umbilical Cord Events: None     Placenta: Expressed removal with Normal appearance. Amniotic Fluid Volume: Moderate     Amniotic Fluid Description:  Clear        Baby gender: Female    Birth Weight: pending    Apgar - One Minute: 9    Apgar - Five Minutes: 9    Umbilical Cord: 3 vessels present    Placenta Removal: expressed    Placenta Appearance: normal intact    Specimens: None           Complications:  none    [place] exam: Forceps nunu    Episiotomy: Episiotomy: None    Lacerations: bilateral vaginal (low) repaired  with 3-0 Vicryl, perineum is intact    EBL: 300 mls    QBL: 300 mls    I applied the forceps and traction. I removed them when the baby started .   Dr. Beryle Boy completed the delivery, delivered the placenta and did the repair.     Signed By:  Devonte Escalante MD     Robyn 15, 2022

## 2022-06-15 NOTE — PROGRESS NOTES
7960 SBAR from 1600 [facility]. Assumed care of patient. Received awake in bed with mother and significant other at bedside. 0725 POC and labor process discussed with patient and family, required education several times. Patient rates contraction pain 6/10 and opting for epidural.    0732 Dr. Jersey Caba to bedside to discuss POC. SVE 4/80/-3 and AROM clear moderate fluid. Smiley care provided. Fluid bolus initiated anticipating epidural.    0800 Writer remains at bedside at this time. Patient and family requires significant education regarding labor process. Patient anxious and requires frequent reassurance, education and redirection. 0840 Sitting up to side of bed for epidural.    0841 Time out for epidural.    0851 Test dose. 3509 Patient states she feel much more comfortable s/p epidural placement. Repositioned right lateral with peanut. Frequent education and reassurance required. 1000 Repositioned left lateral with peanut. Riley Chen 42 c/o breakthrough discomfort to left lower abdomen/side. Repositioned right lateral. Patient pushed epidural button for breakthrough pain. 12 Dr. Vern Avilez redosed epidural. Patient reports pain is much better. 1120 Repositioned left lateral with peanut. IVF bolus due to EFM tracing. 1215  to bedside, SVE 7/90/0. IUPC and FSE placed. Repositioned left lateral with peanut. 1344 This writer in to reposition patient. During reposition, decel noted on EFM tracing. Repositioned back left lateral placed in trendelenburg. Pitocin stopped. IVF bolus initiated. Iwona 70 has reviewed EFM tracing. 1410 Patient awake, drinking apple juice. 026 848 14 90 Patient reports feeling increased vaginal pressure. Writer to bedside. SVE complete, 100 effacement. 1454 Patient actively pushing. RN remains in continuous attendance at the bedside. Assessment & evaluation of fetal heart rate ongoing via continuous EFM.     65 Dr. Jersey Caba to bedside, push cycle with patient. 36 Dr. Shalini Tyler and Dr. Michel Antonio at bedside to discuss POC and SVE. 1 Dr. Michel Antonio performing bedside US. 18 Dr. Michel Antonio and Dr. Shalini Tyler discussing delivery options with patient with risk and benefits. 1756 1st forcept attempt per . 1800 2nd forcept attempt per Dr. Michel Antonio. [place] 62 remained at bedside throughout pushing. EFM continuously assessed. Vaginal delivery of viable infant. 1925 Bedside shift change report given to Kameron Hernandez T.RN (oncoming nurse) by Kathleen Vigil RN (offgoing nurse). Report included the following information SBAR, Kardex, MAR and Recent Results.

## 2022-06-15 NOTE — PROGRESS NOTES
Pt comfortable after epidural, having meeks placed.       Visit Vitals  BP (!) 147/78   Pulse 83   Temp 98.5 °F (36.9 °C)   Resp 16   Ht 5' 5\" (1.651 m)   Wt 217 lb (98.4 kg)   SpO2 99%   BMI 36.11 kg/m²     Will monitor for labor progress    Rupesh Garcia MD

## 2022-06-16 ENCOUNTER — TELEPHONE (OUTPATIENT)
Dept: FAMILY MEDICINE CLINIC | Age: 27
End: 2022-06-16

## 2022-06-16 LAB
ALBUMIN SERPL-MCNC: 2 G/DL (ref 3.5–5)
ALBUMIN/GLOB SERPL: 0.6 {RATIO} (ref 1.1–2.2)
ALP SERPL-CCNC: 188 U/L (ref 45–117)
ALT SERPL-CCNC: 20 U/L (ref 12–78)
ANION GAP SERPL CALC-SCNC: 7 MMOL/L (ref 5–15)
AST SERPL-CCNC: 20 U/L (ref 15–37)
BASOPHILS # BLD: 0 K/UL (ref 0–0.1)
BASOPHILS NFR BLD: 0 % (ref 0–1)
BILIRUB SERPL-MCNC: 0.7 MG/DL (ref 0.2–1)
BUN SERPL-MCNC: 11 MG/DL (ref 6–20)
BUN/CREAT SERPL: 17 (ref 12–20)
CALCIUM SERPL-MCNC: 8.7 MG/DL (ref 8.5–10.1)
CHLORIDE SERPL-SCNC: 111 MMOL/L (ref 97–108)
CO2 SERPL-SCNC: 23 MMOL/L (ref 21–32)
CREAT SERPL-MCNC: 0.65 MG/DL (ref 0.55–1.02)
DIFFERENTIAL METHOD BLD: ABNORMAL
EOSINOPHIL # BLD: 0.1 K/UL (ref 0–0.4)
EOSINOPHIL NFR BLD: 1 % (ref 0–7)
ERYTHROCYTE [DISTWIDTH] IN BLOOD BY AUTOMATED COUNT: 16.7 % (ref 11.5–14.5)
GLOBULIN SER CALC-MCNC: 3.3 G/DL (ref 2–4)
GLUCOSE SERPL-MCNC: 92 MG/DL (ref 65–100)
HCT VFR BLD AUTO: 33.1 % (ref 35–47)
HGB BLD-MCNC: 11.1 G/DL (ref 11.5–16)
IMM GRANULOCYTES # BLD AUTO: 0.1 K/UL (ref 0–0.04)
IMM GRANULOCYTES NFR BLD AUTO: 0 % (ref 0–0.5)
LYMPHOCYTES # BLD: 3.3 K/UL (ref 0.8–3.5)
LYMPHOCYTES NFR BLD: 23 % (ref 12–49)
MCH RBC QN AUTO: 29.8 PG (ref 26–34)
MCHC RBC AUTO-ENTMCNC: 33.5 G/DL (ref 30–36.5)
MCV RBC AUTO: 88.7 FL (ref 80–99)
MONOCYTES # BLD: 1 K/UL (ref 0–1)
MONOCYTES NFR BLD: 7 % (ref 5–13)
NEUTS SEG # BLD: 9.7 K/UL (ref 1.8–8)
NEUTS SEG NFR BLD: 69 % (ref 32–75)
NRBC # BLD: 0 K/UL (ref 0–0.01)
NRBC BLD-RTO: 0 PER 100 WBC
PLATELET # BLD AUTO: 159 K/UL (ref 150–400)
PMV BLD AUTO: 11.4 FL (ref 8.9–12.9)
POTASSIUM SERPL-SCNC: 3.7 MMOL/L (ref 3.5–5.1)
PROT SERPL-MCNC: 5.3 G/DL (ref 6.4–8.2)
RBC # BLD AUTO: 3.73 M/UL (ref 3.8–5.2)
SODIUM SERPL-SCNC: 141 MMOL/L (ref 136–145)
WBC # BLD AUTO: 14.2 K/UL (ref 3.6–11)

## 2022-06-16 PROCEDURE — 74011250637 HC RX REV CODE- 250/637: Performed by: OBSTETRICS & GYNECOLOGY

## 2022-06-16 PROCEDURE — 36415 COLL VENOUS BLD VENIPUNCTURE: CPT

## 2022-06-16 PROCEDURE — 99024 POSTOP FOLLOW-UP VISIT: CPT | Performed by: OBSTETRICS & GYNECOLOGY

## 2022-06-16 PROCEDURE — 65270000029 HC RM PRIVATE

## 2022-06-16 PROCEDURE — 77030021125

## 2022-06-16 PROCEDURE — 85025 COMPLETE CBC W/AUTO DIFF WBC: CPT

## 2022-06-16 PROCEDURE — 80053 COMPREHEN METABOLIC PANEL: CPT

## 2022-06-16 RX ORDER — IBUPROFEN 600 MG/1
600 TABLET ORAL
Qty: 30 TABLET | Refills: 0 | Status: SHIPPED | OUTPATIENT
Start: 2022-06-16 | End: 2022-08-05 | Stop reason: SDUPTHER

## 2022-06-16 RX ADMIN — IBUPROFEN 800 MG: 800 TABLET, FILM COATED ORAL at 14:04

## 2022-06-16 RX ADMIN — IBUPROFEN 800 MG: 800 TABLET, FILM COATED ORAL at 05:23

## 2022-06-16 RX ADMIN — IBUPROFEN 800 MG: 800 TABLET, FILM COATED ORAL at 22:17

## 2022-06-16 NOTE — PROGRESS NOTES
Patient has a need for more education than standard education practices: Reviewed pericare with the patient multiple times, verbal instructions as well as demonstration of how to arrange pad and chux and use rashida bottle, reviewed how to respond to infant crying and a list of things to check to help comfort baby, reviewed normal elimination pattern with parents, instructed mother to urinate more frequently to help keep bleeding under control, reviewed how to burp baby, reviewed safety form with mother and family, reviewed normal bleeding pattern and length and when to call the doctor, discussed the need to pick a pediatrician to take the baby to and gave patient the pediatrician list to explore options

## 2022-06-16 NOTE — TELEPHONE ENCOUNTER
Ms. Ramires Haw called to advise Dr. Marsha Ashby per pt request of admission to [facility] 22, noting pt delivered her baby vaginally after declining , and forceps were used, but mom and baby are fine.  Harjeet

## 2022-06-16 NOTE — DISCHARGE SUMMARY
Obstetrical Discharge Summary     Name: Mary Jiang MRN: [id number]  SSN: [id number]    YOB: 1995  Age: 32 y.o. Sex: female      Admit Date: 2022    Discharge Date: 2022      Admitting Physician: Martha Rodas MD     Attending Physician:  Venkat Walton MD     Admission Diagnoses: Encounter for induction of labor [Z34.90]    Condition on Discharge: Stable    Procedures: FAVD    Disposition: to home    Follow up: No orders of the defined types were placed in this encounter. Discharge Diagnoses:   Information for the patient's : Darren Michael, Female Barby Fend [[id number]]   Delivery of a 6 lb 8.8 oz (2.97 kg) female infant via Vaginal, Forceps on 6/15/2022 at 6:04 PM  by Greyson Madden. Apgars were 9  and 9 . Additional Diagnoses:   Hospital Problems  Date Reviewed: 2022          Codes Class Noted POA    * (Principal) Encounter for induction of labor ICD-10-CM: Z34.90  ICD-9-CM: V22.1  2022 Unknown        39 weeks gestation of pregnancy ICD-10-CM: Z3A.39  ICD-9-CM: V22.2  2022 Unknown        Pregnancy affected by fetal growth restriction ICD-10-CM: O36.5990  ICD-9-CM: 656.50  2022 Yes             Lab Results   Component Value Date/Time    Rubella, External NI 2022 12:00 AM    GrBStrep, External negative 2022 12:00 AM       Hospital Course: Normal hospital course following the delivery. Patient Instructions:   Current Discharge Medication List      CONTINUE these medications which have NOT CHANGED    Details   ascorbic acid, vitamin C, (Vitamin C) 250 mg tablet Take  by mouth. BABY ASPIRIN PO Take  by mouth. ferrous sulfate (Iron) 325 mg (65 mg iron) tablet Take  by mouth Daily (before breakfast). omeprazole (PRILOSEC) 40 mg capsule Take 1 Capsule by mouth daily.   Qty: 90 Capsule, Refills: 3    Associated Diagnoses: Gastroesophageal reflux disease, unspecified whether esophagitis present      prenatal vit86-iron-folic acid (Nestabs) 32-1,000 mg-mcg tab Take 1 Tablet by mouth daily. Qty: 90 Tablet, Refills: PRN    Associated Diagnoses: Pre-conception counseling             Reference my discharge instructions.       Signed By:  Kandi Perales MD     June 16, 2022

## 2022-06-16 NOTE — ROUTINE PROCESS
Bedside and verbal shift change report given to oncoming nurse, as assigned, by offgoing nurse, Taqueria Terry RN. Report included SBAR, Kardex, I&Os, Recent Results, Procedures, MAR, and changes in patient status. Oncoming nurse and patient given opportunity for questions.

## 2022-06-17 VITALS
SYSTOLIC BLOOD PRESSURE: 137 MMHG | OXYGEN SATURATION: 99 % | TEMPERATURE: 97.6 F | HEIGHT: 65 IN | HEART RATE: 69 BPM | WEIGHT: 217 LBS | BODY MASS INDEX: 36.15 KG/M2 | RESPIRATION RATE: 16 BRPM | DIASTOLIC BLOOD PRESSURE: 95 MMHG

## 2022-06-17 PROCEDURE — 99024 POSTOP FOLLOW-UP VISIT: CPT | Performed by: OBSTETRICS & GYNECOLOGY

## 2022-06-17 PROCEDURE — 74011250637 HC RX REV CODE- 250/637: Performed by: OBSTETRICS & GYNECOLOGY

## 2022-06-17 RX ADMIN — IBUPROFEN 800 MG: 800 TABLET, FILM COATED ORAL at 06:16

## 2022-06-17 NOTE — ROUTINE PROCESS
Bedside and Verbal shift change report given to MELODIE Smith RN (oncoming nurse). Report included the following information SBAR, Intake/Output, MAR and Recent Results.

## 2022-06-17 NOTE — PROGRESS NOTES
Late entry from [facility] on  pm    Post-Partum Progress Note    Patient doing well post-partum without significant complaint. She is voiding without difficulty, she reports normal lochia. Her pain is well controlled with oral pain medication. She is tolerating a general diet. No HA/CP/SOB/vision change/ruq pain. Delivery information:  Information for the patient's : Afton Dakin, Female [employer] [[id number]]   Delivery of a 6 lb 8.8 oz (2.97 kg) female infant via Vaginal, Forceps on 6/15/2022 at 6:04 PM  by Jenny Naik. Apgars were 9  and 9 . Vitals:    Patient Vitals for the past 8 hrs:   BP Temp Pulse Resp SpO2   22 0608 138/82 97.9 °F (36.6 °C) 69 16 100 %   22 0418 134/86 97.9 °F (36.6 °C) 73 16 98 %     Temp (24hrs), Av.9 °F (36.6 °C), Min:97.7 °F (36.5 °C), Max:98.1 °F (36.7 °C)    Visit Vitals  /82 (BP 1 Location: Right upper arm, BP Patient Position: At rest)   Pulse 69   Temp 97.9 °F (36.6 °C)   Resp 16   Ht 5' 5\" (1.651 m)   Wt 217 lb (98.4 kg)   SpO2 100%   Breastfeeding Unknown   BMI 36.11 kg/m²       Exam:    General: Patient without distress. Abdomen: soft, fundus firm at level of umbilicus, nontender  Lower extremities: negative for cords or tenderness. Labs: No results found for this or any previous visit (from the past 24 hour(s)). Assessment:    1. Postpartum S/P forceps assisted vaginal delivery   2. Patient doing well without significant complications    Plan:  1. Continue routine postpartum care  2. Routine perineal care and maternal education   3.  Oral pain medications and bowel regimen as needed           Jacqui Sigala MD

## 2022-06-17 NOTE — PROGRESS NOTES
Patient signed hard copy of discharge instructions due to electronic signature not working. Patient off unit in stable condition via wheelchair with volunteers for discharge per Dr. Katrina Schmidt. Patient is aware to follow up in 6 weeks. Prescriptions sent to pharmacy. Patient denies headache, NV, dizziness or pain at this time. Infant in carseat and discharged with mother.

## 2022-06-17 NOTE — PROGRESS NOTES
Post-Partum Progress Note    Patient doing well post-partum without significant complaint. She is voiding without difficulty, she reports normal lochia. Her pain is well controlled with oral pain medication. She is tolerating a general diet. No HA/CP/SOB vision change/RUQ pain    Delivery information:  Information for the patient's : Bere Moran, Female Altagracia Borja [[id number]]   Delivery of a 6 lb 8.8 oz (2.97 kg) female infant via Vaginal, Forceps on 6/15/2022 at 6:04 PM  by [facility]. Apgars were 9  and 9 . Vitals:    Patient Vitals for the past 8 hrs:   BP Temp Pulse Resp SpO2   22 0608 138/82 97.9 °F (36.6 °C) 69 16 100 %   22 0418 134/86 97.9 °F (36.6 °C) 73 16 98 %     Temp (24hrs), Av.9 °F (36.6 °C), Min:97.7 °F (36.5 °C), Max:98.1 °F (36.7 °C)    Visit Vitals  /82 (BP 1 Location: Right upper arm, BP Patient Position: At rest)   Pulse 69   Temp 97.9 °F (36.6 °C)   Resp 16   Ht 5' 5\" (1.651 m)   Wt 217 lb (98.4 kg)   SpO2 100%   Breastfeeding Unknown   BMI 36.11 kg/m²     Patient Vitals for the past 24 hrs:   Temp Pulse Resp BP SpO2   22 0608 97.9 °F (36.6 °C) 69 16 138/82 100 %   22 0418 97.9 °F (36.6 °C) 73 16 134/86 98 %   22 0003 97.8 °F (36.6 °C) 74 16 134/83 99 %   22 1913 97.9 °F (36.6 °C) 71 16 115/77 99 %   22 1531 98.1 °F (36.7 °C) 70 16 127/82 99 %   22 1050 97.7 °F (36.5 °C) 77 16 132/84 99 %       Exam:    General: Patient without distress. Abdomen: soft, fundus firm at level of umbilicus, nontender  Lower extremities: negative for cords or tenderness. Labs: No results found for this or any previous visit (from the past 24 hour(s)). Assessment:    1. Postpartum S/P FA vaginal delivery PPD 2  2. Patient doing well without significant complications    Plan:  1. Continue routine postpartum care  2. Routine perineal care and maternal education   3. Oral pain medications and bowel regimen as needed       4.  Discharge planning    Jeremy Burnett MD

## 2022-06-19 ENCOUNTER — HOSPITAL ENCOUNTER (EMERGENCY)
Age: 27
Discharge: HOME | End: 2022-06-19
Attending: EMERGENCY MEDICINE | Admitting: EMERGENCY MEDICINE
Payer: MEDICAID

## 2022-06-19 VITALS
DIASTOLIC BLOOD PRESSURE: 91 MMHG | OXYGEN SATURATION: 100 % | SYSTOLIC BLOOD PRESSURE: 151 MMHG | HEART RATE: 88 BPM | RESPIRATION RATE: 16 BRPM | HEIGHT: 65 IN | BODY MASS INDEX: 36.14 KG/M2 | TEMPERATURE: 98 F | WEIGHT: 216.93 LBS

## 2022-06-19 LAB
BASOPHILS # BLD: 0 K/UL (ref 0–0.1)
BASOPHILS NFR BLD: 0 % (ref 0–1)
COMMENT, HOLDF: NORMAL
DIFFERENTIAL METHOD BLD: ABNORMAL
EOSINOPHIL # BLD: 0.3 K/UL (ref 0–0.4)
EOSINOPHIL NFR BLD: 2 % (ref 0–7)
ERYTHROCYTE [DISTWIDTH] IN BLOOD BY AUTOMATED COUNT: 16.7 % (ref 11.5–14.5)
HCT VFR BLD AUTO: 37.1 % (ref 35–47)
HGB BLD-MCNC: 12 G/DL (ref 11.5–16)
IMM GRANULOCYTES # BLD AUTO: 0.1 K/UL (ref 0–0.04)
IMM GRANULOCYTES NFR BLD AUTO: 0 % (ref 0–0.5)
LYMPHOCYTES # BLD: 2.1 K/UL (ref 0.8–3.5)
LYMPHOCYTES NFR BLD: 18 % (ref 12–49)
MCH RBC QN AUTO: 29.3 PG (ref 26–34)
MCHC RBC AUTO-ENTMCNC: 32.3 G/DL (ref 30–36.5)
MCV RBC AUTO: 90.7 FL (ref 80–99)
MONOCYTES # BLD: 0.5 K/UL (ref 0–1)
MONOCYTES NFR BLD: 4 % (ref 5–13)
NEUTS SEG # BLD: 9.1 K/UL (ref 1.8–8)
NEUTS SEG NFR BLD: 76 % (ref 32–75)
NRBC # BLD: 0 K/UL (ref 0–0.01)
NRBC BLD-RTO: 0 PER 100 WBC
PLATELET # BLD AUTO: 208 K/UL (ref 150–400)
PMV BLD AUTO: 10.5 FL (ref 8.9–12.9)
RBC # BLD AUTO: 4.09 M/UL (ref 3.8–5.2)
SAMPLES BEING HELD,HOLD: NORMAL
WBC # BLD AUTO: 12.1 K/UL (ref 3.6–11)

## 2022-06-19 PROCEDURE — 36415 COLL VENOUS BLD VENIPUNCTURE: CPT

## 2022-06-19 PROCEDURE — 99283 EMERGENCY DEPT VISIT LOW MDM: CPT

## 2022-06-19 PROCEDURE — 85025 COMPLETE CBC W/AUTO DIFF WBC: CPT

## 2022-06-19 NOTE — ED PROVIDER NOTES
EMERGENCY DEPARTMENT HISTORY AND PHYSICAL EXAM      Date: 6/19/2022  Patient Name: Chase Agarwal    History of Presenting Illness     Chief Complaint   Patient presents with    Post-Partum Complications     from home by rescue a little bleeding on the pad she showed the baby father and he thought she should come to ED; 4 days post partum- concerned about the vaginal bleeding she is seeing. pt has anxiety and has jumps       History Provided By: Patient and EMS    HPI: Chase Agarwal, 32 y.o. female presents to the ED with cc vaginal bleeding. Patient is a G1, P1, 4 days postpartum. She says she had a vaginal delivery, forceps were required. She did require some sutures after delivery. She denies abdominal pain, but states she passed 1 blood clot today. She denies lightheadedness, dizziness, heavy lifting, intercourse, fever, cough, chest pain or shortness of breath. She is not on a blood thinner. There are no other complaints, changes, or physical findings at this time. PCP: Tomas Cannon MD    No current facility-administered medications on file prior to encounter. Current Outpatient Medications on File Prior to Encounter   Medication Sig Dispense Refill    ibuprofen (MOTRIN) 600 mg tablet Take 1 Tablet by mouth every six (6) hours as needed for Pain. Take with food. 30 Tablet 0    ascorbic acid, vitamin C, (Vitamin C) 250 mg tablet Take  by mouth.  ferrous sulfate (Iron) 325 mg (65 mg iron) tablet Take  by mouth Daily (before breakfast).  omeprazole (PRILOSEC) 40 mg capsule Take 1 Capsule by mouth daily. 90 Capsule 3    prenatal vit86-iron-folic acid (Nestabs) 07-1,786 mg-mcg tab Take 1 Tablet by mouth daily.  90 Tablet PRN       Past History     Past Medical History:  Past Medical History:   Diagnosis Date    Depression     Anxiety    Encounter for Papanicolaou smear for cervical cancer screening 12/13/2021    neg/hpv not tested    LANCE (generalized anxiety disorder)     GERD (gastroesophageal reflux disease)     Hearing loss, left     Learning disability     reading    Obesity (BMI 35.0-39.9 without comorbidity)        Past Surgical History:  Past Surgical History:   Procedure Laterality Date    HX OTHER SURGICAL  2019    wisdom teeth       Family History:  Family History   Problem Relation Age of Onset    No Known Problems Mother     No Known Problems Father     Diabetes Paternal Grandmother     No Known Problems Half-sister     No Known Problems Half-brother     No Known Problems Half-brother        Social History:  Social History     Tobacco Use    Smoking status: Never Smoker    Smokeless tobacco: Never Used   Vaping Use    Vaping Use: Never used   Substance Use Topics    Alcohol use: Never    Drug use: Never       Allergies: Allergies   Allergen Reactions    Amoxicillin Hives    Grass Pollen Hives and Rash    Kiwi Hives    Penicillins Hives         Review of Systems   Review of Systems   Constitutional: Negative for fever. HENT: Negative for congestion. Eyes: Negative. Respiratory: Negative for cough. Cardiovascular: Negative for chest pain. Gastrointestinal: Negative for abdominal pain. Endocrine: Negative for heat intolerance. Genitourinary: Positive for vaginal bleeding. Musculoskeletal: Negative for back pain. Skin: Negative for rash. Allergic/Immunologic: Negative for immunocompromised state. Neurological: Negative for dizziness. Hematological: Does not bruise/bleed easily. Psychiatric/Behavioral: Negative. All other systems reviewed and are negative. Physical Exam   Physical Exam  Vitals and nursing note reviewed. Constitutional:       General: She is not in acute distress. Appearance: She is well-developed. HENT:      Head: Normocephalic. Cardiovascular:      Rate and Rhythm: Normal rate and regular rhythm. Pulses: Normal pulses. Heart sounds: Normal heart sounds.    Pulmonary:      Effort: Pulmonary effort is normal.      Breath sounds: Normal breath sounds. Abdominal:      General: Bowel sounds are normal.      Palpations: Abdomen is soft. Tenderness: There is no abdominal tenderness. Musculoskeletal:         General: Normal range of motion. Cervical back: Normal range of motion and neck supple. Skin:     General: Skin is warm and dry. Neurological:      General: No focal deficit present. Mental Status: She is alert. Psychiatric:         Mood and Affect: Mood normal.         Behavior: Behavior normal.         Diagnostic Study Results     Labs -     Recent Results (from the past 12 hour(s))   CBC WITH AUTOMATED DIFF    Collection Time: 06/19/22  5:42 PM   Result Value Ref Range    WBC 12.1 (H) 3.6 - 11.0 K/uL    RBC 4.09 3.80 - 5.20 M/uL    HGB 12.0 11.5 - 16.0 g/dL    HCT 37.1 35.0 - 47.0 %    MCV 90.7 80.0 - 99.0 FL    MCH 29.3 26.0 - 34.0 PG    MCHC 32.3 30.0 - 36.5 g/dL    RDW 16.7 (H) 11.5 - 14.5 %    PLATELET 574 783 - 610 K/uL    MPV 10.5 8.9 - 12.9 FL    NRBC 0.0 0  WBC    ABSOLUTE NRBC 0.00 0.00 - 0.01 K/uL    NEUTROPHILS 76 (H) 32 - 75 %    LYMPHOCYTES 18 12 - 49 %    MONOCYTES 4 (L) 5 - 13 %    EOSINOPHILS 2 0 - 7 %    BASOPHILS 0 0 - 1 %    IMMATURE GRANULOCYTES 0 0.0 - 0.5 %    ABS. NEUTROPHILS 9.1 (H) 1.8 - 8.0 K/UL    ABS. LYMPHOCYTES 2.1 0.8 - 3.5 K/UL    ABS. MONOCYTES 0.5 0.0 - 1.0 K/UL    ABS. EOSINOPHILS 0.3 0.0 - 0.4 K/UL    ABS. BASOPHILS 0.0 0.0 - 0.1 K/UL    ABS. IMM. GRANS. 0.1 (H) 0.00 - 0.04 K/UL    DF AUTOMATED     SAMPLES BEING HELD    Collection Time: 06/19/22  5:42 PM   Result Value Ref Range    SAMPLES BEING HELD 1pst     COMMENT        Add-on orders for these samples will be processed based on acceptable specimen integrity and analyte stability, which may vary by analyte.        Radiologic Studies -   No orders to display     CT Results  (Last 48 hours)    None        CXR Results  (Last 48 hours)    None          Medical Decision Making I am the first provider for this patient. I reviewed the vital signs, available nursing notes, past medical history, past surgical history, family history and social history. Vital Signs-Reviewed the patient's vital signs. Patient Vitals for the past 12 hrs:   Temp Pulse Resp BP SpO2   06/19/22 1527 98 °F (36.7 °C) 88 16 (!) 151/91 100 %       Records Reviewed: Nursing Notes, Old Medical Records and Previous Laboratory Studies    Provider Notes (Medical Decision Making): Abnormal vaginal bleeding    ED Course:   Initial assessment performed. The patients presenting problems have been discussed, and they are in agreement with the care plan formulated and outlined with them. I have encouraged them to ask questions as they arise throughout their visit. Progress note: The patient is feeling better. Her results were reviewed. He is advised to follow-up and return to ER if worse             Critical Care Time:   0    Disposition:  home    DISCHARGE PLAN:  1. Discharge Medication List as of 6/19/2022  6:28 PM        2. Follow-up Information     Follow up With Specialties Details Why Contact Info    Antonina Ron MD Family Medicine  As needed [facility] [address] [place] [address]  [phone]        In 1 day  Call your [facility] doctor    [facility] EMERGENCY DEPT Emergency Medicine  If symptoms worsen [address]  [address] [facility]  [phone]        3. Return to ED if worse     Diagnosis     Clinical Impression:   1. Abnormal uterine bleeding, postpartum        Attestations:    Jamarcus Velez MD    Please note that this dictation was completed with [employer], the computer voice recognition software. Quite often unanticipated grammatical, syntax, homophones, and other interpretive errors are inadvertently transcribed by the computer software. Please disregard these errors. Please excuse any errors that have escaped final proofreading. Thank you.

## 2022-06-19 NOTE — ED NOTES
1843Rony Andres MD has reviewed discharge instructions with the patient. The patient verbalized understanding. Patient ambulatory out of ED at this time.

## 2022-06-20 ENCOUNTER — TELEPHONE (OUTPATIENT)
Dept: OBGYN CLINIC | Age: 27
End: 2022-06-20

## 2022-07-14 RX ORDER — MEDROXYPROGESTERONE ACETATE 150 MG/ML
150 INJECTION, SUSPENSION INTRAMUSCULAR ONCE
Qty: 1 ML | Refills: 0 | Status: SHIPPED | OUTPATIENT
Start: 2022-07-14 | End: 2022-07-14

## 2022-07-22 ENCOUNTER — OFFICE VISIT (OUTPATIENT)
Dept: OBGYN CLINIC | Age: 27
End: 2022-07-22
Payer: MEDICAID

## 2022-07-22 VITALS
HEART RATE: 87 BPM | DIASTOLIC BLOOD PRESSURE: 81 MMHG | SYSTOLIC BLOOD PRESSURE: 128 MMHG | WEIGHT: 200.8 LBS | BODY MASS INDEX: 33.41 KG/M2

## 2022-07-22 DIAGNOSIS — Z30.42 ENCOUNTER FOR DEPO-PROVERA CONTRACEPTION: ICD-10-CM

## 2022-07-22 PROBLEM — Z34.00 PRENATAL CARE OF PRIMIGRAVIDA, ANTEPARTUM: Status: RESOLVED | Noted: 2022-02-15 | Resolved: 2022-07-22

## 2022-07-22 PROBLEM — Z3A.39 39 WEEKS GESTATION OF PREGNANCY: Status: RESOLVED | Noted: 2022-06-14 | Resolved: 2022-07-22

## 2022-07-22 PROBLEM — O36.5990 PREGNANCY AFFECTED BY FETAL GROWTH RESTRICTION: Status: RESOLVED | Noted: 2022-06-14 | Resolved: 2022-07-22

## 2022-07-22 LAB
HCG URINE, QL. (POC): NEGATIVE
VALID INTERNAL CONTROL?: YES

## 2022-07-22 PROCEDURE — 96372 THER/PROPH/DIAG INJ SC/IM: CPT | Performed by: OBSTETRICS & GYNECOLOGY

## 2022-07-22 PROCEDURE — 81025 URINE PREGNANCY TEST: CPT | Performed by: OBSTETRICS & GYNECOLOGY

## 2022-07-22 PROCEDURE — 0503F POSTPARTUM CARE VISIT: CPT | Performed by: OBSTETRICS & GYNECOLOGY

## 2022-07-22 RX ORDER — FLUOXETINE 10 MG/1
20 CAPSULE ORAL DAILY
Qty: 30 CAPSULE | Refills: 0 | Status: SHIPPED | OUTPATIENT
Start: 2022-07-22 | End: 2022-08-05 | Stop reason: SDUPTHER

## 2022-07-22 RX ORDER — MEDROXYPROGESTERONE ACETATE 150 MG/ML
150 INJECTION, SUSPENSION INTRAMUSCULAR ONCE
Status: COMPLETED | OUTPATIENT
Start: 2022-07-22 | End: 2022-07-22

## 2022-07-22 RX ADMIN — MEDROXYPROGESTERONE ACETATE 150 MG: 150 INJECTION, SUSPENSION INTRAMUSCULAR at 13:40

## 2022-07-22 NOTE — PROGRESS NOTES
Cornell Pearson MD, FACOG     Postpartum visit    Chief Complaint   Patient presents with    Post-Partum Complications       Sarai Cobb is a 32 y.o. female  who presents for a postpartum exam.     She is now 5w2d weeks from a vaginal delivery delivery. Patient's last menstrual period was 2021 (exact date). She has had the following significant problems since her delivery: none. She reports her mood as Poor. The patient is not breastfeeding/pumping breast milk for her baby. The patient would like to use depo for birth control. She is due for her next AE in 3 months. She has a ho anxiety and depression she was on meds with PCP , but none now. Zoloft: did not tolerate  Thinks she was on prozac      Past Medical History:   Diagnosis Date    Depression     Anxiety    Encounter for Papanicolaou smear for cervical cancer screening 2021    neg/hpv not tested    LANCE (generalized anxiety disorder)     GERD (gastroesophageal reflux disease)     Hearing loss, left     Learning disability     reading    Obesity (BMI 35.0-39.9 without comorbidity)      Past Surgical History:   Procedure Laterality Date    HX OTHER SURGICAL  2019    wisdom teeth     Current Outpatient Medications   Medication Sig    omeprazole (PRILOSEC) 40 mg capsule Take 1 Capsule by mouth daily. ibuprofen (MOTRIN) 600 mg tablet Take 1 Tablet by mouth every six (6) hours as needed for Pain. Take with food. (Patient not taking: Reported on 2022)    ascorbic acid, vitamin C, (VITAMIN C) 250 mg tablet Take  by mouth. (Patient not taking: Reported on 2022)    ferrous sulfate 325 mg (65 mg iron) tablet Take  by mouth Daily (before breakfast). (Patient not taking: Reported on 2022)    prenatal vit86-iron-folic acid (Nestabs) 28-8,255 mg-mcg tab Take 1 Tablet by mouth daily. (Patient not taking: Reported on 2022)     No current facility-administered medications for this visit.      Allergies Allergen Reactions    Amoxicillin Hives    Grass Pollen Hives and Rash    Kiwi Hives    Penicillins Hives     Family History   Problem Relation Age of Onset    No Known Problems Mother     No Known Problems Father     Diabetes Paternal Grandmother     No Known Problems Half-sister     No Known Problems Half-brother     No Known Problems Half-brother      Social History     Socioeconomic History    Marital status: SINGLE     Spouse name: Not on file    Number of children: Not on file    Years of education: Not on file    Highest education level: Not on file   Occupational History    Not on file   Tobacco Use    Smoking status: Never    Smokeless tobacco: Never   Vaping Use    Vaping Use: Never used   Substance and Sexual Activity    Alcohol use: Never    Drug use: Never    Sexual activity: Yes     Birth control/protection: None   Other Topics Concern     Service Not Asked    Blood Transfusions Not Asked    Caffeine Concern Not Asked    Occupational Exposure Not Asked    Hobby Hazards Not Asked    Sleep Concern Not Asked    Stress Concern Not Asked    Weight Concern Not Asked    Special Diet Not Asked    Back Care Not Asked    Exercise Not Asked    Bike Helmet Not Asked    Seat Belt Not Asked    Self-Exams Not Asked   Social History Narrative    Not on file     Social Determinants of Health     Financial Resource Strain: Not on file   Food Insecurity: Not on file   Transportation Needs: Not on file   Physical Activity: Not on file   Stress: Not on file   Social Connections: Not on file   Intimate Partner Violence: Not on file   Housing Stability: Not on file     OB History          1    Para   1    Term   1       0    AB   0    Living   1         SAB   0    IAB   0    Ectopic   0    Molar   0    Multiple   0    Live Births   1                Immunization History   Administered Date(s) Administered    DTP-Hib 1996    HPV 2020    MMR 2000    Tdap 2007, 2022 Varicella Virus Vaccine 08/15/1996       Review of Systems:  History obtained from the patient  General ROS: negative for - chills, fever or weight loss  Respiratory ROS: no cough, shortness of breath, or wheezing  Cardiovascular ROS: no chest pain or dyspnea on exertion  Gastrointestinal ROS: negative for - appetite loss, change in bowel habits or nausea/vomiting  Genito-Urinary ROS: negative except for as noted in HPI    PHYSICAL EXAMINATION  Visit Vitals  /81   Pulse 87   Wt 200 lb 12.8 oz (91.1 kg)   BMI 33.41 kg/m²       Constitutional  Appearance: well-nourished, well developed, alert, in no acute distress    HENT  Head and Face: appears normal    Neck  Inspection/Palpation: normal appearance, no masses or tenderness  Lymph Nodes: no lymphadenopathy present  Thyroid: gland size normal, nontender, no nodules or masses present on palpation    Chest  clear to auscultation, no wheezes, rales or rhonchi, symmetric air entry. Cardiac/CVS  normal rate, regular rhythm, normal S1, S2, no murmurs, rubs, clicks or gallops.     Breasts  Inspection of Breasts: breasts symmetrical, no skin changes, no discharge present, nipple appearance normal, no skin retraction present  Palpation of Breasts and Axillae: no masses present on palpation, no breast tenderness  Axillary Lymph Nodes: no lymphadenopathy present    Gastrointestinal  Abdominal Examination: abdomen non-tender to palpation, normal bowel sounds, no masses present  Liver and spleen: no hepatomegaly present, spleen not palpable  Hernias: no hernias identified    Genitourinary  External Genitalia: normal appearance for age, no discharge present, no tenderness present, no inflammatory lesions present, no masses present, no atrophy present  Vagina: normal vaginal vault without central or paravaginal defects, no discharge present, no inflammatory lesions present, no masses present  Bladder: non-tender to palpation  Urethra: appears normal  Cervix: normal   Uterus: normal size, shape and consistency  Adnexa: no adnexal tenderness present, no adnexal masses present  Perineum: perineum within normal limits, no evidence of trauma, no rashes or skin lesions present  Anus: anus within normal limits  Inguinal Lymph Nodes: no lymphadenopathy present    Skin  General Inspection: no rash, no lesions identified    Neurologic/Psychiatric  Mental Status:  Orientation: grossly oriented to person, place and time  Mood and Affect: mood normal, affect appropriate    Assessment:  Normal postpartum check  Encounter Diagnosis   Name Primary? Postpartum exam Yes       Plan:  RTO for AE or sooner prn  Discussed contraception options; r/b/a. Planned contraception: depo provera  Use back up x 2 wks   Mood fu 1-2 wks: can do virtual  Rec pp support group  Mood precautions  SI/HI precautions: call [phone] prn  Disc rba of ssri: pt wants to start prozac  She should return to normal activity  We recommend healthy balanced diet, regular exercise  We discussed safer sex practices, condom use and risk factors for sexually transmitted diseases.    Patient should notify MD if she cannot resume normal activity and exercise  Recommended continuing prenatal vitamins/folic acid  Support resources given

## 2022-07-29 ENCOUNTER — TELEPHONE (OUTPATIENT)
Dept: OBGYN CLINIC | Age: 27
End: 2022-07-29

## 2022-07-29 NOTE — TELEPHONE ENCOUNTER
2 pt identifiers confirmed. Patient reporting mild/moderate abdominal cramping, currently rated at a 4/10. Patient recently started depo-provera. Educated patient on common mild/moderate abdominal pain/cramping and breakthrough/irregular bleeding that can occur several weeks/months after starting a new hormone therapy. Denies current bleeding. Gave bleeding/pain ER precautions. Patient stated she had recent unprotected sex on 7/21/22, called clinic and spoke to on-call MD last weekend regarding abdominal pain, and stated on-call MD told her to wait 1 month and take an HPT. Patient asking what she can take for pain. Informed patient can take up to 1000mg Tylenol, not to exceed 4000mg in 24 hours along with heat therapy. Patient verbalized understanding.

## 2022-08-01 NOTE — TELEPHONE ENCOUNTER
32year old patient got her first dep injection on 7/22/2022 and is calling to say that she is having vaginal spotting and is wondering if that is ok    Patient reports the cramping has gotten better. Patient advised of need for her body to adjust to the new medication and that spotting at this time in not unusual    Patient advised to discuss her symptoms at her virtual visit with Md on 8/5/2022    Patient was advised she can check a upt test    Patient verbalized understanding.

## 2022-08-02 NOTE — TELEPHONE ENCOUNTER
Pt called  and name verified . Pt states she is still spotting advised that it is normal with depo shot. Asked nurse when she will stop spotting a pt also states she just stopped breastfeeding . advised pt not sure all bodies are different pt advised she has an appointment on 2022 with Dr. Ankita Mcdaniel she can ask all her questions .

## 2022-08-03 NOTE — TELEPHONE ENCOUNTER
Kanika Rivera MD  to [facility] Shown    7:58 PM  AUB on depo usually improves by second injection. 4-6 Months. She can also try Ibuprofen per protocol to see if that helps w spotting AUB. Notify MD if NI in bleeding. Rec check UPT if indicated. TRP     Patient advised of Md recommendations and continues with  same questions    Patient was provided instruction for virtual visit on 8/5/2022 ,    Patient verbalized understanding.

## 2022-08-05 ENCOUNTER — VIRTUAL VISIT (OUTPATIENT)
Dept: OBGYN CLINIC | Age: 27
End: 2022-08-05
Payer: MEDICAID

## 2022-08-05 ENCOUNTER — TELEPHONE (OUTPATIENT)
Dept: OBGYN CLINIC | Age: 27
End: 2022-08-05

## 2022-08-05 DIAGNOSIS — N93.9 ABNORMAL UTERINE BLEEDING (AUB): ICD-10-CM

## 2022-08-05 DIAGNOSIS — R10.9 ABDOMINAL CRAMPS: ICD-10-CM

## 2022-08-05 PROCEDURE — 99212 OFFICE O/P EST SF 10 MIN: CPT | Performed by: OBSTETRICS & GYNECOLOGY

## 2022-08-05 RX ORDER — IBUPROFEN 600 MG/1
600 TABLET ORAL
Qty: 30 TABLET | Refills: 0 | Status: SHIPPED | OUTPATIENT
Start: 2022-08-05 | End: 2022-08-17 | Stop reason: SDUPTHER

## 2022-08-05 RX ORDER — FLUOXETINE HYDROCHLORIDE 40 MG/1
40 CAPSULE ORAL DAILY
Qty: 90 CAPSULE | Refills: 0 | Status: SHIPPED | OUTPATIENT
Start: 2022-08-05 | End: 2022-09-04

## 2022-08-05 NOTE — TELEPHONE ENCOUNTER
Patient phoned stating pharmacy still does have script for Prozac. Phoned pharmacy ([facility] [phone])[id number] stated she was working on the script. Koeme[employer] message sent to patient.

## 2022-08-05 NOTE — TELEPHONE ENCOUNTER
32year old patient had virtural visit today and is calling to check if her new medication has been sent by the pharmacy    Patient was advised that medication was sent at 8:38 am and to check with the pharmacy about when it will be ready for     Patient verbalized understanding.

## 2022-08-05 NOTE — PROGRESS NOTES
[facility] Video visit    Lupe Waldrop 32 y.o. female who was seen by synchronous (real-time) audio-video technology on 2022. We discussed the expected course, resolution and complications of the diagnosis(es) in detail. Medication risks, benefits, costs, interactions, and alternatives were discussed as indicated. I advised her to contact the office if her condition worsens, changes or fails to improve as anticipated. She expressed understanding with the diagnosis(es) and plan. Pursuant to the emergency declaration under the [facility], [facility] waiver authority and the [employer] and [employer] Act, this Virtual  Visit was conducted, with patient's consent, to reduce the patient's risk of exposure to COVID-19 and provide continuity of care for an established patient. Services were provided through a video synchronous discussion virtually to substitute for in-person clinic visit. [address] [place] [address] OB-GYN  [email or web address]  [phone]    Sonido Marrero MD, FACOG       OB/GYN Problem visit    Chief Complaint:   Chief Complaint   Patient presents with    Post-Partum Complications    Anxiety       Last or next WWE is: 10/10/2023    History of Present Illness: This is not a new problem being evaluated by this provider. The patient is a 32 y.o.  female who reports having anxiety & depression PP. Pt is on prozac 10mg 2x day, she likes this dose, she needs a refill. Pt's EPDS score today was 17. On 22 her EPDS was 19. She reports the symptoms are is unchanged. Aggravating factors include none. Alleviating factors include none. Some cramping and spotting on depo provera. She does not have other concerns. LMP: No LMP recorded.     PFSH:  Past Medical History:   Diagnosis Date    Depression     Anxiety    Encounter for Papanicolaou smear for cervical cancer screening 12/13/2021    neg/hpv not tested    LANCE (generalized anxiety disorder)     GERD (gastroesophageal reflux disease)     Hearing loss, left     Learning disability     reading    Obesity (BMI 35.0-39.9 without comorbidity)      Past Surgical History:   Procedure Laterality Date    HX OTHER SURGICAL  2019    wisdom teeth     Family History   Problem Relation Age of Onset    No Known Problems Mother     No Known Problems Father     Diabetes Paternal Grandmother     No Known Problems Half-sister     No Known Problems Half-brother     No Known Problems Half-brother      Social History     Tobacco Use    Smoking status: Never    Smokeless tobacco: Never   Vaping Use    Vaping Use: Never used   Substance Use Topics    Alcohol use: Never    Drug use: Never     Allergies   Allergen Reactions    Amoxicillin Hives    Grass Pollen Hives and Rash    Kiwi Hives    Penicillins Hives     Current Outpatient Medications   Medication Sig    FLUoxetine (PROzac) 40 mg capsule Take 1 Capsule by mouth in the morning for 30 days. ibuprofen (MOTRIN) 600 mg tablet Take 1 Tablet by mouth every six (6) hours as needed for Pain. Take with food. omeprazole (PRILOSEC) 40 mg capsule Take 1 Capsule by mouth daily. ascorbic acid, vitamin C, (VITAMIN C) 250 mg tablet Take  by mouth. (Patient not taking: No sig reported)    ferrous sulfate 325 mg (65 mg iron) tablet Take  by mouth Daily (before breakfast). (Patient not taking: No sig reported)    prenatal vit86-iron-folic acid (Nestabs) 08-4,262 mg-mcg tab Take 1 Tablet by mouth daily. (Patient not taking: No sig reported)     No current facility-administered medications for this visit.        Review of Systems:  History obtained from the patient  Constitutional: negative for fevers, chills and weight loss  ENT ROS: negative for - hearing change, oral lesions or visual changes  Respiratory: negative for cough, wheezing or dyspnea on exertion  Cardiovascular: negative for chest pain, irregular heart beats, exertional chest pressure/discomfort  Gastrointestinal: negative for dysphagia, nausea and vomiting  Genito-Urinary ROS:  see HPI  Inteument/breast: negative for rash, breast lump and nipple discharge  Musculoskeletal:negative for stiff joints, neck pain and muscle weakness  Endocrine ROS: negative for - breast changes, galactorrhea or temperature intolerance  Hematological and Lymphatic ROS: negative for - blood clots, bruising or swollen lymph nodes    Physical Exam:  There were no vitals taken for this visit. Objective:     General: alert, cooperative, no distress   Mental  status: mental status: alert, oriented to person, place, and time, normal mood, behavior, speech, dress, motor activity, and thought processes   Resp: resp: normal effort and no respiratory distress   Neuro: neuro: no gross deficits   Skin: skin: no discoloration or lesions of concern on visible areas   Due to this being a TeleHealth evaluation, many elements of the physical examination are unable to be assessed. Assessment:  No diagnosis found. Plan:  The patient is advised that she should contact the office if she does not note improvement or if symptoms recur  Recommend follow up with PCP for non-gynecologic complaints and chronic medical problems. She should contact our office with any questions or concerns  She could keep her routine annual exam appointment. Will try increase dose of prozac for at least 2 wks  Disc typical sx of bleeding and cramping on depo provera  We discussed safer NSAID dosing for heavy cycles. Pt was advised to take this dose with food and not to take for more than 5 days. Disc RBA including bleeding/gastric irritation. FU MD if NI to discuss other options. Notify MD if sx do not improve. FU with WWE 2-3 mos or sooner prn. This patient was seen virtually during the COVID-19 pandemic.   Please note some clinical care decisions may be influenced because of the current outbreak. Orders Placed This Encounter    FLUoxetine (PROzac) 40 mg capsule    ibuprofen (MOTRIN) 600 mg tablet         No results found for this visit on 08/05/22.

## 2022-08-08 ENCOUNTER — PATIENT MESSAGE (OUTPATIENT)
Dept: OBGYN CLINIC | Age: 27
End: 2022-08-08

## 2022-08-08 ENCOUNTER — TELEPHONE (OUTPATIENT)
Dept: OBGYN CLINIC | Age: 27
End: 2022-08-08

## 2022-08-08 NOTE — TELEPHONE ENCOUNTER
Pt called  and name verified. Pt reporting spotting at this time. Advised pt of md recommendations. Also of how it could take up to 1-2 injections to regulate. Gave bleeding precautions. Pt states she is only spotting at this time. Also advised she could take NSAIDs if needed. Pt verbalized understanding.

## 2022-08-17 ENCOUNTER — TELEPHONE (OUTPATIENT)
Dept: OBGYN CLINIC | Age: 27
End: 2022-08-17

## 2022-08-17 DIAGNOSIS — K21.9 GASTROESOPHAGEAL REFLUX DISEASE, UNSPECIFIED WHETHER ESOPHAGITIS PRESENT: ICD-10-CM

## 2022-08-18 RX ORDER — IBUPROFEN 600 MG/1
600 TABLET ORAL
Qty: 30 TABLET | Refills: 0 | Status: SHIPPED | OUTPATIENT
Start: 2022-08-18 | End: 2022-09-11 | Stop reason: SDUPTHER

## 2022-08-18 NOTE — TELEPHONE ENCOUNTER
32year old patient last seen in the office on 8/5/2022 for virtual pp visit    Patient started on depo    ?  Ok to send refill      Rx pended for amend/sign    Thank you      Requested refill last sent on 8/5/2022

## 2022-08-19 RX ORDER — OMEPRAZOLE 40 MG/1
40 CAPSULE, DELAYED RELEASE ORAL DAILY
Qty: 90 CAPSULE | Refills: 3 | OUTPATIENT
Start: 2022-08-19

## 2022-08-23 ENCOUNTER — TELEPHONE (OUTPATIENT)
Dept: FAMILY MEDICINE CLINIC | Age: 27
End: 2022-08-23

## 2022-08-23 RX ORDER — OMEPRAZOLE 40 MG/1
40 CAPSULE, DELAYED RELEASE ORAL DAILY
Qty: 90 CAPSULE | Refills: 3 | OUTPATIENT
Start: 2022-08-23

## 2022-09-11 ENCOUNTER — TELEPHONE (OUTPATIENT)
Dept: OBGYN CLINIC | Age: 27
End: 2022-09-11

## 2022-09-12 RX ORDER — IBUPROFEN 600 MG/1
600 TABLET ORAL
Qty: 30 TABLET | Refills: 0 | Status: SHIPPED | OUTPATIENT
Start: 2022-09-12 | End: 2022-10-12 | Stop reason: SDUPTHER

## 2022-09-12 NOTE — TELEPHONE ENCOUNTER
32year old patient seen on 8/5/2022 for pp visit and has next appointment on 10/10/2022     ?  Ok to refill has pended     Please amend/sign    Thank you

## 2022-09-20 ENCOUNTER — VIRTUAL VISIT (OUTPATIENT)
Dept: FAMILY MEDICINE CLINIC | Age: 27
End: 2022-09-20
Payer: MEDICAID

## 2022-09-20 DIAGNOSIS — K21.9 GASTROESOPHAGEAL REFLUX DISEASE, UNSPECIFIED WHETHER ESOPHAGITIS PRESENT: Primary | ICD-10-CM

## 2022-09-20 PROCEDURE — 99213 OFFICE O/P EST LOW 20 MIN: CPT | Performed by: FAMILY MEDICINE

## 2022-09-20 RX ORDER — NYSTATIN 100000 U/G
OINTMENT TOPICAL
COMMUNITY
Start: 2022-06-23

## 2022-09-20 RX ORDER — FLUOXETINE HYDROCHLORIDE 40 MG/1
40 CAPSULE ORAL DAILY
COMMUNITY
End: 2022-10-10 | Stop reason: SDUPTHER

## 2022-09-20 RX ORDER — PANTOPRAZOLE SODIUM 40 MG/1
40 TABLET, DELAYED RELEASE ORAL DAILY
Qty: 30 TABLET | Refills: 1 | Status: SHIPPED | OUTPATIENT
Start: 2022-09-20

## 2022-09-20 NOTE — PROGRESS NOTES
Daxa Barrios is a 32 y.o. female who was seen by synchronous (real-time) audio-video technology on 9/20/2022. Assessment & Plan:   Diagnoses and all orders for this visit:    1. Gastroesophageal reflux disease, unspecified whether esophagitis present  -     pantoprazole (PROTONIX) 40 mg tablet; Take 1 Tablet by mouth daily. Abdominal pain possibly due to uncontrolled gastroesophageal reflux disease  Stop Prilosec  Start Protonix  Decrease fat in diet    Follow-up and Dispositions    Return in about 2 months (around 11/20/2022) for GERD. Reviewed plan of care. Patient has provided input and agrees with goals. CPT Codes 35787-16790 for Established Patients may apply to this Telehealth Visit      Subjective:   Daxa Barrios was seen for GERD (Follow up.)      Patient presents with:  GERD: Follow up. She is taking her Prilosec regularly. No gastroesophageal reflux disease symptoms, but her stomach hurts when she eats greasy foods, which is every day. Review of Systems   Respiratory:  Negative for cough, shortness of breath and wheezing. Gastrointestinal:  Negative for heartburn, nausea and vomiting. Objective:   /\  BP Readings from Last 3 Encounters:   07/22/22 128/81   06/19/22 (!) 151/91   06/17/22 (!) 137/95       Physical Exam  Constitutional:       General: She is not in acute distress. Appearance: Normal appearance. She is not ill-appearing. Neurological:      Mental Status: She is alert and oriented to person, place, and time. Due to this being a TeleHealth evaluation, many elements of the physical examination are unable to be assessed. We discussed the expected course, resolution and complications of the diagnosis(es) in detail. Medication risks, benefits, costs, interactions, and alternatives were discussed as indicated. I advised her to contact the office if her condition worsens, changes or fails to improve as anticipated. She expressed understanding with the diagnosis(es) and plan. Pursuant to the emergency declaration under the [facility], [facility] waiver authority and the [employer] and [employer] Act, this Virtual  Visit was conducted, with patient's consent, to reduce the patient's risk of exposure to COVID-19 and provide continuity of care for an established patient. Services were provided through a video synchronous discussion virtually to substitute for in-person clinic visit.     Leanne Trevino MD

## 2022-09-20 NOTE — PROGRESS NOTES
Chief Complaint   Patient presents with    GERD     Follow up. 1. Have you been to the ER, urgent care clinic since your last visit? Hospitalized since your last visit? No    2. Have you seen or consulted any other health care providers outside of the [address] [place] [address] since your last visit? Include any pap smears or colon screening.  No

## 2022-10-07 ENCOUNTER — PATIENT MESSAGE (OUTPATIENT)
Dept: OBGYN CLINIC | Age: 27
End: 2022-10-07

## 2022-10-10 ENCOUNTER — OFFICE VISIT (OUTPATIENT)
Dept: OBGYN CLINIC | Age: 27
End: 2022-10-10
Payer: MEDICAID

## 2022-10-10 VITALS
WEIGHT: 203.6 LBS | HEART RATE: 85 BPM | DIASTOLIC BLOOD PRESSURE: 83 MMHG | BODY MASS INDEX: 33.88 KG/M2 | SYSTOLIC BLOOD PRESSURE: 126 MMHG

## 2022-10-10 DIAGNOSIS — Z76.89 ENCOUNTER FOR MENSTRUAL REGULATION: ICD-10-CM

## 2022-10-10 DIAGNOSIS — Z30.42 ENCOUNTER FOR DEPO-PROVERA CONTRACEPTION: ICD-10-CM

## 2022-10-10 DIAGNOSIS — Z01.411 ENCOUNTER FOR GYNECOLOGICAL EXAMINATION (GENERAL) (ROUTINE) WITH ABNORMAL FINDINGS: Primary | ICD-10-CM

## 2022-10-10 PROCEDURE — 99212 OFFICE O/P EST SF 10 MIN: CPT | Performed by: OBSTETRICS & GYNECOLOGY

## 2022-10-10 RX ORDER — FLUOXETINE HYDROCHLORIDE 40 MG/1
40 CAPSULE ORAL DAILY
Qty: 90 CAPSULE | Refills: 1 | Status: SHIPPED | OUTPATIENT
Start: 2022-10-10 | End: 2023-01-08

## 2022-10-10 RX ORDER — MEDROXYPROGESTERONE ACETATE 150 MG/ML
150 INJECTION, SUSPENSION INTRAMUSCULAR ONCE
Status: COMPLETED | OUTPATIENT
Start: 2022-10-10 | End: 2022-10-10

## 2022-10-10 RX ORDER — MEDROXYPROGESTERONE ACETATE 150 MG/ML
INJECTION, SUSPENSION INTRAMUSCULAR
COMMUNITY
Start: 2022-10-04 | End: 2022-10-10 | Stop reason: SDUPTHER

## 2022-10-10 RX ORDER — MEDROXYPROGESTERONE ACETATE 150 MG/ML
INJECTION, SUSPENSION INTRAMUSCULAR
Qty: 1 EACH | Refills: 4
Start: 2022-10-10

## 2022-10-10 RX ADMIN — MEDROXYPROGESTERONE ACETATE 150 MG: 150 INJECTION, SUSPENSION INTRAMUSCULAR at 13:04

## 2022-10-10 NOTE — PROGRESS NOTES
[address] [facility]  [email or web address]  [phone]    Royal Bettie MD, FACOG     Annual Gynecologic Exam:  WWE <40  Chief Complaint   Patient presents with    Well Woman    Depo         Sunil Villareal is a 32 y.o.  [address] [facility] female who presents for an annual well woman exam.  No LMP recorded. .    She reports the following additional concerns: none. Requests prozac and ibuprofen refills. Menstrual status:  She does not report dysmenorrhea/painful menses. She does not report heavy menses. She does not report irregular bleeding. Sexual history and Contraception:  Social History     Substance and Sexual Activity   Sexual Activity Yes    Birth control/protection: None       She does not reports new sexual partner(s) in the last year. Preventive Medicine History:  Her most recent Pap smear result: normal was obtained in 2021  Her most recent HR HPV screen was not tested     She does not have a history of BRIAN 2, 3 or cervical cancer.      Past Medical History:   Diagnosis Date    Depression     Anxiety    Encounter for Papanicolaou smear for cervical cancer screening 2021    neg/hpv not tested    LANCE (generalized anxiety disorder)     GERD (gastroesophageal reflux disease)     Hearing loss, left     Learning disability     reading    Obesity (BMI 35.0-39.9 without comorbidity)      OB History    Para Term  AB Living   1 1 1 0 0 1   SAB IAB Ectopic Molar Multiple Live Births   0 0 0 0 0 1      # Outcome Date GA Lbr Yunior/2nd Weight Sex Delivery Anes PTL Lv   1 Term 06/15/22 39w2d  6 lb 8.8 oz (2.97 kg) F Vag-Forceps EPI N ABDOULAYE     Past Surgical History:   Procedure Laterality Date    HX OTHER SURGICAL  2019    wisdom teeth     Family History   Problem Relation Age of Onset    No Known Problems Mother     No Known Problems Father     Diabetes Paternal Grandmother     No Known Problems Half-sister     No Known Problems Half-brother     No Known Problems Half-brother      Social History     Socioeconomic History    Marital status: SINGLE     Spouse name: Not on file    Number of children: Not on file    Years of education: Not on file    Highest education level: Not on file   Occupational History    Not on file   Tobacco Use    Smoking status: Never    Smokeless tobacco: Never   Vaping Use    Vaping Use: Never used   Substance and Sexual Activity    Alcohol use: Never    Drug use: Never    Sexual activity: Yes     Birth control/protection: None   Other Topics Concern     Service Not Asked    Blood Transfusions Not Asked    Caffeine Concern Not Asked    Occupational Exposure Not Asked    Hobby Hazards Not Asked    Sleep Concern Not Asked    Stress Concern Not Asked    Weight Concern Not Asked    Special Diet Not Asked    Back Care Not Asked    Exercise Not Asked    Bike Helmet Not Asked    Seat Belt Not Asked    Self-Exams Not Asked   Social History Narrative    Not on file     Social Determinants of Health     Financial Resource Strain: Not on file   Food Insecurity: Not on file   Transportation Needs: Not on file   Physical Activity: Not on file   Stress: Not on file   Social Connections: Not on file   Intimate Partner Violence: Not on file   Housing Stability: Not on file       Allergies   Allergen Reactions    Amoxicillin Hives    Grass Pollen Hives and Rash    Kiwi Hives    Penicillins Hives       Current Outpatient Medications   Medication Sig    medroxyPROGESTERone (DEPO-PROVERA) 150 mg/mL syrg INJECT 1ML BY INTRAMUSCULAR ONCE FOR 1 DOSE.    ibuprofen (MOTRIN) 600 mg tablet Take 1 Tablet by mouth every six (6) hours as needed for Pain. Take with food. FLUoxetine (PROzac) 40 mg capsule Take 1 Capsule by mouth daily for 90 days. nystatin (MYCOSTATIN) 100,000 unit/gram ointment  (Patient not taking: Reported on 10/10/2022)    pantoprazole (PROTONIX) 40 mg tablet Take 1 Tablet by mouth daily.  (Patient not taking: Reported on 10/10/2022)     No current facility-administered medications for this visit. Patient Active Problem List   Diagnosis Code    Depression F32. A    Learning disability F81.9    Hearing impairment H91.90    GERD (gastroesophageal reflux disease) K21.9    Encounter for induction of labor Z34.90         Review of Systems - History obtained from the patient and patient filled out questionnaire   Constitutional/general, HEENT, CV, Resp, GI, MSK, Neuro, Psych, Heme/lymph, Skin, Breast ROS: no significant complaints except as noted on HPI    Physical Exam  Visit Vitals  /83   Pulse 85   Wt 203 lb 9.6 oz (92.4 kg)   BMI 33.88 kg/m²       Constitutional  Appearance: well-nourished, well developed, alert, in no acute distress    HENT  Head and Face: appears normal    Neck  Inspection/Palpation: normal appearance, no masses or tenderness  Lymph Nodes: no lymphadenopathy present  Thyroid: gland size normal, nontender, no nodules or masses present on palpation    Chest  Respiratory Effort: breathing unlabored  Auscultation: normal breath sounds    Cardiovascular  Heart:   Auscultation: regular rate and rhythm without murmur    Breasts  Inspection of Breasts: breasts symmetrical, no skin changes, no discharge present, nipple appearance normal, no skin retraction present  Palpation of Breasts and Axillae: no masses present on palpation, no breast tenderness  Axillary Lymph Nodes: no lymphadenopathy present    Gastrointestinal  Abdominal Examination: abdomen non-tender to palpation, normal bowel sounds, no masses present  Liver and spleen: no hepatomegaly present, spleen not palpable  Hernias: no hernias identified    Genitourinary  External Genitalia: normal appearance for age, no discharge present, no tenderness present, no inflammatory lesions present, no masses present  Vagina: normal vaginal vault without central or paravaginal defects, minimal discharge present, no inflammatory lesions present, no masses present  Bladder: non-tender to palpation  Urethra: appears normal  Cervix: normal   Uterus: normal size, shape and consistency  Adnexa: no adnexal tenderness present, no adnexal masses present  Perineum: perineum within normal limits, no evidence of trauma, no rashes or skin lesions present  Anus: anus within normal limits, no hemorrhoids present  Inguinal Lymph Nodes: no lymphadenopathy present    Skin  General Inspection: no rash, no lesions identified    Neurologic/Psychiatric  Mental Status:  Orientation: grossly oriented to person, place and time  Mood and Affect: mood normal, affect appropriate    Assessment:  32 y.o.  for well woman exam  Encounter Diagnoses   Name Primary? Encounter for Depo-Provera contraception     Encounter for menstrual regulation     Postpartum depression     Encounter for gynecological examination (general) (routine) with abnormal findings Yes       Plan:  The patient was counseled about diet, exercise, healthy lifestyle  We discussed current pap smear and HR HPV testing guidelines. I recommended follow up one year for routine annual gynecologic exam or sooner prn  Handouts were given to the patient  I recommended follow up with a primary care physician for chronic medical problems and evaluation of non-gynecologic concerns and to please contact our office with any GYN questions or concerns. I recommended testing per CDC guidelines and at patient request.   We discussed progesterone only and non hormonal options for contraception including but not limited to condoms, IUDs, Nexplanon, and depo provera.   Pt wants to continue depo provera and prozac  Mood better, did not see counselor  Mood fu 6mos can do w depo              Folllow up:  [x] return for annual well woman exam in one year or sooner if she is having problems  [] follow up and ultrasound  [] 6 months  [] 3 months  [] 6 weeks   [] 1 month    Orders Placed This Encounter    FLUoxetine (PROzac) 40 mg capsule medroxyPROGESTERone (DEPO-PROVERA) 150 mg/mL syrg    medroxyPROGESTERone (DEPO-PROVERA) 150 mg/mL injection 150 mg       No results found for any visits on 10/10/22.

## 2022-10-10 NOTE — PROGRESS NOTES
Last Depo-Provera injection: 7/22/22  Side Effects if any: none  Serum HCG indicated? Not indicated   Depo-Provera 150 mg IM given by: Jaden Schwab LPN in deltoid ( left). Next Depo-Provera injection due: Dec 26 - January 9    Administered 150mg/mL Depo-Provera per orders of Zhen Mcknight MD . Verbal consent received by Ms. Henry & injection given. Patient tolerated well, no complications and no side effects. Encouraged her to remain in clinic for 15 minutes and immediately report any adverse reactions. Patient informed of next injection date ranges and encouraged to schedule. She verbalized understanding and expressed intent to comply.

## 2022-10-12 ENCOUNTER — TELEPHONE (OUTPATIENT)
Dept: OBGYN CLINIC | Age: 27
End: 2022-10-12

## 2022-10-13 RX ORDER — IBUPROFEN 600 MG/1
600 TABLET ORAL
Qty: 30 TABLET | Refills: 1 | Status: SHIPPED | OUTPATIENT
Start: 2022-10-13

## 2022-10-13 NOTE — TELEPHONE ENCOUNTER
32year old patient last seen in the office on 10/10/2022    Please advise regarding pended medication    Please amend/sign  Thank you

## 2022-11-05 ENCOUNTER — TELEPHONE (OUTPATIENT)
Dept: OBGYN CLINIC | Age: 27
End: 2022-11-05

## 2022-11-05 NOTE — TELEPHONE ENCOUNTER
Returned call from answering service, spoke with pt ~ 1350. Gyn pt of Dr. Shahnaz Alford. On DMPA. C/o cramping. No vaginal bleeding. Has RX for ibuprofen 600mg, just took about 5 minutes prior. Ibuprofen helps but \"takes a while to kick in\". Wondering why she is having the cramping and what she can do about it. Adv can try heating pad, magnesium (250-500mg), switch to OTC Aleve (would need to wait 6hrs from when she just took the ibuprofen), or can take scheduled ibuprofen 600mg q 6hrs. Advised that I cannot dx her pain over the phone. Can try above measures. Advised to contact office on Monday to schedule office appt.

## 2022-11-06 ENCOUNTER — TELEPHONE (OUTPATIENT)
Dept: OBGYN CLINIC | Age: 27
End: 2022-11-06

## 2022-11-06 NOTE — TELEPHONE ENCOUNTER
[Late entry - spoke with pt 11/5/22 ~7516]    Pt of Dr. Heron Loving. Gyn. On DMPA. I had spoke with her earlier in the day regarding her cramping. Still having cramping. Wondering why she is having it and what she can do. Again, reviewed measures as previously discussed. Advised can go to ER if severe, otherwise can call office on Monday to schedule appointment. Counseled again that there is nothing else that I can do over the phone.

## 2022-11-07 NOTE — TELEPHONE ENCOUNTER
32year old patient is calling back again today last seen in the office on 10/10/2022    Patient complaining of stomach pain in the middle above the umbilicus and right side pain that started yesterday and again today at 3am    Patient is on depo and denies vaginal bleeding,and reports history of acid reflux and reports her urine is yellow    Patient was advised to contact her PCP for evaluation and to check upt      Patient verbalized understanding.

## 2022-11-07 NOTE — TELEPHONE ENCOUNTER
[address] message sent to pt advising her to come in for prob visit & ultrasound    Pt seeing PCP 11/11/22

## 2022-11-11 ENCOUNTER — OFFICE VISIT (OUTPATIENT)
Dept: FAMILY MEDICINE CLINIC | Age: 27
End: 2022-11-11
Payer: MEDICAID

## 2022-11-11 VITALS
RESPIRATION RATE: 20 BRPM | WEIGHT: 196 LBS | OXYGEN SATURATION: 100 % | HEART RATE: 98 BPM | HEIGHT: 65 IN | DIASTOLIC BLOOD PRESSURE: 70 MMHG | TEMPERATURE: 97.6 F | BODY MASS INDEX: 32.65 KG/M2 | SYSTOLIC BLOOD PRESSURE: 124 MMHG

## 2022-11-11 DIAGNOSIS — R10.13 EPIGASTRIC PAIN: Primary | ICD-10-CM

## 2022-11-11 DIAGNOSIS — K21.9 GASTROESOPHAGEAL REFLUX DISEASE, UNSPECIFIED WHETHER ESOPHAGITIS PRESENT: ICD-10-CM

## 2022-11-11 DIAGNOSIS — F81.9 LEARNING DISABILITY: ICD-10-CM

## 2022-11-11 PROCEDURE — 99215 OFFICE O/P EST HI 40 MIN: CPT | Performed by: FAMILY MEDICINE

## 2022-11-11 RX ORDER — PANTOPRAZOLE SODIUM 40 MG/1
40 TABLET, DELAYED RELEASE ORAL DAILY
Qty: 30 TABLET | Refills: 1
Start: 2022-11-11 | End: 2022-11-17 | Stop reason: SDUPTHER

## 2022-11-11 NOTE — PATIENT INSTRUCTIONS
Do not take any more NSAIDS (Ibuprofen, motrin, aleve, naproxen, aspirin, etc.). Avoid spicy or fatty/greasy foods, coffee, alcohol and sodas.

## 2022-11-11 NOTE — PROGRESS NOTES
Chief Complaint   Patient presents with    Abdominal Pain     X 4 days   Patient has vomited once from pain

## 2022-11-11 NOTE — PROGRESS NOTES
Ebonie De Jesus (: 1995) is a 32 y.o. female, established patient, here for evaluation of the following chief complaint(s):  Abdominal Pain (X 4 days /Patient has vomited once from pain )       ASSESSMENT/PLAN:  Below is the assessment and plan developed based on review of pertinent history, physical exam, labs, studies, and medications. 1. Epigastric pain  2. Gastroesophageal reflux disease, unspecified whether esophagitis present  3. Learning Disability  Differential diagnosis: peptic ulcer disease 2/2 NSAID use most likely, also could consider H Pylori; additionally could be biliary colic or cholelithiasis/choledocholithiasis. Post prandial epigastric pain with greasy foods fits both dyspepsic as well as biliary syndrome, though I would favor dyspepsic as there is an strong ibuprofen use, and negative Adame's sign on exam. Most likely an ulcer given the nausea and vomiting. Reassurred by lack of blood in emesis or stool. I would like to ensure no anemia present, check biliary labs and rule out stone obstruction with imaging. However, the main focus will be on peptic ulcer treatment, which will consistent of continued PPI therapy and stopping ibuprofen use, especially for stomach pains. We spent a significant amount of time doing education on the differential, workup, and especially treatment. Patient was able to teach back to me the plan of care twice. Note: it does appear that patient has been treating stomach pain with the ibuprofen, thinking that it was what she should do because she was taught to use ibuprofen for menstrual cramps. However, she has been using it for the epigastric pain. -     CBC W/O DIFF; Future  -     METABOLIC PANEL, COMPREHENSIVE; Future  -     US ABD LTD; Future  -     pantoprazole (PROTONIX) 40 mg tablet; Take 1 Tablet by mouth daily. , No Print, Disp-30 Tablet, R-1  - if not improved despite plan, or signs of bleeding, will need upper GI endoscopy with biopsies of any ulcer.    Return in about 2 months (around 1/11/2023) for epigastric pain follow up. Given ED return precautions for signs of unstable vital signs. SUBJECTIVE/OBJECTIVE:  HPI  Past few months of post-postprandial epigastric pain, especially with greasy foods. Seen by Dr. Samir Peña and prescribed Protonix 40mg daily with adherence to box instructions. 3 days of worsening, now having once a day bout of nausea and initially bilious, now non-bilious vomiting, never bloody. Still eating fatty foods. Takes ibuprofen 600mg f5qmomy, for a couple of years now though stopped during pregnancy; restarted when periods restarted, and is around the time the stomach pains started. Review of Systems  No melana, no dizzyness, lightheadedness. No icterus. Physical Exam  Abd: flat, soft, TTP at epigastric and a little on RUQ, negative Adame's sign. On this date 11/11/2022 I have spent 40 minutes reviewing previous notes, test results and face to face with the patient discussing the diagnosis and importance of compliance with the treatment plan as well as documenting on the day of the visit. An electronic signature was used to authenticate this note.   -- Nayla Strickland MD

## 2022-11-17 DIAGNOSIS — K21.9 GASTROESOPHAGEAL REFLUX DISEASE, UNSPECIFIED WHETHER ESOPHAGITIS PRESENT: ICD-10-CM

## 2022-11-17 RX ORDER — PANTOPRAZOLE SODIUM 40 MG/1
40 TABLET, DELAYED RELEASE ORAL DAILY
Qty: 30 TABLET | Refills: 1 | Status: SHIPPED | OUTPATIENT
Start: 2022-11-17

## 2023-02-12 RX ORDER — NORETHINDRONE ACETATE AND ETHINYL ESTRADIOL 1MG-20(21)
1 KIT ORAL DAILY
Qty: 3 DOSE PACK | Refills: 3 | Status: SHIPPED | OUTPATIENT
Start: 2023-02-12

## 2023-02-22 ENCOUNTER — PATIENT MESSAGE (OUTPATIENT)
Dept: OBGYN CLINIC | Age: 28
End: 2023-02-22

## 2023-03-10 ENCOUNTER — HOSPITAL ENCOUNTER (EMERGENCY)
Age: 28
Discharge: HOME | End: 2023-03-10
Attending: EMERGENCY MEDICINE
Payer: MEDICAID

## 2023-03-10 ENCOUNTER — APPOINTMENT (OUTPATIENT)
Dept: ULTRASOUND IMAGING | Age: 28
End: 2023-03-10
Attending: PHYSICIAN ASSISTANT
Payer: MEDICAID

## 2023-03-10 ENCOUNTER — NURSE TRIAGE (OUTPATIENT)
Dept: OTHER | Facility: CLINIC | Age: 28
End: 2023-03-10

## 2023-03-10 ENCOUNTER — APPOINTMENT (OUTPATIENT)
Dept: CT IMAGING | Age: 28
End: 2023-03-10
Attending: PHYSICIAN ASSISTANT
Payer: MEDICAID

## 2023-03-10 VITALS
BODY MASS INDEX: 31.65 KG/M2 | OXYGEN SATURATION: 99 % | WEIGHT: 190 LBS | SYSTOLIC BLOOD PRESSURE: 126 MMHG | HEIGHT: 65 IN | RESPIRATION RATE: 16 BRPM | DIASTOLIC BLOOD PRESSURE: 89 MMHG | TEMPERATURE: 97.7 F | HEART RATE: 96 BPM

## 2023-03-10 DIAGNOSIS — K80.20 CALCULUS OF GALLBLADDER WITHOUT CHOLECYSTITIS WITHOUT OBSTRUCTION: ICD-10-CM

## 2023-03-10 DIAGNOSIS — K52.9 GASTROENTERITIS: Primary | ICD-10-CM

## 2023-03-10 LAB
ALBUMIN SERPL-MCNC: 3.7 G/DL (ref 3.5–5)
ALBUMIN/GLOB SERPL: 0.8 (ref 1.1–2.2)
ALP SERPL-CCNC: 174 U/L (ref 45–117)
ALT SERPL-CCNC: 115 U/L (ref 12–78)
ANION GAP SERPL CALC-SCNC: 6 MMOL/L (ref 5–15)
APPEARANCE UR: ABNORMAL
AST SERPL-CCNC: 102 U/L (ref 15–37)
BACTERIA URNS QL MICRO: ABNORMAL /HPF
BASOPHILS # BLD: 0 K/UL (ref 0–0.1)
BASOPHILS NFR BLD: 0 % (ref 0–1)
BILIRUB DIRECT SERPL-MCNC: 0.7 MG/DL (ref 0–0.2)
BILIRUB SERPL-MCNC: 1.8 MG/DL (ref 0.2–1)
BILIRUB UR QL CFM: POSITIVE
BUN SERPL-MCNC: 10 MG/DL (ref 6–20)
BUN/CREAT SERPL: 12 (ref 12–20)
CALCIUM SERPL-MCNC: 9.3 MG/DL (ref 8.5–10.1)
CHLORIDE SERPL-SCNC: 108 MMOL/L (ref 97–108)
CO2 SERPL-SCNC: 22 MMOL/L (ref 21–32)
COLOR UR: ABNORMAL
CREAT SERPL-MCNC: 0.83 MG/DL (ref 0.55–1.02)
DIFFERENTIAL METHOD BLD: ABNORMAL
EOSINOPHIL # BLD: 0 K/UL (ref 0–0.4)
EOSINOPHIL NFR BLD: 0 % (ref 0–7)
EPITH CASTS URNS QL MICRO: ABNORMAL /LPF
ERYTHROCYTE [DISTWIDTH] IN BLOOD BY AUTOMATED COUNT: 13.6 % (ref 11.5–14.5)
GLOBULIN SER CALC-MCNC: 4.4 G/DL (ref 2–4)
GLUCOSE SERPL-MCNC: 111 MG/DL (ref 65–100)
GLUCOSE UR STRIP.AUTO-MCNC: NEGATIVE MG/DL
HCG UR QL: NEGATIVE
HCT VFR BLD AUTO: 43.1 % (ref 35–47)
HGB BLD-MCNC: 14.3 G/DL (ref 11.5–16)
HGB UR QL STRIP: NEGATIVE
HYALINE CASTS URNS QL MICRO: ABNORMAL /LPF (ref 0–2)
IMM GRANULOCYTES # BLD AUTO: 0 K/UL (ref 0–0.04)
IMM GRANULOCYTES NFR BLD AUTO: 0 % (ref 0–0.5)
KETONES UR QL STRIP.AUTO: NEGATIVE MG/DL
LEUKOCYTE ESTERASE UR QL STRIP.AUTO: ABNORMAL
LIPASE SERPL-CCNC: 135 U/L (ref 73–393)
LYMPHOCYTES # BLD: 2.5 K/UL (ref 0.8–3.5)
LYMPHOCYTES NFR BLD: 28 % (ref 12–49)
MCH RBC QN AUTO: 27.7 PG (ref 26–34)
MCHC RBC AUTO-ENTMCNC: 33.2 G/DL (ref 30–36.5)
MCV RBC AUTO: 83.4 FL (ref 80–99)
MONOCYTES # BLD: 0.4 K/UL (ref 0–1)
MONOCYTES NFR BLD: 4 % (ref 5–13)
NEUTS SEG # BLD: 6 K/UL (ref 1.8–8)
NEUTS SEG NFR BLD: 68 % (ref 32–75)
NITRITE UR QL STRIP.AUTO: NEGATIVE
NRBC # BLD: 0 K/UL (ref 0–0.01)
NRBC BLD-RTO: 0 PER 100 WBC
PH UR STRIP: >8.5 [PH] (ref 5–8)
PLATELET # BLD AUTO: 260 K/UL (ref 150–400)
PMV BLD AUTO: 10 FL (ref 8.9–12.9)
POTASSIUM SERPL-SCNC: 3.6 MMOL/L (ref 3.5–5.1)
PROT SERPL-MCNC: 8.1 G/DL (ref 6.4–8.2)
PROT UR STRIP-MCNC: 30 MG/DL
RBC # BLD AUTO: 5.17 M/UL (ref 3.8–5.2)
RBC #/AREA URNS HPF: ABNORMAL /HPF (ref 0–5)
SODIUM SERPL-SCNC: 136 MMOL/L (ref 136–145)
SP GR UR REFRACTOMETRY: 1.02
UA: UC IF INDICATED,UAUC: ABNORMAL
UROBILINOGEN UR QL STRIP.AUTO: 1 EU/DL (ref 0.2–1)
WBC # BLD AUTO: 8.9 K/UL (ref 3.6–11)
WBC URNS QL MICRO: ABNORMAL /HPF (ref 0–4)

## 2023-03-10 PROCEDURE — 81001 URINALYSIS AUTO W/SCOPE: CPT

## 2023-03-10 PROCEDURE — 80076 HEPATIC FUNCTION PANEL: CPT

## 2023-03-10 PROCEDURE — 99284 EMERGENCY DEPT VISIT MOD MDM: CPT

## 2023-03-10 PROCEDURE — 74011250636 HC RX REV CODE- 250/636: Performed by: PHYSICIAN ASSISTANT

## 2023-03-10 PROCEDURE — 85025 COMPLETE CBC W/AUTO DIFF WBC: CPT

## 2023-03-10 PROCEDURE — 81025 URINE PREGNANCY TEST: CPT

## 2023-03-10 PROCEDURE — 96375 TX/PRO/DX INJ NEW DRUG ADDON: CPT

## 2023-03-10 PROCEDURE — 76705 ECHO EXAM OF ABDOMEN: CPT

## 2023-03-10 PROCEDURE — 80048 BASIC METABOLIC PNL TOTAL CA: CPT

## 2023-03-10 PROCEDURE — 96374 THER/PROPH/DIAG INJ IV PUSH: CPT

## 2023-03-10 PROCEDURE — 83690 ASSAY OF LIPASE: CPT

## 2023-03-10 RX ORDER — DICYCLOMINE HYDROCHLORIDE 20 MG/1
20 TABLET ORAL
Qty: 20 TABLET | Refills: 0 | Status: SHIPPED | OUTPATIENT
Start: 2023-03-10

## 2023-03-10 RX ORDER — ONDANSETRON 2 MG/ML
4 INJECTION INTRAMUSCULAR; INTRAVENOUS
Status: COMPLETED | OUTPATIENT
Start: 2023-03-10 | End: 2023-03-10

## 2023-03-10 RX ORDER — KETOROLAC TROMETHAMINE 30 MG/ML
15 INJECTION, SOLUTION INTRAMUSCULAR; INTRAVENOUS
Status: COMPLETED | OUTPATIENT
Start: 2023-03-10 | End: 2023-03-10

## 2023-03-10 RX ORDER — ONDANSETRON 4 MG/1
4 TABLET, ORALLY DISINTEGRATING ORAL
Qty: 20 TABLET | Refills: 0 | Status: SHIPPED | OUTPATIENT
Start: 2023-03-10

## 2023-03-10 RX ORDER — SODIUM CHLORIDE 9 MG/ML
1000 INJECTION, SOLUTION INTRAVENOUS ONCE
Status: COMPLETED | OUTPATIENT
Start: 2023-03-10 | End: 2023-03-10

## 2023-03-10 RX ADMIN — ONDANSETRON 4 MG: 2 INJECTION INTRAMUSCULAR; INTRAVENOUS at 11:14

## 2023-03-10 RX ADMIN — KETOROLAC TROMETHAMINE 15 MG: 30 INJECTION, SOLUTION INTRAMUSCULAR at 11:14

## 2023-03-10 RX ADMIN — SODIUM CHLORIDE 1000 ML: 9 INJECTION, SOLUTION INTRAVENOUS at 11:13

## 2023-03-10 NOTE — TELEPHONE ENCOUNTER
Location of patient: [place]    Received call from [place] at [facility] with [employer]. Subjective: Caller states \"severe abdominal pain, acid reflux and having diarrhea\"     Current Symptoms: central abdominal pain, diarrhea and acid reflux    Onset: 0200 today; sudden    Associated Symptoms: reduced activity, reduced appetite, increased wakefulness, diarrhea    Pain Severity: 10/10; cramping; constant    Temperature: no     What has been tried: nothing    LMP:  BC pills  Pregnant: Unknown    Recommended disposition: Go to ED Now    Care advice provided, patient verbalizes understanding; denies any other questions or concerns; instructed to call back for any new or worsening symptoms. Patient/caller agrees to proceed to the Emergency Department    Attention Provider: Thank you for allowing me to participate in the care of your patient. The patient was connected to triage in response to information provided to the [facility]. Please do not respond through this encounter as the response is not directed to a shared pool.         Reason for Disposition   SEVERE abdominal pain (e.g., excruciating)    Protocols used: Abdominal Pain - Female-ADULT-[place]

## 2023-03-10 NOTE — ED PROVIDER NOTES
[facility] EMERGENCY DEPT  EMERGENCY DEPARTMENT ENCOUNTER       Pt Name: Chico Ferrari  MRN: [id number]  [place] 1995  Date of evaluation: 3/10/2023  Provider: RICHELLE Foreman   PCP: Karina Gomez MD  Note Started: 11:09 AM 3/10/23     CHIEF COMPLAINT       Chief Complaint   Patient presents with    Abdominal Pain    Diarrhea     Onset 3am diarrhea and upper abdominal pain loose, had some yesterday as well. No vomiting. HISTORY OF PRESENT ILLNESS: 1 or more elements      History From: Patient  HPI Limitations : None     Chico Ferrari is a 32 y.o. female who presents with abdominal pain. The patient reports that she was woken from sleep at 2:00 this morning with periumbilical abdominal pain. The pain has been persistent and severe. She has had associated watery diarrhea. She does note that she ate hot pockets for dinner last night and vomited them back up at approximately midnight. She denies fever, urinary symptoms. She has never had symptoms like this before and denies any abdominal surgeries in the past.     Nursing Notes were all reviewed and agreed with or any disagreements were addressed in the HPI. REVIEW OF SYSTEMS      Review of Systems     Positives and Pertinent negatives as per HPI.     PAST HISTORY     Past Medical History:  Past Medical History:   Diagnosis Date    Depression     Anxiety    Encounter for Papanicolaou smear for cervical cancer screening 12/13/2021    neg/hpv not tested    LANCE (generalized anxiety disorder)     GERD (gastroesophageal reflux disease)     Hearing loss, left     Learning disability     reading    Obesity (BMI 35.0-39.9 without comorbidity)        Past Surgical History:  Past Surgical History:   Procedure Laterality Date    HX OTHER SURGICAL  2019    wisdom teeth       Family History:  Family History   Problem Relation Age of Onset    No Known Problems Mother     No Known Problems Father     Diabetes Paternal Grandmother     No Known Problems Half-sister     No Known Problems Half-brother     No Known Problems Half-brother        Social History:  Social History     Tobacco Use    Smoking status: Never    Smokeless tobacco: Never   Vaping Use    Vaping Use: Never used   Substance Use Topics    Alcohol use: Never    Drug use: Never       Allergies: Allergies   Allergen Reactions    Amoxicillin Hives    Grass Pollen Hives and Rash    Kiwi Hives    Penicillins Hives       CURRENT MEDICATIONS      Discharge Medication List as of 3/10/2023  3:09 PM        CONTINUE these medications which have NOT CHANGED    Details   norethindrone-ethinyl estradiol (JUNEL FE 1/20) 1 mg-20 mcg (21)/75 mg (7) tab Take 1 Tablet by mouth daily. , Normal, Disp-3 Dose Pack, R-3      pantoprazole (PROTONIX) 40 mg tablet TAKE 1 TABLET BY MOUTH EVERY DAY, Normal, Disp-30 Tablet, R-1      medroxyPROGESTERone (DEPO-PROVERA) 150 mg/mL syrg INJECT 1ML BY INTRAMUSCULAR ONCE FOR 1 DOSE., No Print, Disp-1 Each, R-4             PHYSICAL EXAM      ED Triage Vitals [03/10/23 1045]   ED Encounter Vitals Group      /89      Pulse (Heart Rate) 96      Resp Rate 16      Temp 97.7 °F (36.5 °C)      Temp src       O2 Sat (%) 100 %      Weight 190 lb      Height 5' 5\"        Physical Exam  Vitals and nursing note reviewed. Constitutional:       General: She is not in acute distress. Cardiovascular:      Rate and Rhythm: Normal rate and regular rhythm. Heart sounds: No murmur heard. Pulmonary:      Effort: Pulmonary effort is normal. No respiratory distress. Breath sounds: Normal breath sounds. No wheezing. Abdominal:      Comments: Abdomen is soft. Tenderness to palpation of the periumbilical region. No rebound or guarding. No tenderness over McBurney's point. Negative Adame sign. Neurological:      Mental Status: She is alert.         DIAGNOSTIC RESULTS   LABS:     Recent Results (from the past 12 hour(s))   CBC WITH AUTOMATED DIFF    Collection Time: 03/10/23 10:58 AM Result Value Ref Range    WBC 8.9 3.6 - 11.0 K/uL    RBC 5.17 3.80 - 5.20 M/uL    HGB 14.3 11.5 - 16.0 g/dL    HCT 43.1 35.0 - 47.0 %    MCV 83.4 80.0 - 99.0 FL    MCH 27.7 26.0 - 34.0 PG    MCHC 33.2 30.0 - 36.5 g/dL    RDW 13.6 11.5 - 14.5 %    PLATELET 494 220 - 843 K/uL    MPV 10.0 8.9 - 12.9 FL    NRBC 0.0 0  WBC    ABSOLUTE NRBC 0.00 0.00 - 0.01 K/uL    NEUTROPHILS 68 32 - 75 %    LYMPHOCYTES 28 12 - 49 %    MONOCYTES 4 (L) 5 - 13 %    EOSINOPHILS 0 0 - 7 %    BASOPHILS 0 0 - 1 %    IMMATURE GRANULOCYTES 0 0.0 - 0.5 %    ABS. NEUTROPHILS 6.0 1.8 - 8.0 K/UL    ABS. LYMPHOCYTES 2.5 0.8 - 3.5 K/UL    ABS. MONOCYTES 0.4 0.0 - 1.0 K/UL    ABS. EOSINOPHILS 0.0 0.0 - 0.4 K/UL    ABS. BASOPHILS 0.0 0.0 - 0.1 K/UL    ABS. IMM. GRANS. 0.0 0.00 - 0.04 K/UL    DF AUTOMATED     METABOLIC PANEL, BASIC    Collection Time: 03/10/23 10:58 AM   Result Value Ref Range    Sodium 136 136 - 145 mmol/L    Potassium 3.6 3.5 - 5.1 mmol/L    Chloride 108 97 - 108 mmol/L    CO2 22 21 - 32 mmol/L    Anion gap 6 5 - 15 mmol/L    Glucose 111 (H) 65 - 100 mg/dL    BUN 10 6 - 20 MG/DL    Creatinine 0.83 0.55 - 1.02 MG/DL    BUN/Creatinine ratio 12 12 - 20      eGFR >60 >60 ml/min/1.73m2    Calcium 9.3 8.5 - 10.1 MG/DL   HEPATIC FUNCTION PANEL    Collection Time: 03/10/23 10:58 AM   Result Value Ref Range    Protein, total 8.1 6.4 - 8.2 g/dL    Albumin 3.7 3.5 - 5.0 g/dL    Globulin 4.4 (H) 2.0 - 4.0 g/dL    A-G Ratio 0.8 (L) 1.1 - 2.2      Bilirubin, total 1.8 (H) 0.2 - 1.0 MG/DL    Bilirubin, direct 0.7 (H) 0.0 - 0.2 MG/DL    Alk.  phosphatase 174 (H) 45 - 117 U/L    AST (SGOT) 102 (H) 15 - 37 U/L    ALT (SGPT) 115 (H) 12 - 78 U/L   LIPASE    Collection Time: 03/10/23 10:58 AM   Result Value Ref Range    Lipase 135 73 - 393 U/L   URINALYSIS W/ REFLEX CULTURE    Collection Time: 03/10/23 11:00 AM    Specimen: Miscellaneous sample; Urine    Urine specimen   Result Value Ref Range    Color DARK YELLOW      Appearance CLOUDY (A) CLEAR Specific gravity 1.025      pH (UA) >8.5 (H) 5.0 - 8.0    Protein 30 (A) NEG mg/dL    Glucose Negative NEG mg/dL    Ketone Negative NEG mg/dL    Blood Negative NEG      Urobilinogen 1.0 0.2 - 1.0 EU/dL    Nitrites Negative NEG      Leukocyte Esterase TRACE (A) NEG      UA:UC IF INDICATED CULTURE NOT INDICATED BY UA RESULT CNI      WBC 0-4 0 - 4 /hpf    RBC 0-5 0 - 5 /hpf    Epithelial cells MANY (A) FEW /lpf    Bacteria 2+ (A) NEG /hpf    Hyaline cast 0-2 0 - 2 /lpf   BILIRUBIN, CONFIRM    Collection Time: 03/10/23 11:00 AM   Result Value Ref Range    Bilirubin UA, confirm Positive (A) NEG     HCG URINE, QL. - POC    Collection Time: 03/10/23 11:05 AM   Result Value Ref Range    Pregnancy test,urine (POC) Negative NEG          EKG: When ordered, EKG's are interpreted by the Emergency Department Physician in the absence of a cardiologist.  Please see their note for interpretation of EKG. RADIOLOGY:  Non-plain film images such as CT, Ultrasound and MRI are read by the radiologist. Plain radiographic images are visualized and preliminarily interpreted by the ED Provider with the below findings:          Interpretation per the Radiologist below, if available at the time of this note:     US ABD LTD    Result Date: 3/10/2023  EXAM: [facility] INDICATION: Epigastric abdominal pain, elevated total bilirubin and LFTs COMPARISON: None TECHNIQUE: Limited abdominal ultrasound. FINDINGS: Liver: echogenicity is within normal limits. No focal liver lesion. Main portal vein flow: Toward the liver. Fluid: No ascites. Gallbladder: Gallstones. No gallbladder wall thickening or pericholecystic fluid. Negative sonographic Adame sign. Bile ducts: There is no intra or extrahepatic biliary ductal dilatation. The common bile duct measures 5 mm. Pancreas: The visualized portions are within normal limits. Kidneys: Right length: 12.2 cm. No hydronephrosis.      1.  Cholelithiasis, without specific evidence to suggest acute cholecystitis. 2.  Unremarkable liver and right kidney. PROCEDURES   Unless otherwise noted below, none  Procedures     CRITICAL CARE TIME       EMERGENCY DEPARTMENT COURSE and DIFFERENTIAL DIAGNOSIS/MDM   Vitals:    Vitals:    03/10/23 1045 03/10/23 1110   BP: 126/89    Pulse: 96    Resp: 16    Temp: 97.7 °F (36.5 °C)    SpO2: 100% 99%   Weight: 86.2 kg (190 lb)    Height: 5' 5\" (1.651 m)         Patient was given the following medications:  Medications   0.9% sodium chloride infusion 1,000 mL (0 mL IntraVENous IV Completed 3/10/23 1222)   ondansetron (ZOFRAN) injection 4 mg (4 mg IntraVENous Given 3/10/23 1114)   ketorolac (TORADOL) injection 15 mg (15 mg IntraVENous Given 3/10/23 1114)       CONSULTS: (Who and What was discussed)  None    Chronic Conditions: Anxiety, GERD    Social Determinants affecting Dx or Tx: None    Records Reviewed (source and summary of external records): Prior medical records    CC/HPI Summary, DDx, ED Course, and Reassessment: This is a 70-year-old female who presents with periumbilical abdominal pain that woke her from sleep at 2:00 this morning with associated watery, nonbloody diarrhea and 1 episode of vomiting prior to the pain starting. Differential diagnosis includes gastroenteritis, foodborne illness, pancreatitis, appendicitis, GERD. Patient is afebrile with stable vital signs. She has tenderness to palpation in the periumbilical region without peritoneal signs. We will check labs, CT abdomen pelvis for further evaluation and treat symptomatically. ED Course as of 03/10/23 2246   Fri Mar 10, 2023   1404 After my initial assessment, had plan to obtain CT but the patient declined. [PATRICK]      ED Course User Index  [PATRICK] Erika Merida, [address]     Labs are reviewed. CBC shows no leukocytosis. Chemistry reveals mildly elevated bilirubin of 1.8, alkaline phosphatase of 174, AST of 102, and ALT of 115. Lipase is not elevated.   Urinalysis shows no evidence of infection. Given mildly elevated bilirubin and LFTs, right upper quadrant ultrasound was obtained which shows cholelithiasis without evidence of acute cholecystitis. Discussed case with Dr. Edin Valverde, supervising physician. Given patient has no right upper quadrant tenderness, he advises that elevated bilirubin and LFTs are likely secondary to acute gastroenteritis and not related to cholelithiasis. The patient felt improved on recheck. Discussed symptomatic treatment. Will give general surgery referral for further evaluation and management of cholelithiasis. Discussed return precautions. Disposition Considerations (Tests not done, Shared Decision Making, Pt Expectation of Test or Tx.):      FINAL IMPRESSION     1. Gastroenteritis    2. Calculus of gallbladder without cholecystitis without obstruction          DISPOSITION/PLAN   Discharged    Discharge Note: The patient is stable for discharge home. The signs, symptoms, diagnosis, and discharge instructions have been discussed, understanding conveyed, and agreed upon. The patient is to follow up as recommended or return to ER should their symptoms worsen.       PATIENT REFERRED TO:  Follow-up Information       Follow up With Specialties Details Why Contact Info    Josemanuel Ash MD General Surgery, Surgery General, Colon and Rectal Surgery, Endocrinology Physician Call  to schedule a follow up [facility] [place] [address] [address]  [phone]      [facility] EMERGENCY DEPT Emergency Medicine Go to  If symptoms worsen [address] [place] [address]  [phone]              DISCHARGE MEDICATIONS:  Discharge Medication List as of 3/10/2023  3:09 PM        START taking these medications    Details   ondansetron (ZOFRAN ODT) 4 mg disintegrating tablet Take 1 Tablet by mouth every eight (8) hours as needed for Nausea or Vomiting., Normal, Disp-20 Tablet, R-0      dicyclomine (BENTYL) 20 mg tablet Take 1 Tablet by mouth every six (6) hours as needed for Abdominal Cramps., Normal, Disp-20 Tablet, R-0           CONTINUE these medications which have NOT CHANGED    Details   norethindrone-ethinyl estradiol (JUNEL FE 1/20) 1 mg-20 mcg (21)/75 mg (7) tab Take 1 Tablet by mouth daily. , Normal, Disp-3 Dose Pack, R-3      pantoprazole (PROTONIX) 40 mg tablet TAKE 1 TABLET BY MOUTH EVERY DAY, Normal, Disp-30 Tablet, R-1      medroxyPROGESTERone (DEPO-PROVERA) 150 mg/mL syrg INJECT 1ML BY INTRAMUSCULAR ONCE FOR 1 DOSE., No Print, Disp-1 Each, R-4               DISCONTINUED MEDICATIONS:  Discharge Medication List as of 3/10/2023  3:09 PM          ED Attending Involvement : I have seen and evaluated the patient. My supervision physician was available for consultation. I am the Primary Clinician of Record. RICHELLE Geiger (electronically signed)    (Please note that parts of this dictation were completed with voice recognition software. Quite often unanticipated grammatical, syntax, homophones, and other interpretive errors are inadvertently transcribed by the computer software. Please disregards these errors.  Please excuse any errors that have escaped final proofreading.)

## 2023-03-14 DIAGNOSIS — K21.9 GASTROESOPHAGEAL REFLUX DISEASE, UNSPECIFIED WHETHER ESOPHAGITIS PRESENT: ICD-10-CM

## 2023-03-16 DIAGNOSIS — K21.9 GASTROESOPHAGEAL REFLUX DISEASE, UNSPECIFIED WHETHER ESOPHAGITIS PRESENT: ICD-10-CM

## 2023-03-19 RX ORDER — PANTOPRAZOLE SODIUM 40 MG/1
40 TABLET, DELAYED RELEASE ORAL DAILY
Qty: 30 TABLET | Refills: 0 | Status: SHIPPED | OUTPATIENT
Start: 2023-03-19

## 2023-03-19 RX ORDER — PANTOPRAZOLE SODIUM 40 MG/1
TABLET, DELAYED RELEASE ORAL
Qty: 30 TABLET | Refills: 1 | Status: SHIPPED | OUTPATIENT
Start: 2023-03-19 | End: 2023-03-19 | Stop reason: SDUPTHER

## 2023-03-21 ENCOUNTER — VIRTUAL VISIT (OUTPATIENT)
Dept: FAMILY MEDICINE CLINIC | Age: 28
End: 2023-03-21
Payer: MEDICAID

## 2023-03-21 DIAGNOSIS — R10.84 GENERALIZED ABDOMINAL PAIN: ICD-10-CM

## 2023-03-21 DIAGNOSIS — K81.9 CHOLECYSTITIS: Primary | ICD-10-CM

## 2023-03-21 PROCEDURE — 99213 OFFICE O/P EST LOW 20 MIN: CPT | Performed by: FAMILY MEDICINE

## 2023-03-21 NOTE — PROGRESS NOTES
Chief Complaint   Patient presents with    Abdominal Pain     Follow up from ER- pain today a 04/10  Patient states it was confirmed she has gallstones. Is taking the prescribed medication.

## 2023-03-21 NOTE — PROGRESS NOTES
Melly Pillai is a 32 y.o. female who was seen by synchronous (real-time) audio-video technology on 3/21/2023. Assessment & Plan:   Diagnoses and all orders for this visit:    1. Cholecystitis  -     REFERRAL TO GENERAL SURGERY    2. Generalized abdominal pain      Surgery referral  Low fat diet  Continue current plans, may add Maalox prn    Follow-up and Dispositions    Return if symptoms worsen or fail to improve. Reviewed plan of care. Patient has provided input and agrees with goals. CPT Codes 91967-56484 for Established Patients may apply to this Telehealth Visit      Subjective:   Melly Pillai was seen for Abdominal Pain (Follow up from ER- pain today a 04/10/Patient states it was confirmed she has gallstones. Is taking the prescribed medication. )      Patient presents with:  Abdominal Pain: Follow up from ER- pain today a 04/10  Patient states it was confirmed she has gallstones. Is taking the prescribed medication. She was seen on the 10th and is a little better. The ER gave her Protonix, which seems to help. It was recommended that she see surgery, but she does not want to. Review of Systems   Respiratory:  Negative for shortness of breath. Cardiovascular:  Negative for chest pain. Gastrointestinal:  Negative for abdominal pain, heartburn, nausea and vomiting. Neurological:  Negative for dizziness. Objective:     Physical Exam  Constitutional:       General: She is not in acute distress. Appearance: Normal appearance. She is obese. Neurological:      Mental Status: She is alert and oriented to person, place, and time. Due to this being a TeleHealth evaluation, many elements of the physical examination are unable to be assessed. We discussed the expected course, resolution and complications of the diagnosis(es) in detail. Medication risks, benefits, costs, interactions, and alternatives were discussed as indicated.   I advised her to contact the office if her condition worsens, changes or fails to improve as anticipated. She expressed understanding with the diagnosis(es) and plan. Pursuant to the emergency declaration under the [facility] waiver authority and the [employer] Act, this Virtual  Visit was conducted, with patient's consent, to reduce the patient's risk of exposure to COVID-19 and provide continuity of care for an established patient. Services were provided through a video synchronous discussion virtually to substitute for in-person clinic visit.     Filipe Lala MD

## 2023-03-27 ENCOUNTER — OFFICE VISIT (OUTPATIENT)
Dept: OBGYN CLINIC | Age: 28
End: 2023-03-27

## 2023-03-27 VITALS
HEART RATE: 86 BPM | BODY MASS INDEX: 36.11 KG/M2 | SYSTOLIC BLOOD PRESSURE: 121 MMHG | WEIGHT: 217 LBS | DIASTOLIC BLOOD PRESSURE: 78 MMHG

## 2023-03-27 DIAGNOSIS — R93.89 ABNORMAL GENITOURINARY ULTRASOUND: Primary | ICD-10-CM

## 2023-03-27 DIAGNOSIS — Q51.9 UTERINE ANOMALY: ICD-10-CM

## 2023-03-27 DIAGNOSIS — R10.9 ABDOMINAL PAIN IN FEMALE: ICD-10-CM

## 2023-03-27 PROCEDURE — 99212 OFFICE O/P EST SF 10 MIN: CPT | Performed by: OBSTETRICS & GYNECOLOGY

## 2023-03-27 NOTE — PROGRESS NOTES
[address] [facility]  [email or web address]  [phone]    Kaitlin Gross MD, [address] [facility]       OB/GYN Problem visit      HPI  Leela Toro is a , 32 y.o. female who presents for a problem visit. Chief Complaint   Patient presents with    Ultrasound    Abdominal Pain       Pt co upper abd pain x 1 week. Seen in er: dxd w gallstones      Per Rooming Note:    The patient is reporting having: Pt had 3 days of bleeding before her grandmothers memorial (around 3/22/23), pt reports starting new pill pack on Monday 3/20/23- this is pt's 2nd pill pack since starting OCP. Pt reports abdominal pain in the middle of her stomach for months, the ER said she had gallstones - pt reports her PCP rec her to see a surgeon     She declines STD testing. This is a new problem. She has experienced this problem before. She reports the symptoms are is unchanged. Aggravating factors include none. And alleviating factors include none. She does not have other concerns. TV ULTRASOUND PERFORMED. THE UTERUS APPEARS ARCUATE. UTERUS IS ANTEVERTED, NORMAL IN SIZE AND ECHOGENICITY. ENDOMETRIUM MEASURES 4-5MM IN THICKNESS. NO EVIDENCE OF MASSES OR ABNORMALITIES ARE SEEN. RIGHT OVARY APPEARS WITHIN NORMAL LIMITS. LEFT OVARY APPEARS WITHIN NORMAL LIMITS. NO FREE FLUID SEEN IN THE CDS. Sexual history and Contraception:  Social History     Substance and Sexual Activity   Sexual Activity Yes    Birth control/protection: None       Past Medical History:   Diagnosis Date    Depression     Anxiety    Encounter for Papanicolaou smear for cervical cancer screening 2021    neg/hpv not tested    LANCE (generalized anxiety disorder)     GERD (gastroesophageal reflux disease)     Hearing loss, left     Learning disability     reading    Obesity (BMI 35.0-39.9 without comorbidity)      Current Outpatient Medications   Medication Sig    pantoprazole (PROTONIX) 40 mg tablet Take 1 Tablet by mouth daily. ondansetron (ZOFRAN ODT) 4 mg disintegrating tablet Take 1 Tablet by mouth every eight (8) hours as needed for Nausea or Vomiting. dicyclomine (BENTYL) 20 mg tablet Take 1 Tablet by mouth every six (6) hours as needed for Abdominal Cramps. norethindrone-ethinyl estradiol (JUNEL FE 1/20) 1 mg-20 mcg (21)/75 mg (7) tab Take 1 Tablet by mouth daily. aluminum-magnesium hydroxide (MAALOX) 200-200 mg/5 mL suspension Take 15 mL by mouth every six (6) hours as needed for Indigestion. (Patient not taking: Reported on 3/27/2023)     No current facility-administered medications for this visit.      OB History    Para Term  AB Living   1 1 1 0 0 1   SAB IAB Ectopic Molar Multiple Live Births   0 0 0 0 0 1      # Outcome Date GA Lbr Yunior/2nd Weight Sex Delivery Anes PTL Lv   1 Term 06/15/22 39w2d  6 lb 8.8 oz (2.97 kg) F Vag-Forceps EPI N ABDOULAYE     Past Surgical History:   Procedure Laterality Date    HX OTHER SURGICAL  2019    wisdom teeth     Family History   Problem Relation Age of Onset    No Known Problems Mother     No Known Problems Father     Diabetes Paternal Grandmother     No Known Problems Half-sister     No Known Problems Half-brother     No Known Problems Half-brother      Social History     Socioeconomic History    Marital status:      Spouse name: Not on file    Number of children: Not on file    Years of education: Not on file    Highest education level: Not on file   Occupational History    Not on file   Tobacco Use    Smoking status: Never    Smokeless tobacco: Never   Vaping Use    Vaping Use: Never used   Substance and Sexual Activity    Alcohol use: Never    Drug use: Never    Sexual activity: Yes     Birth control/protection: None   Other Topics Concern     Service Not Asked    Blood Transfusions Not Asked    Caffeine Concern Not Asked    Occupational Exposure Not Asked    Hobby Hazards Not Asked    Sleep Concern Not Asked    Stress Concern Not Asked    Weight Concern Not Asked    Special Diet Not Asked    Back Care Not Asked    Exercise Not Asked    Bike Helmet Not Asked    Seat Belt Not Asked    Self-Exams Not Asked   Social History Narrative    Not on file     Social Determinants of Health     Financial Resource Strain: Not on file   Food Insecurity: Not on file   Transportation Needs: Not on file   Physical Activity: Not on file   Stress: Not on file   Social Connections: Not on file   Intimate Partner Violence: Not on file   Housing Stability: Not on file     Tobacco History:  reports that she has never smoked. She has never used smokeless tobacco.  Alcohol Abuse:  reports no history of alcohol use. Drug Abuse:  reports no history of drug use. Allergies   Allergen Reactions    Amoxicillin Hives    Grass Pollen Hives and Rash    Kiwi Hives    Penicillins Hives       Patient Active Problem List   Diagnosis Code    Depression F32. A    Learning disability F81.9    Hearing impairment H91.90    GERD (gastroesophageal reflux disease) K21.9    Encounter for induction of labor Z34.90    Epigastric pain R10.13             Review of Systems: History obtained from the patient  Constitutional: negative for weight loss, fever, night sweats  Breast: negative for breast lumps, nipple discharge, galactorrhea  GI: negative for change in bowel habits, abdominal pain, black or bloody stools  : see HPI   MSK: negative for back pain, joint pain, muscle pain  Skin: negative for itching, rash, hives  Psych: negative for anxiety, depression, change in mood      Objective:  Visit Vitals  /78   Pulse 86   Wt 217 lb (98.4 kg)   LMP 03/22/2023 (Approximate)   Breastfeeding No   BMI 36.11 kg/m²       PHYSICAL EXAMINATION:  GENERAL: alert, well appearing, and in no distress  HEAD: normocephalic, atraumatic.    ABDOMEN: soft, nontender, nondistended, no masses or organomegaly   EGBUS: no lesions, no inflammation, no masses  VULVA: normal appearing vulva with no masses, tenderness or lesions  VAGINA: non tender  CERVIX: non tender  UTERUS: uterus is normal size, shape, consistency and nontender   ADNEXA: normal adnexa in size, nontender and no masses  NEURO: alert, grossly oriented to place and time, normal speech,       ASSESSMENT:    ICD-10-CM ICD-9-CM    1. Abnormal genitourinary ultrasound  R93.89 793.5       2. Uterine anomaly  Q51.9 752.39       3. Abdominal pain in female  R10.9 789.00       gallstones    PLAN:  No orders of the defined types were placed in this encounter. The patient was instructed to follow up as needed if symptoms persist or worsen. Instructions were given to patient and the patient was given the opportunity to ask any questions concerning the visit today. The patient should keep/make her routine well woman exam.  The patient should contact our office with any questions or concerns  Had us in past,normal kidneys. GI fu prn  Continue OCP. We discussed potential causes of lower abdominal/pelvic pain: GYN, GI, , musculoskeletal, infectious process, adhesions, or other etiology  We discussed evaluation of lower abdominal/pelvic pain: including but not limited to observation, surgical evaluation/laparoscopy, imaging   We discussed treatment of lower abdominal/pelvic pain: including but not limited to: pain medication, hormonal management, surgical intervention, bowel regimen. Since pain can be a symptoms of an underlying abnormal process she is encouraged to contact my office with persistent symptoms for additional evaluation and treatment if needed. Disc risk factors for uter anomaly and pregnancy complications, plan observation. Today, 03/27/23, I personally spent more than 15 minutes in review of records, documentation,  and face to face counseling with the patient discussing the diagnosis, plan of care and importance of compliance with the treatment plan and performing an exam as well as ordering any appropropriate labs, procedures, or medications.      No results found for this visit on 03/27/23.

## 2023-03-27 NOTE — PROGRESS NOTES
Rooming note, gyn problem visit:    Chief Complaint   Patient presents with    Ultrasound    Abdominal Pain     Tay Frazier is a 32 y.o. female presents for a problem visit. Patient's last menstrual period was 03/22/2023 (approximate). Birth Control: none. Last Pap: approximate date 12/2021 and was normal  Last or next [address] is: 10/2022    The patient is reporting having: Pt had 3 days of bleeding before her grandmothers Tere Avitia (around 3/22/23), pt reports starting new pill pack on Monday 3/20/23- this is pt's 2nd pill pack since starting OCP. Pt reports abdominal pain in the middle of her stomach for months, the ER said she had gallstones - pt reports her PCP rec her to see a surgeon       This is a new problem. She has experienced this problem before. She reports the symptoms are is unchanged. Aggravating factors include none. And alleviating factors include none. She does not have other concerns. TV ULTRASOUND PERFORMED. THE UTERUS APPEARS ARCUATE. UTERUS IS ANTEVERTED, NORMAL IN SIZE AND ECHOGENICITY. ENDOMETRIUM MEASURES 4-5MM IN THICKNESS. NO EVIDENCE OF MASSES OR ABNORMALITIES ARE SEEN. RIGHT OVARY APPEARS WITHIN NORMAL LIMITS. LEFT OVARY APPEARS WITHIN NORMAL LIMITS. NO FREE FLUID SEEN IN THE CDS.

## 2023-03-28 ENCOUNTER — TELEPHONE (OUTPATIENT)
Dept: OBGYN CLINIC | Age: 28
End: 2023-03-28

## 2023-03-28 NOTE — TELEPHONE ENCOUNTER
32year old patient last seen in the office on 4/10/2023 for  6 month follow up and mood check    ?  Ok to refill has pended from the pharmacy    Please amend/sign  Thank you

## 2023-03-30 RX ORDER — FLUOXETINE HYDROCHLORIDE 40 MG/1
40 CAPSULE ORAL DAILY
Qty: 30 CAPSULE | Refills: 0 | Status: SHIPPED | OUTPATIENT
Start: 2023-03-30 | End: 2023-04-29

## 2023-03-30 NOTE — TELEPHONE ENCOUNTER
Patient seen in the office on 3/27/2023 and has mood follow up on 4/10/2023 for mood follow up    Sorry for the confusion    Please advise    Thank you

## 2023-04-24 ENCOUNTER — OFFICE VISIT (OUTPATIENT)
Dept: OBGYN CLINIC | Age: 28
End: 2023-04-24
Payer: MEDICAID

## 2023-04-24 VITALS
SYSTOLIC BLOOD PRESSURE: 120 MMHG | BODY MASS INDEX: 35.65 KG/M2 | HEIGHT: 65 IN | HEART RATE: 96 BPM | DIASTOLIC BLOOD PRESSURE: 77 MMHG | WEIGHT: 214 LBS

## 2023-04-24 DIAGNOSIS — Z76.89 ENCOUNTER FOR MENSTRUAL REGULATION: Primary | ICD-10-CM

## 2023-04-24 PROCEDURE — 99213 OFFICE O/P EST LOW 20 MIN: CPT | Performed by: OBSTETRICS & GYNECOLOGY

## 2023-04-24 RX ORDER — FLUOXETINE HYDROCHLORIDE 40 MG/1
40 CAPSULE ORAL DAILY
Qty: 90 CAPSULE | Refills: 1 | Status: SHIPPED | OUTPATIENT
Start: 2023-04-24 | End: 2023-05-24

## 2023-04-24 RX ORDER — NORETHINDRONE ACETATE AND ETHINYL ESTRADIOL 1MG-20(21)
1 KIT ORAL DAILY
Qty: 3 DOSE PACK | Refills: 3 | Status: CANCELLED | OUTPATIENT
Start: 2023-04-24

## 2023-04-24 NOTE — PROGRESS NOTES
[facility]  [email or web address]  [phone]    Fabiola Romeo MD, [facility]       OB/GYN Problem visit      HPI  Rocco Phalen is a , 32 y.o. female who presents for a problem visit. Chief Complaint   Patient presents with    Follow-up    Medication Check       Here for mood follow up. Feels good on 40 mg prozac. MGM  in February but coping ok. Happy with ocp, regular cycles. Per Rooming Note:     Patient's last menstrual period was 2023 (approximate). Birth Control: OCP (estrogen/progesterone). Last Pap: approximate date 2021 and was normal  Last or next WWE is: 10/10/22     The patient is here for fu for prozac 40mg, pt happy with dose. 10/10/22 EPDS was 9. Happy with OCP. She does not have other concerns. Gallstone pain improved. Sexual history and Contraception:  Social History     Substance and Sexual Activity   Sexual Activity Yes    Birth control/protection: None       Past Medical History:   Diagnosis Date    Depression     Anxiety    Encounter for Papanicolaou smear for cervical cancer screening 2021    neg/hpv not tested    LANCE (generalized anxiety disorder)     GERD (gastroesophageal reflux disease)     Hearing loss, left     Learning disability     reading    Obesity (BMI 35.0-39.9 without comorbidity)      Current Outpatient Medications   Medication Sig    FLUoxetine (PROzac) 40 mg capsule Take 1 Capsule by mouth daily for 30 days. pantoprazole (PROTONIX) 40 mg tablet Take 1 Tablet by mouth daily. norethindrone-ethinyl estradiol (JUNEL FE 1/20) 1 mg-20 mcg (21)/75 mg (7) tab Take 1 Tablet by mouth daily. No current facility-administered medications for this visit.      OB History    Para Term  AB Living   1 1 1 0 0 1   SAB IAB Ectopic Molar Multiple Live Births   0 0 0 0 0 1      # Outcome Date GA Lbr Yunior/2nd Weight Sex Delivery Anes PTL Lv   1 Term 06/15/22 39w2d  6 lb 8.8 oz (2.97 kg) F Vag-Forceps EPI N ABDOULAYE     Past Surgical History:   Procedure Laterality Date    HX OTHER SURGICAL  2019    wisdom teeth     Family History   Problem Relation Age of Onset    No Known Problems Mother     No Known Problems Father     Diabetes Paternal Grandmother     No Known Problems Half-sister     No Known Problems Half-brother     No Known Problems Half-brother      Social History     Socioeconomic History    Marital status:      Spouse name: Not on file    Number of children: Not on file    Years of education: Not on file    Highest education level: Not on file   Occupational History    Not on file   Tobacco Use    Smoking status: Never    Smokeless tobacco: Never   Vaping Use    Vaping Use: Never used   Substance and Sexual Activity    Alcohol use: Never    Drug use: Never    Sexual activity: Yes     Birth control/protection: None   Other Topics Concern     Service Not Asked    Blood Transfusions Not Asked    Caffeine Concern Not Asked    Occupational Exposure Not Asked    Hobby Hazards Not Asked    Sleep Concern Not Asked    Stress Concern Not Asked    Weight Concern Not Asked    Special Diet Not Asked    Back Care Not Asked    Exercise Not Asked    Bike Helmet Not Asked    Seat Belt Not Asked    Self-Exams Not Asked   Social History Narrative    Not on file     Social Determinants of Health     Financial Resource Strain: Not on file   Food Insecurity: Not on file   Transportation Needs: Not on file   Physical Activity: Not on file   Stress: Not on file   Social Connections: Not on file   Intimate Partner Violence: Not on file   Housing Stability: Not on file     Tobacco History:  reports that she has never smoked. She has never used smokeless tobacco.  Alcohol Abuse:  reports no history of alcohol use. Drug Abuse:  reports no history of drug use.   Allergies   Allergen Reactions    Amoxicillin Hives    Grass Pollen Hives and Rash    Kiwi Hives    Penicillins Hives       Patient Active Problem List Diagnosis Code    Depression F32. A    Learning disability F81.9    Hearing impairment H91.90    GERD (gastroesophageal reflux disease) K21.9    Encounter for induction of labor Z34.90    Epigastric pain R10.13             Review of Systems: History obtained from the patient  Constitutional: negative for weight loss, fever, night sweats, see HPI  Breast: negative for breast lumps, nipple discharge, galactorrhea  GI: negative for change in bowel habits, abdominal pain, black or bloody stools  : see HPI   MSK: negative for back pain, joint pain, muscle pain  Skin: negative for itching, rash, hives  Psych: negative for anxiety, depression, change in mood      Objective:  Visit Vitals  /77   Pulse 96   Ht 5' 5\" (1.651 m)   Wt 214 lb (97.1 kg)   LMP 03/22/2023 (Approximate)   Breastfeeding No   BMI 35.61 kg/m²       PHYSICAL EXAMINATION:  GENERAL: alert, well appearing, and in no distress  HEAD: normocephalic, atraumatic. NEURO: alert, grossly oriented to place and time, normal speech,       ASSESSMENT:    ICD-10-CM ICD-9-CM    1. Encounter for menstrual regulation  Z76.89 V25.3       2. Postpartum depression  F53.0 648.44      311           PLAN:  Orders Placed This Encounter    FLUoxetine (PROzac) 40 mg capsule     Sig: Take 1 Capsule by mouth daily for 30 days. Dispense:  90 Capsule     Refill:  1       The patient was instructed to follow up as needed if symptoms persist or worsen. Instructions were given to patient and the patient was given the opportunity to ask any questions concerning the visit today. The patient should keep/make her routine well woman exam.  The patient should contact our office with any questions or concerns. Rec pp support group/counseling  Continue current dose of prozac, pt does not want to adjust despite EPDS score, feels better  Mood fu 6mos/w wwe  Refill ocp until wwe    No results found for this visit on 04/24/23.

## 2023-04-24 NOTE — PROGRESS NOTES
Rooming note, Ob/Gyn follow up visit. Chief Complaint   Patient presents with    Follow-up    Medication Check     Destinee Guerra is a 32 y.o. female presents for a follow up visit. Patient's last menstrual period was 03/22/2023 (approximate). Birth Control: OCP (estrogen/progesterone). Last Pap: approximate date 12/2021 and was normal  Last or next WWE is: 10/10/22    The patient is here for fu for prozac 40mg, pt happy with dose. 10/10/22 EPDS was 9. Happy with OCP. She does not have other concerns. Gallstone pain improved.

## 2023-04-26 ENCOUNTER — APPOINTMENT (OUTPATIENT)
Dept: ULTRASOUND IMAGING | Age: 28
End: 2023-04-26
Attending: STUDENT IN AN ORGANIZED HEALTH CARE EDUCATION/TRAINING PROGRAM
Payer: MEDICAID

## 2023-04-26 ENCOUNTER — APPOINTMENT (OUTPATIENT)
Dept: GENERAL RADIOLOGY | Age: 28
End: 2023-04-26
Attending: SURGERY
Payer: MEDICAID

## 2023-04-26 ENCOUNTER — ANESTHESIA EVENT (OUTPATIENT)
Dept: SURGERY | Age: 28
End: 2023-04-26
Payer: MEDICAID

## 2023-04-26 ENCOUNTER — ANESTHESIA (OUTPATIENT)
Dept: SURGERY | Age: 28
End: 2023-04-26
Payer: MEDICAID

## 2023-04-26 ENCOUNTER — HOSPITAL ENCOUNTER (OUTPATIENT)
Age: 28
Setting detail: OBSERVATION
Discharge: HOME | End: 2023-04-28
Attending: STUDENT IN AN ORGANIZED HEALTH CARE EDUCATION/TRAINING PROGRAM | Admitting: SURGERY
Payer: MEDICAID

## 2023-04-26 DIAGNOSIS — K81.9 CHOLECYSTITIS: Primary | ICD-10-CM

## 2023-04-26 PROBLEM — K81.0 ACUTE CHOLECYSTITIS: Status: ACTIVE | Noted: 2023-04-26

## 2023-04-26 LAB
ALBUMIN SERPL-MCNC: 3.6 G/DL (ref 3.5–5)
ALBUMIN/GLOB SERPL: 0.9 (ref 1.1–2.2)
ALP SERPL-CCNC: 192 U/L (ref 45–117)
ALT SERPL-CCNC: 153 U/L (ref 12–78)
ANION GAP SERPL CALC-SCNC: 8 MMOL/L (ref 5–15)
APPEARANCE UR: CLEAR
AST SERPL-CCNC: 140 U/L (ref 15–37)
BACTERIA URNS QL MICRO: ABNORMAL /HPF
BASOPHILS # BLD: 0 K/UL (ref 0–0.1)
BASOPHILS NFR BLD: 0 % (ref 0–1)
BILIRUB SERPL-MCNC: 2.1 MG/DL (ref 0.2–1)
BILIRUB UR QL: NEGATIVE
BUN SERPL-MCNC: 8 MG/DL (ref 6–20)
BUN/CREAT SERPL: 10 (ref 12–20)
CALCIUM SERPL-MCNC: 9.1 MG/DL (ref 8.5–10.1)
CHLORIDE SERPL-SCNC: 108 MMOL/L (ref 97–108)
CO2 SERPL-SCNC: 22 MMOL/L (ref 21–32)
COLOR UR: ABNORMAL
CREAT SERPL-MCNC: 0.83 MG/DL (ref 0.55–1.02)
DIFFERENTIAL METHOD BLD: ABNORMAL
EOSINOPHIL # BLD: 0 K/UL (ref 0–0.4)
EOSINOPHIL NFR BLD: 0 % (ref 0–7)
EPITH CASTS URNS QL MICRO: ABNORMAL /LPF
ERYTHROCYTE [DISTWIDTH] IN BLOOD BY AUTOMATED COUNT: 13.4 % (ref 11.5–14.5)
GLOBULIN SER CALC-MCNC: 4.2 G/DL (ref 2–4)
GLUCOSE SERPL-MCNC: 108 MG/DL (ref 65–100)
GLUCOSE UR STRIP.AUTO-MCNC: NEGATIVE MG/DL
HCG UR QL: NEGATIVE
HCG UR QL: NEGATIVE
HCT VFR BLD AUTO: 41.6 % (ref 35–47)
HGB BLD-MCNC: 14.1 G/DL (ref 11.5–16)
HGB UR QL STRIP: NEGATIVE
HYALINE CASTS URNS QL MICRO: ABNORMAL /LPF (ref 0–2)
IMM GRANULOCYTES # BLD AUTO: 0 K/UL (ref 0–0.04)
IMM GRANULOCYTES NFR BLD AUTO: 0 % (ref 0–0.5)
KETONES UR QL STRIP.AUTO: 15 MG/DL
LEUKOCYTE ESTERASE UR QL STRIP.AUTO: ABNORMAL
LIPASE SERPL-CCNC: 173 U/L (ref 73–393)
LYMPHOCYTES # BLD: 2.2 K/UL (ref 0.8–3.5)
LYMPHOCYTES NFR BLD: 26 % (ref 12–49)
MCH RBC QN AUTO: 29 PG (ref 26–34)
MCHC RBC AUTO-ENTMCNC: 33.9 G/DL (ref 30–36.5)
MCV RBC AUTO: 85.4 FL (ref 80–99)
MONOCYTES # BLD: 0.4 K/UL (ref 0–1)
MONOCYTES NFR BLD: 4 % (ref 5–13)
NEUTS SEG # BLD: 5.6 K/UL (ref 1.8–8)
NEUTS SEG NFR BLD: 70 % (ref 32–75)
NITRITE UR QL STRIP.AUTO: NEGATIVE
NRBC # BLD: 0 K/UL (ref 0–0.01)
NRBC BLD-RTO: 0 PER 100 WBC
PH UR STRIP: 8 (ref 5–8)
PLATELET # BLD AUTO: 275 K/UL (ref 150–400)
PMV BLD AUTO: 10.3 FL (ref 8.9–12.9)
POTASSIUM SERPL-SCNC: 4.1 MMOL/L (ref 3.5–5.1)
PROT SERPL-MCNC: 7.8 G/DL (ref 6.4–8.2)
PROT UR STRIP-MCNC: NEGATIVE MG/DL
RBC # BLD AUTO: 4.87 M/UL (ref 3.8–5.2)
RBC #/AREA URNS HPF: ABNORMAL /HPF (ref 0–5)
SODIUM SERPL-SCNC: 138 MMOL/L (ref 136–145)
SP GR UR REFRACTOMETRY: 1.01
UR CULT HOLD, URHOLD: NORMAL
UROBILINOGEN UR QL STRIP.AUTO: 1 EU/DL (ref 0.2–1)
WBC # BLD AUTO: 8.2 K/UL (ref 3.6–11)
WBC URNS QL MICRO: ABNORMAL /HPF (ref 0–4)

## 2023-04-26 PROCEDURE — 74011000250 HC RX REV CODE- 250: Performed by: NURSE ANESTHETIST, CERTIFIED REGISTERED

## 2023-04-26 PROCEDURE — 77030002933 HC SUT MCRYL J&J -A: Performed by: SURGERY

## 2023-04-26 PROCEDURE — 74011250636 HC RX REV CODE- 250/636: Performed by: NURSE ANESTHETIST, CERTIFIED REGISTERED

## 2023-04-26 PROCEDURE — 74011250636 HC RX REV CODE- 250/636: Performed by: SURGERY

## 2023-04-26 PROCEDURE — 77030012770 HC TRCR OPT FX AMR -B: Performed by: SURGERY

## 2023-04-26 PROCEDURE — 77030002895 HC DEV VASC CLOSR COVD -B: Performed by: SURGERY

## 2023-04-26 PROCEDURE — 74011250636 HC RX REV CODE- 250/636: Performed by: ANESTHESIOLOGY

## 2023-04-26 PROCEDURE — 77030010507 HC ADH SKN DERMBND J&J -B: Performed by: SURGERY

## 2023-04-26 PROCEDURE — G0378 HOSPITAL OBSERVATION PER HR: HCPCS

## 2023-04-26 PROCEDURE — 83690 ASSAY OF LIPASE: CPT

## 2023-04-26 PROCEDURE — 74300 X-RAY BILE DUCTS/PANCREAS: CPT

## 2023-04-26 PROCEDURE — 76210000006 HC OR PH I REC 0.5 TO 1 HR: Performed by: SURGERY

## 2023-04-26 PROCEDURE — 77030008771 HC TU NG SALEM SUMP -A: Performed by: NURSE ANESTHETIST, CERTIFIED REGISTERED

## 2023-04-26 PROCEDURE — 77030020829: Performed by: SURGERY

## 2023-04-26 PROCEDURE — 76010000149 HC OR TIME 1 TO 1.5 HR: Performed by: SURGERY

## 2023-04-26 PROCEDURE — 77030013079 HC BLNKT BAIR HGGR 3M -A: Performed by: NURSE ANESTHETIST, CERTIFIED REGISTERED

## 2023-04-26 PROCEDURE — 96374 THER/PROPH/DIAG INJ IV PUSH: CPT

## 2023-04-26 PROCEDURE — 85025 COMPLETE CBC W/AUTO DIFF WBC: CPT

## 2023-04-26 PROCEDURE — 77030019908 HC STETH ESOPH SIMS -A: Performed by: NURSE ANESTHETIST, CERTIFIED REGISTERED

## 2023-04-26 PROCEDURE — 99221 1ST HOSP IP/OBS SF/LOW 40: CPT | Performed by: SURGERY

## 2023-04-26 PROCEDURE — 2709999900 HC NON-CHARGEABLE SUPPLY: Performed by: SURGERY

## 2023-04-26 PROCEDURE — 96375 TX/PRO/DX INJ NEW DRUG ADDON: CPT

## 2023-04-26 PROCEDURE — 81001 URINALYSIS AUTO W/SCOPE: CPT

## 2023-04-26 PROCEDURE — 77030008684 HC TU ET CUF COVD -B: Performed by: NURSE ANESTHETIST, CERTIFIED REGISTERED

## 2023-04-26 PROCEDURE — 74011000250 HC RX REV CODE- 250: Performed by: SURGERY

## 2023-04-26 PROCEDURE — 81025 URINE PREGNANCY TEST: CPT

## 2023-04-26 PROCEDURE — 36415 COLL VENOUS BLD VENIPUNCTURE: CPT

## 2023-04-26 PROCEDURE — 76060000033 HC ANESTHESIA 1 TO 1.5 HR: Performed by: SURGERY

## 2023-04-26 PROCEDURE — 77030010513 HC APPL CLP LIG J&J -C: Performed by: SURGERY

## 2023-04-26 PROCEDURE — 77030008606 HC TRCR ENDOSC KII AMR -B: Performed by: SURGERY

## 2023-04-26 PROCEDURE — 88304 TISSUE EXAM BY PATHOLOGIST: CPT

## 2023-04-26 PROCEDURE — 77030026438 HC STYL ET INTUB CARD -A: Performed by: NURSE ANESTHETIST, CERTIFIED REGISTERED

## 2023-04-26 PROCEDURE — 77030009851 HC PCH RTVR ENDOSC AMR -B: Performed by: SURGERY

## 2023-04-26 PROCEDURE — 74011250636 HC RX REV CODE- 250/636

## 2023-04-26 PROCEDURE — 99285 EMERGENCY DEPT VISIT HI MDM: CPT

## 2023-04-26 PROCEDURE — 76705 ECHO EXAM OF ABDOMEN: CPT

## 2023-04-26 PROCEDURE — 77030008756 HC TU IRR SUC STRY -B: Performed by: SURGERY

## 2023-04-26 PROCEDURE — 77030004813 HC CATH CHOLGM TELE -B: Performed by: SURGERY

## 2023-04-26 PROCEDURE — 47563 LAPARO CHOLECYSTECTOMY/GRAPH: CPT | Performed by: SURGERY

## 2023-04-26 PROCEDURE — 80053 COMPREHEN METABOLIC PANEL: CPT

## 2023-04-26 PROCEDURE — 77030009403 HC ELECTRD ENDO MEGA -B: Performed by: SURGERY

## 2023-04-26 PROCEDURE — 77030031139 HC SUT VCRL2 J&J -A: Performed by: SURGERY

## 2023-04-26 PROCEDURE — 74011250636 HC RX REV CODE- 250/636: Performed by: STUDENT IN AN ORGANIZED HEALTH CARE EDUCATION/TRAINING PROGRAM

## 2023-04-26 PROCEDURE — 74011000636 HC RX REV CODE- 636: Performed by: SURGERY

## 2023-04-26 RX ORDER — LIDOCAINE HYDROCHLORIDE 10 MG/ML
0.1 INJECTION, SOLUTION EPIDURAL; INFILTRATION; INTRACAUDAL; PERINEURAL AS NEEDED
Status: DISCONTINUED | OUTPATIENT
Start: 2023-04-26 | End: 2023-04-26 | Stop reason: HOSPADM

## 2023-04-26 RX ORDER — HYDROMORPHONE HYDROCHLORIDE 1 MG/ML
.2-.5 INJECTION, SOLUTION INTRAMUSCULAR; INTRAVENOUS; SUBCUTANEOUS
Status: DISCONTINUED | OUTPATIENT
Start: 2023-04-26 | End: 2023-04-26 | Stop reason: HOSPADM

## 2023-04-26 RX ORDER — MORPHINE SULFATE 2 MG/ML
4 INJECTION, SOLUTION INTRAMUSCULAR; INTRAVENOUS ONCE
Status: COMPLETED | OUTPATIENT
Start: 2023-04-26 | End: 2023-04-26

## 2023-04-26 RX ORDER — SODIUM CHLORIDE 0.9 % (FLUSH) 0.9 %
5-40 SYRINGE (ML) INJECTION EVERY 8 HOURS
Status: DISCONTINUED | OUTPATIENT
Start: 2023-04-26 | End: 2023-04-28 | Stop reason: HOSPADM

## 2023-04-26 RX ORDER — HYDROMORPHONE HYDROCHLORIDE 2 MG/ML
INJECTION, SOLUTION INTRAMUSCULAR; INTRAVENOUS; SUBCUTANEOUS AS NEEDED
Status: DISCONTINUED | OUTPATIENT
Start: 2023-04-26 | End: 2023-04-26 | Stop reason: HOSPADM

## 2023-04-26 RX ORDER — DEXAMETHASONE SODIUM PHOSPHATE 4 MG/ML
INJECTION, SOLUTION INTRA-ARTICULAR; INTRALESIONAL; INTRAMUSCULAR; INTRAVENOUS; SOFT TISSUE AS NEEDED
Status: DISCONTINUED | OUTPATIENT
Start: 2023-04-26 | End: 2023-04-26 | Stop reason: HOSPADM

## 2023-04-26 RX ORDER — FENTANYL CITRATE 50 UG/ML
50 INJECTION, SOLUTION INTRAMUSCULAR; INTRAVENOUS AS NEEDED
Status: DISCONTINUED | OUTPATIENT
Start: 2023-04-26 | End: 2023-04-26 | Stop reason: HOSPADM

## 2023-04-26 RX ORDER — SUCCINYLCHOLINE CHLORIDE 20 MG/ML
INJECTION INTRAMUSCULAR; INTRAVENOUS AS NEEDED
Status: DISCONTINUED | OUTPATIENT
Start: 2023-04-26 | End: 2023-04-26 | Stop reason: HOSPADM

## 2023-04-26 RX ORDER — DEXTROSE, SODIUM CHLORIDE, AND POTASSIUM CHLORIDE 5; .45; .15 G/100ML; G/100ML; G/100ML
125 INJECTION INTRAVENOUS CONTINUOUS
Status: DISCONTINUED | OUTPATIENT
Start: 2023-04-26 | End: 2023-04-28 | Stop reason: HOSPADM

## 2023-04-26 RX ORDER — OXYCODONE HYDROCHLORIDE 5 MG/1
5 TABLET ORAL
Status: DISCONTINUED | OUTPATIENT
Start: 2023-04-26 | End: 2023-04-28 | Stop reason: HOSPADM

## 2023-04-26 RX ORDER — CEFAZOLIN SODIUM/WATER 2 G/20 ML
SYRINGE (ML) INTRAVENOUS AS NEEDED
Status: DISCONTINUED | OUTPATIENT
Start: 2023-04-26 | End: 2023-04-26 | Stop reason: HOSPADM

## 2023-04-26 RX ORDER — LIDOCAINE HYDROCHLORIDE 20 MG/ML
INJECTION, SOLUTION EPIDURAL; INFILTRATION; INTRACAUDAL; PERINEURAL AS NEEDED
Status: DISCONTINUED | OUTPATIENT
Start: 2023-04-26 | End: 2023-04-26 | Stop reason: HOSPADM

## 2023-04-26 RX ORDER — FENTANYL CITRATE 50 UG/ML
INJECTION, SOLUTION INTRAMUSCULAR; INTRAVENOUS
Status: COMPLETED
Start: 2023-04-26 | End: 2023-04-26

## 2023-04-26 RX ORDER — SODIUM CHLORIDE, SODIUM LACTATE, POTASSIUM CHLORIDE, CALCIUM CHLORIDE 600; 310; 30; 20 MG/100ML; MG/100ML; MG/100ML; MG/100ML
25 INJECTION, SOLUTION INTRAVENOUS CONTINUOUS
Status: DISCONTINUED | OUTPATIENT
Start: 2023-04-26 | End: 2023-04-26 | Stop reason: HOSPADM

## 2023-04-26 RX ORDER — HYDROMORPHONE HYDROCHLORIDE 1 MG/ML
0.5 INJECTION, SOLUTION INTRAMUSCULAR; INTRAVENOUS; SUBCUTANEOUS
Status: DISCONTINUED | OUTPATIENT
Start: 2023-04-26 | End: 2023-04-28 | Stop reason: HOSPADM

## 2023-04-26 RX ORDER — ROCURONIUM BROMIDE 10 MG/ML
INJECTION, SOLUTION INTRAVENOUS AS NEEDED
Status: DISCONTINUED | OUTPATIENT
Start: 2023-04-26 | End: 2023-04-26 | Stop reason: HOSPADM

## 2023-04-26 RX ORDER — ONDANSETRON 2 MG/ML
4 INJECTION INTRAMUSCULAR; INTRAVENOUS
Status: DISCONTINUED | OUTPATIENT
Start: 2023-04-26 | End: 2023-04-28 | Stop reason: HOSPADM

## 2023-04-26 RX ORDER — ONDANSETRON 2 MG/ML
INJECTION INTRAMUSCULAR; INTRAVENOUS AS NEEDED
Status: DISCONTINUED | OUTPATIENT
Start: 2023-04-26 | End: 2023-04-26 | Stop reason: HOSPADM

## 2023-04-26 RX ORDER — DIPHENHYDRAMINE HYDROCHLORIDE 50 MG/ML
25 INJECTION, SOLUTION INTRAMUSCULAR; INTRAVENOUS
Status: DISCONTINUED | OUTPATIENT
Start: 2023-04-26 | End: 2023-04-28 | Stop reason: HOSPADM

## 2023-04-26 RX ORDER — ACETAMINOPHEN 325 MG/1
650 TABLET ORAL
Status: DISCONTINUED | OUTPATIENT
Start: 2023-04-26 | End: 2023-04-28 | Stop reason: HOSPADM

## 2023-04-26 RX ORDER — FENTANYL CITRATE 50 UG/ML
25 INJECTION, SOLUTION INTRAMUSCULAR; INTRAVENOUS
Status: DISCONTINUED | OUTPATIENT
Start: 2023-04-26 | End: 2023-04-26 | Stop reason: HOSPADM

## 2023-04-26 RX ORDER — BUPIVACAINE HYDROCHLORIDE AND EPINEPHRINE 5; 5 MG/ML; UG/ML
INJECTION, SOLUTION PERINEURAL AS NEEDED
Status: DISCONTINUED | OUTPATIENT
Start: 2023-04-26 | End: 2023-04-26 | Stop reason: HOSPADM

## 2023-04-26 RX ORDER — FENTANYL CITRATE 50 UG/ML
INJECTION, SOLUTION INTRAMUSCULAR; INTRAVENOUS AS NEEDED
Status: DISCONTINUED | OUTPATIENT
Start: 2023-04-26 | End: 2023-04-26 | Stop reason: HOSPADM

## 2023-04-26 RX ORDER — GLYCOPYRROLATE 0.2 MG/ML
INJECTION INTRAMUSCULAR; INTRAVENOUS AS NEEDED
Status: DISCONTINUED | OUTPATIENT
Start: 2023-04-26 | End: 2023-04-26 | Stop reason: HOSPADM

## 2023-04-26 RX ORDER — ONDANSETRON 2 MG/ML
4 INJECTION INTRAMUSCULAR; INTRAVENOUS AS NEEDED
Status: DISCONTINUED | OUTPATIENT
Start: 2023-04-26 | End: 2023-04-26 | Stop reason: HOSPADM

## 2023-04-26 RX ORDER — PROPOFOL 10 MG/ML
INJECTION, EMULSION INTRAVENOUS AS NEEDED
Status: DISCONTINUED | OUTPATIENT
Start: 2023-04-26 | End: 2023-04-26 | Stop reason: HOSPADM

## 2023-04-26 RX ORDER — MIDAZOLAM HYDROCHLORIDE 1 MG/ML
INJECTION, SOLUTION INTRAMUSCULAR; INTRAVENOUS AS NEEDED
Status: DISCONTINUED | OUTPATIENT
Start: 2023-04-26 | End: 2023-04-26 | Stop reason: HOSPADM

## 2023-04-26 RX ORDER — MORPHINE SULFATE 2 MG/ML
4 INJECTION, SOLUTION INTRAMUSCULAR; INTRAVENOUS
Status: DISCONTINUED | OUTPATIENT
Start: 2023-04-26 | End: 2023-04-28 | Stop reason: HOSPADM

## 2023-04-26 RX ORDER — PANTOPRAZOLE SODIUM 40 MG/1
40 TABLET, DELAYED RELEASE ORAL DAILY
Status: DISCONTINUED | OUTPATIENT
Start: 2023-04-27 | End: 2023-04-28 | Stop reason: HOSPADM

## 2023-04-26 RX ORDER — SODIUM CHLORIDE 0.9 % (FLUSH) 0.9 %
5-40 SYRINGE (ML) INJECTION AS NEEDED
Status: DISCONTINUED | OUTPATIENT
Start: 2023-04-26 | End: 2023-04-28 | Stop reason: HOSPADM

## 2023-04-26 RX ORDER — LORAZEPAM 2 MG/ML
1 INJECTION INTRAMUSCULAR
Status: DISCONTINUED | OUTPATIENT
Start: 2023-04-26 | End: 2023-04-28 | Stop reason: HOSPADM

## 2023-04-26 RX ORDER — ONDANSETRON 2 MG/ML
4 INJECTION INTRAMUSCULAR; INTRAVENOUS ONCE
Status: COMPLETED | OUTPATIENT
Start: 2023-04-26 | End: 2023-04-26

## 2023-04-26 RX ORDER — NALOXONE HYDROCHLORIDE 0.4 MG/ML
0.4 INJECTION, SOLUTION INTRAMUSCULAR; INTRAVENOUS; SUBCUTANEOUS AS NEEDED
Status: DISCONTINUED | OUTPATIENT
Start: 2023-04-26 | End: 2023-04-28 | Stop reason: HOSPADM

## 2023-04-26 RX ORDER — FLUOXETINE HYDROCHLORIDE 20 MG/1
40 CAPSULE ORAL DAILY
Status: DISCONTINUED | OUTPATIENT
Start: 2023-04-27 | End: 2023-04-28 | Stop reason: HOSPADM

## 2023-04-26 RX ORDER — HYDRALAZINE HYDROCHLORIDE 20 MG/ML
10 INJECTION INTRAMUSCULAR; INTRAVENOUS
Status: DISCONTINUED | OUTPATIENT
Start: 2023-04-26 | End: 2023-04-28 | Stop reason: HOSPADM

## 2023-04-26 RX ORDER — NEOSTIGMINE METHYLSULFATE 1 MG/ML
INJECTION, SOLUTION INTRAVENOUS AS NEEDED
Status: DISCONTINUED | OUTPATIENT
Start: 2023-04-26 | End: 2023-04-26 | Stop reason: HOSPADM

## 2023-04-26 RX ORDER — MIDAZOLAM HYDROCHLORIDE 1 MG/ML
1 INJECTION, SOLUTION INTRAMUSCULAR; INTRAVENOUS AS NEEDED
Status: DISCONTINUED | OUTPATIENT
Start: 2023-04-26 | End: 2023-04-26 | Stop reason: HOSPADM

## 2023-04-26 RX ORDER — SODIUM CHLORIDE, SODIUM LACTATE, POTASSIUM CHLORIDE, CALCIUM CHLORIDE 600; 310; 30; 20 MG/100ML; MG/100ML; MG/100ML; MG/100ML
INJECTION, SOLUTION INTRAVENOUS
Status: DISCONTINUED | OUTPATIENT
Start: 2023-04-26 | End: 2023-04-26 | Stop reason: HOSPADM

## 2023-04-26 RX ADMIN — Medication 2 G: at 21:38

## 2023-04-26 RX ADMIN — GLYCOPYRROLATE 0.6 MG: 0.2 INJECTION, SOLUTION INTRAMUSCULAR; INTRAVENOUS at 22:20

## 2023-04-26 RX ADMIN — MORPHINE SULFATE 4 MG: 2 INJECTION, SOLUTION INTRAMUSCULAR; INTRAVENOUS at 13:48

## 2023-04-26 RX ADMIN — FENTANYL CITRATE 25 MCG: 50 INJECTION, SOLUTION INTRAMUSCULAR; INTRAVENOUS at 23:02

## 2023-04-26 RX ADMIN — FENTANYL CITRATE 100 MCG: 50 INJECTION, SOLUTION INTRAMUSCULAR; INTRAVENOUS at 21:32

## 2023-04-26 RX ADMIN — FENTANYL CITRATE 25 MCG: 50 INJECTION, SOLUTION INTRAMUSCULAR; INTRAVENOUS at 23:08

## 2023-04-26 RX ADMIN — SUCCINYLCHOLINE CHLORIDE 140 MG: 20 INJECTION, SOLUTION INTRAMUSCULAR; INTRAVENOUS at 21:35

## 2023-04-26 RX ADMIN — Medication 4 MG: at 22:20

## 2023-04-26 RX ADMIN — FENTANYL CITRATE 25 MCG: 50 INJECTION, SOLUTION INTRAMUSCULAR; INTRAVENOUS at 23:12

## 2023-04-26 RX ADMIN — DEXAMETHASONE SODIUM PHOSPHATE 8 MG: 4 INJECTION, SOLUTION INTRAMUSCULAR; INTRAVENOUS at 21:39

## 2023-04-26 RX ADMIN — FENTANYL CITRATE 25 MCG: 50 INJECTION, SOLUTION INTRAMUSCULAR; INTRAVENOUS at 23:18

## 2023-04-26 RX ADMIN — ONDANSETRON 4 MG: 2 INJECTION INTRAMUSCULAR; INTRAVENOUS at 13:47

## 2023-04-26 RX ADMIN — LIDOCAINE HYDROCHLORIDE 100 MG: 20 INJECTION, SOLUTION EPIDURAL; INFILTRATION; INTRACAUDAL; PERINEURAL at 21:32

## 2023-04-26 RX ADMIN — HYDROMORPHONE HYDROCHLORIDE 0.5 MG: 2 INJECTION, SOLUTION INTRAMUSCULAR; INTRAVENOUS; SUBCUTANEOUS at 22:24

## 2023-04-26 RX ADMIN — ROCURONIUM BROMIDE 10 MG: 10 INJECTION INTRAVENOUS at 21:35

## 2023-04-26 RX ADMIN — ONDANSETRON HYDROCHLORIDE 4 MG: 2 INJECTION, SOLUTION INTRAMUSCULAR; INTRAVENOUS at 22:16

## 2023-04-26 RX ADMIN — SODIUM CHLORIDE, PRESERVATIVE FREE 10 ML: 5 INJECTION INTRAVENOUS at 20:38

## 2023-04-26 RX ADMIN — PROPOFOL 200 MG: 10 INJECTION, EMULSION INTRAVENOUS at 21:35

## 2023-04-26 RX ADMIN — HYDROMORPHONE HYDROCHLORIDE 0.5 MG: 2 INJECTION, SOLUTION INTRAMUSCULAR; INTRAVENOUS; SUBCUTANEOUS at 21:40

## 2023-04-26 RX ADMIN — GLUCAGON HYDROCHLORIDE 0.5 MG: KIT at 22:04

## 2023-04-26 RX ADMIN — SODIUM CHLORIDE, PRESERVATIVE FREE 10 ML: 5 INJECTION INTRAVENOUS at 18:51

## 2023-04-26 RX ADMIN — SODIUM CHLORIDE, POTASSIUM CHLORIDE, SODIUM LACTATE AND CALCIUM CHLORIDE: 600; 310; 30; 20 INJECTION, SOLUTION INTRAVENOUS at 21:26

## 2023-04-26 RX ADMIN — ROCURONIUM BROMIDE 20 MG: 10 INJECTION INTRAVENOUS at 21:38

## 2023-04-26 RX ADMIN — POTASSIUM CHLORIDE, DEXTROSE MONOHYDRATE AND SODIUM CHLORIDE 125 ML/HR: 150; 5; 450 INJECTION, SOLUTION INTRAVENOUS at 18:30

## 2023-04-26 RX ADMIN — MIDAZOLAM HYDROCHLORIDE 2 MG: 1 INJECTION, SOLUTION INTRAMUSCULAR; INTRAVENOUS at 21:26

## 2023-04-26 RX ADMIN — GLUCAGON HYDROCHLORIDE 0.5 MG: KIT at 22:02

## 2023-04-26 NOTE — ED PROVIDER NOTES
[facility] EMERGENCY DE[facility]  EMERGENCY DEPARTMENT ENCOUNTER       Pt Name: Destinee Guerra  MRN: [id number]  [place] 1995  Date of evaluation: 4/26/2023  Provider: Morelia Knight MD   PCP: Silvia Farah MD  Note Started: 1:41 PM 4/26/23     CHIEF COMPLAINT       Chief Complaint   Patient presents with    Abdominal Pain        HISTORY OF PRESENT ILLNESS: 1 or more elements    History From: Patient  HPI Limitations : None     Destinee Guerra is a 32 y.o. female with Pmhx listed below     Patient is a 31-year-old female with history of cholelithiasis coming in for worsening right upper quadrant and epigastric abdominal pain. Patient states that she was diagnosed with this last month, states that she was sent to follow-up with her CPE and has been referred to a surgeon who she has appoint within 2 days. Patient states that yesterday after eating pizza pizza the pain returned and was severe, currently 7 out of 10 and constant, also nauseous. Patient states that she has had no fevers, chills, URI symptoms, dysuria or vaginal complaints, no other complaints at this time. Nursing Notes were all reviewed and agreed with or any disagreements were addressed in the HPI. REVIEW OF SYSTEMS      Positives and Pertinent negatives as per HPI. PAST HISTORY     Past Medical History:  Past Medical History:   Diagnosis Date    Depression     Anxiety    Encounter for Papanicolaou smear for cervical cancer screening 12/13/2021    neg/hpv not tested    LANCE (generalized anxiety disorder)     GERD (gastroesophageal reflux disease)     Hearing loss, left     Learning disability     reading    Obesity (BMI 35.0-39.9 without comorbidity)      Previous Medications    FLUOXETINE (PROZAC) 40 MG CAPSULE    Take 1 Capsule by mouth daily for 30 days. NORETHINDRONE-ETHINYL ESTRADIOL (JUNEL FE 1/20) 1 MG-20 MCG (21)/75 MG (7) TAB    Take 1 Tablet by mouth daily. PANTOPRAZOLE (PROTONIX) 40 MG TABLET    Take 1 Tablet by mouth daily. Past Surgical History:  Past Surgical History:   Procedure Laterality Date    HX OTHER SURGICAL  2019    wisdom teeth       Family History:  Family History   Problem Relation Age of Onset    No Known Problems Mother     No Known Problems Father     Diabetes Paternal Grandmother     No Known Problems Half-sister     No Known Problems Half-brother     No Known Problems Half-brother        Social History:  Social History     Tobacco Use    Smoking status: Never    Smokeless tobacco: Never   Vaping Use    Vaping Use: Never used   Substance Use Topics    Alcohol use: Never    Drug use: Never       Allergies: Allergies   Allergen Reactions    Amoxicillin Hives    Grass Pollen Hives and Rash    Kiwi Hives    Penicillins Hives       PHYSICAL EXAM      ED Triage Vitals   ED Encounter Vitals Group      BP 04/26/23 1212 128/89      Pulse (Heart Rate) 04/26/23 1212 92      Resp Rate 04/26/23 1212 16      Temp 04/26/23 1212 97.9 °F (36.6 °C)      Temp src --       O2 Sat (%) 04/26/23 1212 100 %      Weight 04/26/23 1211 214 lb      Height 04/26/23 1211 5' 5\"        Physical Exam  Vitals reviewed. Constitutional:       General: She is not in acute distress. Appearance: Normal appearance. She is not ill-appearing, toxic-appearing or diaphoretic. HENT:      Head: Normocephalic. Mouth/Throat:      Mouth: Mucous membranes are moist.   Eyes:      Conjunctiva/sclera: Conjunctivae normal.   Cardiovascular:      Rate and Rhythm: Normal rate. Pulmonary:      Effort: Pulmonary effort is normal. No respiratory distress. Abdominal:      General: Abdomen is flat. Tenderness: There is abdominal tenderness (mild) in the right upper quadrant and epigastric area. There is no guarding or rebound. Negative signs include Adame's sign. Musculoskeletal:         General: No deformity. Cervical back: Neck supple. Skin:     General: Skin is warm and dry. Neurological:      General: No focal deficit present. Mental Status: She is alert. Psychiatric:         Mood and Affect: Mood normal.        EMERGENCY DEPARTMENT COURSE and DIFFERENTIAL DIAGNOSIS/MDM   CC/HPI Summary, DDx, ED Course, and Reassessment:     MDM  Number of Diagnoses or Management Options  Cholecystitis  Diagnosis management comments: Patient is a well-appearing 25-year-old female presenting with concern of epigastric and right upper quadrant abdominal pain, states that she was seen evaluated here recently, history of gallstones, concerned that the pain is returned and is very severe, did not take anything prior to arrival, also endorses nausea, well-appearing on arrival with normal vital signs, afebrile, mild tenderness in the epigastric region, will plan on repeat ultrasound at this time to evaluate for cholecystitis, basic lab including CBC, CMP to evaluate for obstruction, will plan on pain and nausea control and given the patient has follow-up with urgent surgical appointment in 2 days if lab work unremarkable as well as no signs of infection would consider discharge with pain control, nausea control and close follow-up, patient is amendable to plan.        Amount and/or Complexity of Data Reviewed  Decide to obtain previous medical records or to obtain history from someone other than the patient: yes        ED Course as of 04/26/23 1625   Wed Apr 26, 2023   1545 Patient with ultrasound findings of acute cholecystitis, will discuss with Dr. Lucio Lizama for admission [RN]      ED Course User Index  [RN] Sara South MD       Vitals:    04/26/23 1211 04/26/23 1212   BP:  128/89   Pulse:  92   Resp:  16   Temp:  97.9 °F (36.6 °C)   SpO2:  100%   Weight: 97.1 kg (214 lb)    Height: 5' 5\" (1.651 m)         Patient was given the following medications:  Medications   ondansetron (ZOFRAN) injection 4 mg (has no administration in time range)   morphine injection 4 mg (has no administration in time range)   morphine injection 4 mg (4 mg IntraVENous Given 4/26/23 1428)   ondansetron (ZOFRAN) injection 4 mg (4 mg IntraVENous Given 4/26/23 1347)       CONSULTS:  IP CONSULT TO HOSPITALIST    Social Determinants affecting Dx or Tx: Patient lacks transportation. Records Reviewed (source and summary of external notes): Prior medical records  DIAGNOSTIC RESULTS   LABS:     Recent Results (from the past 12 hour(s))   CBC WITH AUTOMATED DIFF    Collection Time: 04/26/23 12:29 PM   Result Value Ref Range    WBC 8.2 3.6 - 11.0 K/uL    RBC 4.87 3.80 - 5.20 M/uL    HGB 14.1 11.5 - 16.0 g/dL    HCT 41.6 35.0 - 47.0 %    MCV 85.4 80.0 - 99.0 FL    MCH 29.0 26.0 - 34.0 PG    MCHC 33.9 30.0 - 36.5 g/dL    RDW 13.4 11.5 - 14.5 %    PLATELET 213 482 - 190 K/uL    MPV 10.3 8.9 - 12.9 FL    NRBC 0.0 0  WBC    ABSOLUTE NRBC 0.00 0.00 - 0.01 K/uL    NEUTROPHILS 70 32 - 75 %    LYMPHOCYTES 26 12 - 49 %    MONOCYTES 4 (L) 5 - 13 %    EOSINOPHILS 0 0 - 7 %    BASOPHILS 0 0 - 1 %    IMMATURE GRANULOCYTES 0 0.0 - 0.5 %    ABS. NEUTROPHILS 5.6 1.8 - 8.0 K/UL    ABS. LYMPHOCYTES 2.2 0.8 - 3.5 K/UL    ABS. MONOCYTES 0.4 0.0 - 1.0 K/UL    ABS. EOSINOPHILS 0.0 0.0 - 0.4 K/UL    ABS. BASOPHILS 0.0 0.0 - 0.1 K/UL    ABS. IMM. GRANS. 0.0 0.00 - 0.04 K/UL    DF AUTOMATED     METABOLIC PANEL, COMPREHENSIVE    Collection Time: 04/26/23 12:29 PM   Result Value Ref Range    Sodium 138 136 - 145 mmol/L    Potassium 4.1 3.5 - 5.1 mmol/L    Chloride 108 97 - 108 mmol/L    CO2 22 21 - 32 mmol/L    Anion gap 8 5 - 15 mmol/L    Glucose 108 (H) 65 - 100 mg/dL    BUN 8 6 - 20 MG/DL    Creatinine 0.83 0.55 - 1.02 MG/DL    BUN/Creatinine ratio 10 (L) 12 - 20      eGFR >60 >60 ml/min/1.73m2    Calcium 9.1 8.5 - 10.1 MG/DL    Bilirubin, total 2.1 (H) 0.2 - 1.0 MG/DL    ALT (SGPT) 153 (H) 12 - 78 U/L    AST (SGOT) 140 (H) 15 - 37 U/L    Alk.  phosphatase 192 (H) 45 - 117 U/L    Protein, total 7.8 6.4 - 8.2 g/dL    Albumin 3.6 3.5 - 5.0 g/dL    Globulin 4.2 (H) 2.0 - 4.0 g/dL    A-G Ratio 0.9 (L) 1.1 - 2.2     LIPASE Collection Time: 04/26/23 12:29 PM   Result Value Ref Range    Lipase 173 73 - 393 U/L   URINALYSIS W/ RFLX MICROSCOPIC    Collection Time: 04/26/23  1:52 PM   Result Value Ref Range    Color YELLOW/STRAW      Appearance CLEAR CLEAR      Specific gravity 1.009      pH (UA) 8.0 5.0 - 8.0      Protein Negative NEG mg/dL    Glucose Negative NEG mg/dL    Ketone 15 (A) NEG mg/dL    Bilirubin Negative NEG      Blood Negative NEG      Urobilinogen 1.0 0.2 - 1.0 EU/dL    Nitrites Negative NEG      Leukocyte Esterase TRACE (A) NEG      WBC 5-10 0 - 4 /hpf    RBC 0-5 0 - 5 /hpf    Epithelial cells MANY (A) FEW /lpf    Bacteria 1+ (A) NEG /hpf    Hyaline cast 0-2 0 - 2 /lpf   URINE CULTURE HOLD SAMPLE    Collection Time: 04/26/23  1:52 PM    Specimen: Urine   Result Value Ref Range    Urine culture hold        Urine on hold in Microbiology dept for 2 days. If unpreserved urine is submitted, it cannot be used for addtional testing after 24 hours, recollection will be required. HCG URINE, QL. - POC    Collection Time: 04/26/23  1:54 PM   Result Value Ref Range    Pregnancy test,urine (POC) Negative NEG          EKG interpreted by me:      RADIOLOGY:  Non-plain film images such as CT, Ultrasound and MRI are read by the radiologist.   Plain radiographic images are often visualized and preliminarily interpreted by the ED, if an interpretation was completed the findings will be listed below:        Interpretation per the Radiologist below, if available at the time of this note:     US ABD LTD    Result Date: 4/26/2023  INDICATION: Eval for cholecystitis COMPARISON: Abdominal ultrasound March 10, 2023 TECHNIQUE: Routine ultrasound images of the RIGHT upper quadrant of the abdomen were obtained. FINDINGS: LIVER: No significant enlargement or evidence of parenchymal lesion. MAIN PORTAL VEIN: Patent with appropriate hepatopetal flow direction. The main portal vein measures 0.9 cm diameter.  GALLBLADDER: The gallbladder is distended. Small mobile stones are seen within the gallbladder. There is questionable mild wall thickening of the gallbladder. No pericholecystic fluid collection is demonstrated. COMMON BILE DUCT: 4 mm in diameter. No significant dilatation. PANCREAS: Not well visualized due to bowel gas though visualized portions are unremarkable. RIGHT KIDNEY: 10.9 cm. No hydronephrosis or nephrolithiasis. OTHER: N/A.     1. The gallbladder is distended and there are small, mobile stones. There is questionable wall thickening of the gallbladder raising suspicion for acute cholecystitis. 2. Normal sonographic appearance of the liver and RIGHT kidney. PROCEDURES   Unless otherwise noted below, none  Procedures     CRITICAL CARE TIME       FINAL IMPRESSION     1. Cholecystitis          DISPOSITION/PLAN   Admitted    Admit Note: Pt is being admitted by [facility] [place] [place]. The results of their tests and reason(s) for their admission have been discussed with pt and/or available family. They convey agreement and understanding for the need to be admitted and for the admission diagnosis. PATIENT REFERRED TO:  Follow-up Information       Follow up With Specialties Details Why Contact Info    Ashish Grace MD Family Medicine   [address] [place] [address]  [phone]                DISCHARGE MEDICATIONS:  Current Discharge Medication List            DISCONTINUED MEDICATIONS:  Current Discharge Medication List          I am the Primary Clinician of Record. Signed By: Christopher Manuel MD     April 26, 2023      (Please note that parts of this dictation were completed with voice recognition software. Quite often unanticipated grammatical, syntax, homophones, and other interpretive errors are inadvertently transcribed by the computer software. Please disregards these errors.  Please excuse any errors that have escaped final proofreading.)

## 2023-04-26 NOTE — PROGRESS NOTES
End of Shift Note    Bedside shift change report given to William Esquivel RN (oncoming nurse) by Louise Burt RN (offgoing nurse).   Report included the following information SBAR and Kardex    Shift worked:  [id number]     Shift summary and any significant changes:    -new admit  -plan for lap mehran     Concerns for physician to address: none   Zone phone for oncoming shift:

## 2023-04-26 NOTE — ED NOTES
PT. States 7/10 abdominal pain. States she was here 3/10 and found gallstones. States she was instructed to eat different foods. States yesterdayy she ate pizza from [employer] and afterwards her stomach began to hurt. Complains of nausea as well.

## 2023-04-26 NOTE — H&P
Subjective:      Levi Yao is an 32 y.o.  female who present s to the ER with RUQ  pain. Pain stated yesterday. Pain I sharp and stabling. Nothing makes it better. Contact makes it worse. Pain is associated with nausea and vomiting. Patient has a know history of gallstone and was in the ER one month ago for the same. Past Medical History:   Diagnosis Date    Depression     Anxiety    Encounter for Papanicolaou smear for cervical cancer screening 12/13/2021    neg/hpv not tested    LANCE (generalized anxiety disorder)     GERD (gastroesophageal reflux disease)     Hearing loss, left     Learning disability     reading    Obesity (BMI 35.0-39.9 without comorbidity)      Family History   Problem Relation Age of Onset    No Known Problems Mother     No Known Problems Father     Diabetes Paternal Grandmother     No Known Problems Half-sister     No Known Problems Half-brother     No Known Problems Half-brother      Prior to Admission Medications   Prescriptions Last Dose Informant Patient Reported? Taking? FLUoxetine (PROzac) 40 mg capsule   No No   Sig: Take 1 Capsule by mouth daily for 30 days. norethindrone-ethinyl estradiol (JUNEL FE 1/20) 1 mg-20 mcg (21)/75 mg (7) tab   No No   Sig: Take 1 Tablet by mouth daily. pantoprazole (PROTONIX) 40 mg tablet   No No   Sig: Take 1 Tablet by mouth daily.       Facility-Administered Medications: None     Allergies   Allergen Reactions    Amoxicillin Hives    Grass Pollen Hives and Rash    Kiwi Hives    Penicillins Hives     Social History     Socioeconomic History    Marital status:      Spouse name: Not on file    Number of children: Not on file    Years of education: Not on file    Highest education level: Not on file   Occupational History    Not on file   Tobacco Use    Smoking status: Never    Smokeless tobacco: Never   Vaping Use    Vaping Use: Never used   Substance and Sexual Activity    Alcohol use: Never    Drug use: Never    Sexual activity: Yes     Birth control/protection: None   Other Topics Concern     Service Not Asked    Blood Transfusions Not Asked    Caffeine Concern Not Asked    Occupational Exposure Not Asked    Hobby Hazards Not Asked    Sleep Concern Not Asked    Stress Concern Not Asked    Weight Concern Not Asked    Special Diet Not Asked    Back Care Not Asked    Exercise Not Asked    Bike Helmet Not Asked    Seat Belt Not Asked    Self-Exams Not Asked   Social History Narrative    Not on file     Social Determinants of Health     Financial Resource Strain: Not on file   Food Insecurity: Not on file   Transportation Needs: Not on file   Physical Activity: Not on file   Stress: Not on file   Social Connections: Not on file   Intimate Partner Violence: Not on file   Housing Stability: Not on file       Review of Systems: A comprehensive review of systems was negative except for that written in the HPI. Objective:      Visit Vitals  /89   Pulse 92   Temp 97.9 °F (36.6 °C)   Resp 16   Ht 5' 5\" (1.651 m)   Wt 97.1 kg (214 lb)   LMP 03/22/2023 (Approximate)   SpO2 100%   BMI 35.61 kg/m²        General appearance: alert, cooperative, no distress, appears stated age  Lungs: clear to auscultation bilaterally  Heart: regular rate and rhythm, S1, S2 normal, no murmur, click, rub or gallop  Abdomen: soft, tender in RUQ .  Bowel sounds normal. No masses,  no organomegaly    Imaging:  images and reports reviewed    Lab Review:      Recent Results (from the past 24 hour(s))   CBC WITH AUTOMATED DIFF    Collection Time: 04/26/23 12:29 PM   Result Value Ref Range    WBC 8.2 3.6 - 11.0 K/uL    RBC 4.87 3.80 - 5.20 M/uL    HGB 14.1 11.5 - 16.0 g/dL    HCT 41.6 35.0 - 47.0 %    MCV 85.4 80.0 - 99.0 FL    MCH 29.0 26.0 - 34.0 PG    MCHC 33.9 30.0 - 36.5 g/dL    RDW 13.4 11.5 - 14.5 %    PLATELET 266 958 - 071 K/uL    MPV 10.3 8.9 - 12.9 FL    NRBC 0.0 0  WBC    ABSOLUTE NRBC 0.00 0.00 - 0.01 K/uL NEUTROPHILS 70 32 - 75 %    LYMPHOCYTES 26 12 - 49 %    MONOCYTES 4 (L) 5 - 13 %    EOSINOPHILS 0 0 - 7 %    BASOPHILS 0 0 - 1 %    IMMATURE GRANULOCYTES 0 0.0 - 0.5 %    ABS. NEUTROPHILS 5.6 1.8 - 8.0 K/UL    ABS. LYMPHOCYTES 2.2 0.8 - 3.5 K/UL    ABS. MONOCYTES 0.4 0.0 - 1.0 K/UL    ABS. EOSINOPHILS 0.0 0.0 - 0.4 K/UL    ABS. BASOPHILS 0.0 0.0 - 0.1 K/UL    ABS. IMM. GRANS. 0.0 0.00 - 0.04 K/UL    DF AUTOMATED     METABOLIC PANEL, COMPREHENSIVE    Collection Time: 04/26/23 12:29 PM   Result Value Ref Range    Sodium 138 136 - 145 mmol/L    Potassium 4.1 3.5 - 5.1 mmol/L    Chloride 108 97 - 108 mmol/L    CO2 22 21 - 32 mmol/L    Anion gap 8 5 - 15 mmol/L    Glucose 108 (H) 65 - 100 mg/dL    BUN 8 6 - 20 MG/DL    Creatinine 0.83 0.55 - 1.02 MG/DL    BUN/Creatinine ratio 10 (L) 12 - 20      eGFR >60 >60 ml/min/1.73m2    Calcium 9.1 8.5 - 10.1 MG/DL    Bilirubin, total 2.1 (H) 0.2 - 1.0 MG/DL    ALT (SGPT) 153 (H) 12 - 78 U/L    AST (SGOT) 140 (H) 15 - 37 U/L    Alk.  phosphatase 192 (H) 45 - 117 U/L    Protein, total 7.8 6.4 - 8.2 g/dL    Albumin 3.6 3.5 - 5.0 g/dL    Globulin 4.2 (H) 2.0 - 4.0 g/dL    A-G Ratio 0.9 (L) 1.1 - 2.2     LIPASE    Collection Time: 04/26/23 12:29 PM   Result Value Ref Range    Lipase 173 73 - 393 U/L   URINALYSIS W/ RFLX MICROSCOPIC    Collection Time: 04/26/23  1:52 PM   Result Value Ref Range    Color YELLOW/STRAW      Appearance CLEAR CLEAR      Specific gravity 1.009      pH (UA) 8.0 5.0 - 8.0      Protein Negative NEG mg/dL    Glucose Negative NEG mg/dL    Ketone 15 (A) NEG mg/dL    Bilirubin Negative NEG      Blood Negative NEG      Urobilinogen 1.0 0.2 - 1.0 EU/dL    Nitrites Negative NEG      Leukocyte Esterase TRACE (A) NEG      WBC 5-10 0 - 4 /hpf    RBC 0-5 0 - 5 /hpf    Epithelial cells MANY (A) FEW /lpf    Bacteria 1+ (A) NEG /hpf    Hyaline cast 0-2 0 - 2 /lpf   URINE CULTURE HOLD SAMPLE    Collection Time: 04/26/23  1:52 PM    Specimen: Urine   Result Value Ref Range Urine culture hold        Urine on hold in Microbiology dept for 2 days. If unpreserved urine is submitted, it cannot be used for addtional testing after 24 hours, recollection will be required. HCG URINE, QL. - POC    Collection Time: 23  1:54 PM   Result Value Ref Range    Pregnancy test,urine (POC) Negative NEG         Labs and radiology images and reports reviewed      Assessment:     Acute cholecystitis     Plan/Recommendations/Medical Decision Makin. I recommend laparoscopic cholecystectomy with possible cholangiogram  Treatment alternatives were discussed. 2. Discussed aspects of surgical intervention, methods, risks (including by not limited to infection, bleeding, retained gallstones in the abdominal cavity and/or the main bile ducts, and injury to adjacent structures including the biliary system, common bile duct, and intestines.)  The patient understands the risks, any and all questions were answered to the patient's satisfaction. 3. Patient does wish to proceed with surgery.

## 2023-04-26 NOTE — ED TRIAGE NOTES
Pt arrives to ed via ems w reports of substernal abd pain onset yesterday. Denies radiating pain. HX gallstones. C/o nausea.

## 2023-04-26 NOTE — ED NOTES
eTRANSFER SBAR NOTE    IP UNIT CALLED NOTE IS READY: Yes Spoke to (receiving unit sec, tech or Nurse) Marcio Beach    IF there are questions Call transferring nurse (your name) Danisha Hoyos at phone # [phone]    SITUATION/BACKGROUND:    Patient is being transferred to [address] [place] [address] , Room# 81 [phone]    Patient's Chief Complaint on arrival to ED was abdominal pain and is admitted for acute cholecystitis. CODE STATUS: Full Code    VITAL SIGNS (MUST BE WITHIN 1 HOUR OF TRANSFER TO IP UNIT:    Patient Vitals for the past 4 hrs:   Temp Pulse Resp BP SpO2   04/26/23 1741 97.8 °F (36.6 °C) 81 14 129/85 100 %           ISOLATION/PRECAUTIONS: No   ISOLATION TYPE: none    Called outstanding consults: Yes     Are there sign and held orders that need to be released? No   Are all STAT orders completed: Yes    STAT labs collected: Yes  REPEAT LACTIC ACID DUE? No    Critical Labs Results? No  What? PHARMACY NEEDS:  Are there any titrating drips? No   If so what? no    Are there STAT meds  that need to be given? Yes    The following personal items will be sent with the patient during transfer to the floor: All valuables:   ITEM:    ITEM: Visual Aid: None  ITEM:           ASSESSMENT:    CIWA Assessment: No    NEURO:   NIH SCORE:        KENDRA SCREENING:          NEURO ASSESSMENT:        If a Stroke Case . .. When are the next V/S, nuero, NIH due? no    Is patient impulsive? No   Is patient oriented? Yes   Do they follow commands? Yes  Is the patient ambulatory? Yes Device need none    FALL RISK? No    Is the [place] [facility] Assessment completed? Yes  INTERVENTIONS: none    INTEGUMENTARY:   IS THE PATIENT UNDRESSED? Yes  WOUNDS PRESENT? No  On admit to ED No   ARE THE WOUNDS DOCUMENTED? No     RESPIRATORY:   Is patient on Oxygen? No    OXYGEN: Oxygen Therapy  O2 Device: None (04/26/23 1741)  IS patient on VENT? No      CARDIAC:   Is cardiac monitoring ordered? No    Patient to transfer with tele box on? No   Is patient using a LIFE VEST?  No     LINE ACCESS:   Peripheral IV 04/26/23 Right Antecubital (Active)        /GI:   CONTINENT BOWEL/BLADDER? Yes  URINARY OUTPUT: voiding   Written Order for C[place]allos Cath? No   WAS UA WITH REFLEX SENT TO LAB?  Yes IF NO,  COLLECT AND SEND PRIOR TO TRANSPORT TO INPATIENT AREA    RESTRAINTS IN USE: No      Additional details as Needed:

## 2023-04-27 ENCOUNTER — APPOINTMENT (OUTPATIENT)
Dept: GENERAL RADIOLOGY | Age: 28
End: 2023-04-27
Attending: INTERNAL MEDICINE
Payer: MEDICAID

## 2023-04-27 ENCOUNTER — ANESTHESIA EVENT (OUTPATIENT)
Dept: ENDOSCOPY | Age: 28
End: 2023-04-27
Payer: MEDICAID

## 2023-04-27 ENCOUNTER — ANESTHESIA (OUTPATIENT)
Dept: ENDOSCOPY | Age: 28
End: 2023-04-27
Payer: MEDICAID

## 2023-04-27 LAB
ALBUMIN SERPL-MCNC: 3.2 G/DL (ref 3.5–5)
ALBUMIN/GLOB SERPL: 1 (ref 1.1–2.2)
ALP SERPL-CCNC: 189 U/L (ref 45–117)
ALT SERPL-CCNC: 234 U/L (ref 12–78)
ANION GAP SERPL CALC-SCNC: 8 MMOL/L (ref 5–15)
AST SERPL-CCNC: 176 U/L (ref 15–37)
BILIRUB SERPL-MCNC: 2.8 MG/DL (ref 0.2–1)
BUN SERPL-MCNC: 8 MG/DL (ref 6–20)
BUN/CREAT SERPL: 9 (ref 12–20)
CALCIUM SERPL-MCNC: 7.9 MG/DL (ref 8.5–10.1)
CHLORIDE SERPL-SCNC: 111 MMOL/L (ref 97–108)
CO2 SERPL-SCNC: 21 MMOL/L (ref 21–32)
CREAT SERPL-MCNC: 0.85 MG/DL (ref 0.55–1.02)
ERYTHROCYTE [DISTWIDTH] IN BLOOD BY AUTOMATED COUNT: 13.6 % (ref 11.5–14.5)
GLOBULIN SER CALC-MCNC: 3.3 G/DL (ref 2–4)
GLUCOSE SERPL-MCNC: 161 MG/DL (ref 65–100)
HCT VFR BLD AUTO: 37.7 % (ref 35–47)
HGB BLD-MCNC: 12.8 G/DL (ref 11.5–16)
MCH RBC QN AUTO: 29.5 PG (ref 26–34)
MCHC RBC AUTO-ENTMCNC: 34 G/DL (ref 30–36.5)
MCV RBC AUTO: 86.9 FL (ref 80–99)
NRBC # BLD: 0 K/UL (ref 0–0.01)
NRBC BLD-RTO: 0 PER 100 WBC
PLATELET # BLD AUTO: 211 K/UL (ref 150–400)
PMV BLD AUTO: 10.5 FL (ref 8.9–12.9)
POTASSIUM SERPL-SCNC: 3.8 MMOL/L (ref 3.5–5.1)
PROT SERPL-MCNC: 6.5 G/DL (ref 6.4–8.2)
RBC # BLD AUTO: 4.34 M/UL (ref 3.8–5.2)
SODIUM SERPL-SCNC: 140 MMOL/L (ref 136–145)
WBC # BLD AUTO: 15 K/UL (ref 3.6–11)

## 2023-04-27 PROCEDURE — 2709999900 HC NON-CHARGEABLE SUPPLY: Performed by: INTERNAL MEDICINE

## 2023-04-27 PROCEDURE — 74011250637 HC RX REV CODE- 250/637: Performed by: SURGERY

## 2023-04-27 PROCEDURE — 74330 X-RAY BILE/PANC ENDOSCOPY: CPT

## 2023-04-27 PROCEDURE — 85027 COMPLETE CBC AUTOMATED: CPT

## 2023-04-27 PROCEDURE — 74011250636 HC RX REV CODE- 250/636: Performed by: NURSE ANESTHETIST, CERTIFIED REGISTERED

## 2023-04-27 PROCEDURE — 74011250636 HC RX REV CODE- 250/636: Performed by: INTERNAL MEDICINE

## 2023-04-27 PROCEDURE — 77030041276 HC SPHNTOM BILI BSC -F: Performed by: INTERNAL MEDICINE

## 2023-04-27 PROCEDURE — 74011000250 HC RX REV CODE- 250: Performed by: INTERNAL MEDICINE

## 2023-04-27 PROCEDURE — 74011000636 HC RX REV CODE- 636: Performed by: INTERNAL MEDICINE

## 2023-04-27 PROCEDURE — 74011250636 HC RX REV CODE- 250/636: Performed by: SURGERY

## 2023-04-27 PROCEDURE — 74011250636 HC RX REV CODE- 250/636: Performed by: ANESTHESIOLOGY

## 2023-04-27 PROCEDURE — 74011000250 HC RX REV CODE- 250: Performed by: NURSE ANESTHETIST, CERTIFIED REGISTERED

## 2023-04-27 PROCEDURE — 80053 COMPREHEN METABOLIC PANEL: CPT

## 2023-04-27 PROCEDURE — 76210000063 HC OR PH I REC FIRST 0.5 HR

## 2023-04-27 PROCEDURE — C1769 GUIDE WIRE: HCPCS | Performed by: INTERNAL MEDICINE

## 2023-04-27 PROCEDURE — G0378 HOSPITAL OBSERVATION PER HR: HCPCS

## 2023-04-27 PROCEDURE — 77030008684 HC TU ET CUF COVD -B: Performed by: ANESTHESIOLOGY

## 2023-04-27 PROCEDURE — 77030010803: Performed by: INTERNAL MEDICINE

## 2023-04-27 PROCEDURE — 74011250637 HC RX REV CODE- 250/637: Performed by: INTERNAL MEDICINE

## 2023-04-27 PROCEDURE — 77030021593 HC FCPS BIOP ENDOSC BSC -A: Performed by: INTERNAL MEDICINE

## 2023-04-27 PROCEDURE — 76060000032 HC ANESTHESIA 0.5 TO 1 HR: Performed by: INTERNAL MEDICINE

## 2023-04-27 PROCEDURE — 77030009038 HC CATH BILI STN RTVR BSC -C: Performed by: INTERNAL MEDICINE

## 2023-04-27 PROCEDURE — 76040000007: Performed by: INTERNAL MEDICINE

## 2023-04-27 PROCEDURE — 36415 COLL VENOUS BLD VENIPUNCTURE: CPT

## 2023-04-27 PROCEDURE — 74011000250 HC RX REV CODE- 250: Performed by: SURGERY

## 2023-04-27 PROCEDURE — 77030026438 HC STYL ET INTUB CARD -A: Performed by: ANESTHESIOLOGY

## 2023-04-27 RX ORDER — SODIUM CHLORIDE, SODIUM LACTATE, POTASSIUM CHLORIDE, CALCIUM CHLORIDE 600; 310; 30; 20 MG/100ML; MG/100ML; MG/100ML; MG/100ML
75 INJECTION, SOLUTION INTRAVENOUS CONTINUOUS
Status: DISCONTINUED | OUTPATIENT
Start: 2023-04-27 | End: 2023-04-27

## 2023-04-27 RX ORDER — LIDOCAINE HYDROCHLORIDE 20 MG/ML
INJECTION, SOLUTION EPIDURAL; INFILTRATION; INTRACAUDAL; PERINEURAL AS NEEDED
Status: DISCONTINUED | OUTPATIENT
Start: 2023-04-27 | End: 2023-04-27 | Stop reason: HOSPADM

## 2023-04-27 RX ORDER — SODIUM CHLORIDE 0.9 % (FLUSH) 0.9 %
5-40 SYRINGE (ML) INJECTION EVERY 8 HOURS
Status: DISCONTINUED | OUTPATIENT
Start: 2023-04-27 | End: 2023-04-27 | Stop reason: HOSPADM

## 2023-04-27 RX ORDER — NALOXONE HYDROCHLORIDE 0.4 MG/ML
0.4 INJECTION, SOLUTION INTRAMUSCULAR; INTRAVENOUS; SUBCUTANEOUS
Status: DISCONTINUED | OUTPATIENT
Start: 2023-04-27 | End: 2023-04-27 | Stop reason: HOSPADM

## 2023-04-27 RX ORDER — SODIUM CHLORIDE, SODIUM LACTATE, POTASSIUM CHLORIDE, CALCIUM CHLORIDE 600; 310; 30; 20 MG/100ML; MG/100ML; MG/100ML; MG/100ML
25 INJECTION, SOLUTION INTRAVENOUS CONTINUOUS
Status: DISCONTINUED | OUTPATIENT
Start: 2023-04-27 | End: 2023-04-27

## 2023-04-27 RX ORDER — DIPHENHYDRAMINE HYDROCHLORIDE 50 MG/ML
12.5 INJECTION, SOLUTION INTRAMUSCULAR; INTRAVENOUS AS NEEDED
Status: DISCONTINUED | OUTPATIENT
Start: 2023-04-27 | End: 2023-04-27 | Stop reason: HOSPADM

## 2023-04-27 RX ORDER — FENTANYL CITRATE 50 UG/ML
INJECTION, SOLUTION INTRAMUSCULAR; INTRAVENOUS AS NEEDED
Status: DISCONTINUED | OUTPATIENT
Start: 2023-04-27 | End: 2023-04-27 | Stop reason: HOSPADM

## 2023-04-27 RX ORDER — MIDAZOLAM HYDROCHLORIDE 1 MG/ML
INJECTION, SOLUTION INTRAMUSCULAR; INTRAVENOUS AS NEEDED
Status: DISCONTINUED | OUTPATIENT
Start: 2023-04-27 | End: 2023-04-27 | Stop reason: HOSPADM

## 2023-04-27 RX ORDER — FLUMAZENIL 0.1 MG/ML
0.2 INJECTION INTRAVENOUS
Status: DISCONTINUED | OUTPATIENT
Start: 2023-04-27 | End: 2023-04-27 | Stop reason: HOSPADM

## 2023-04-27 RX ORDER — PROPOFOL 10 MG/ML
INJECTION, EMULSION INTRAVENOUS AS NEEDED
Status: DISCONTINUED | OUTPATIENT
Start: 2023-04-27 | End: 2023-04-27 | Stop reason: HOSPADM

## 2023-04-27 RX ORDER — SODIUM CHLORIDE 0.9 % (FLUSH) 0.9 %
5-40 SYRINGE (ML) INJECTION EVERY 8 HOURS
Status: DISCONTINUED | OUTPATIENT
Start: 2023-04-27 | End: 2023-04-28 | Stop reason: HOSPADM

## 2023-04-27 RX ORDER — ATROPINE SULFATE 0.1 MG/ML
0.5 INJECTION INTRAVENOUS
Status: DISCONTINUED | OUTPATIENT
Start: 2023-04-27 | End: 2023-04-27 | Stop reason: HOSPADM

## 2023-04-27 RX ORDER — HYDROMORPHONE HYDROCHLORIDE 1 MG/ML
.2-.5 INJECTION, SOLUTION INTRAMUSCULAR; INTRAVENOUS; SUBCUTANEOUS
Status: DISCONTINUED | OUTPATIENT
Start: 2023-04-27 | End: 2023-04-27 | Stop reason: HOSPADM

## 2023-04-27 RX ORDER — MIDAZOLAM HYDROCHLORIDE 1 MG/ML
INJECTION, SOLUTION INTRAMUSCULAR; INTRAVENOUS
Status: COMPLETED
Start: 2023-04-27 | End: 2023-04-27

## 2023-04-27 RX ORDER — LIDOCAINE HYDROCHLORIDE 10 MG/ML
0.1 INJECTION, SOLUTION EPIDURAL; INFILTRATION; INTRACAUDAL; PERINEURAL AS NEEDED
Status: DISCONTINUED | OUTPATIENT
Start: 2023-04-27 | End: 2023-04-27 | Stop reason: HOSPADM

## 2023-04-27 RX ORDER — SUCCINYLCHOLINE CHLORIDE 20 MG/ML
INJECTION INTRAMUSCULAR; INTRAVENOUS AS NEEDED
Status: DISCONTINUED | OUTPATIENT
Start: 2023-04-27 | End: 2023-04-27 | Stop reason: HOSPADM

## 2023-04-27 RX ORDER — ONDANSETRON 2 MG/ML
INJECTION INTRAMUSCULAR; INTRAVENOUS AS NEEDED
Status: DISCONTINUED | OUTPATIENT
Start: 2023-04-27 | End: 2023-04-27 | Stop reason: HOSPADM

## 2023-04-27 RX ORDER — LEVOFLOXACIN 5 MG/ML
500 INJECTION, SOLUTION INTRAVENOUS ONCE
Status: COMPLETED | OUTPATIENT
Start: 2023-04-27 | End: 2023-04-27

## 2023-04-27 RX ORDER — SODIUM CHLORIDE 0.9 % (FLUSH) 0.9 %
5-40 SYRINGE (ML) INJECTION AS NEEDED
Status: DISCONTINUED | OUTPATIENT
Start: 2023-04-27 | End: 2023-04-27 | Stop reason: HOSPADM

## 2023-04-27 RX ORDER — SODIUM CHLORIDE 0.9 % (FLUSH) 0.9 %
5-40 SYRINGE (ML) INJECTION AS NEEDED
Status: DISCONTINUED | OUTPATIENT
Start: 2023-04-27 | End: 2023-04-28 | Stop reason: HOSPADM

## 2023-04-27 RX ORDER — FENTANYL CITRATE 50 UG/ML
25 INJECTION, SOLUTION INTRAMUSCULAR; INTRAVENOUS
Status: DISCONTINUED | OUTPATIENT
Start: 2023-04-27 | End: 2023-04-27 | Stop reason: HOSPADM

## 2023-04-27 RX ORDER — ONDANSETRON 2 MG/ML
4 INJECTION INTRAMUSCULAR; INTRAVENOUS AS NEEDED
Status: DISCONTINUED | OUTPATIENT
Start: 2023-04-27 | End: 2023-04-27 | Stop reason: HOSPADM

## 2023-04-27 RX ORDER — EPINEPHRINE 0.1 MG/ML
1 INJECTION INTRACARDIAC; INTRAVENOUS
Status: DISCONTINUED | OUTPATIENT
Start: 2023-04-27 | End: 2023-04-27 | Stop reason: HOSPADM

## 2023-04-27 RX ORDER — DEXAMETHASONE SODIUM PHOSPHATE 4 MG/ML
INJECTION, SOLUTION INTRA-ARTICULAR; INTRALESIONAL; INTRAMUSCULAR; INTRAVENOUS; SOFT TISSUE AS NEEDED
Status: DISCONTINUED | OUTPATIENT
Start: 2023-04-27 | End: 2023-04-27 | Stop reason: HOSPADM

## 2023-04-27 RX ORDER — MORPHINE SULFATE 2 MG/ML
2 INJECTION, SOLUTION INTRAMUSCULAR; INTRAVENOUS
Status: DISCONTINUED | OUTPATIENT
Start: 2023-04-27 | End: 2023-04-27 | Stop reason: HOSPADM

## 2023-04-27 RX ORDER — FENTANYL CITRATE 50 UG/ML
INJECTION, SOLUTION INTRAMUSCULAR; INTRAVENOUS
Status: COMPLETED
Start: 2023-04-27 | End: 2023-04-27

## 2023-04-27 RX ORDER — DEXTROMETHORPHAN/PSEUDOEPHED 2.5-7.5/.8
1.2 DROPS ORAL
Status: DISCONTINUED | OUTPATIENT
Start: 2023-04-27 | End: 2023-04-27 | Stop reason: HOSPADM

## 2023-04-27 RX ADMIN — SODIUM CHLORIDE, PRESERVATIVE FREE 10 ML: 5 INJECTION INTRAVENOUS at 14:00

## 2023-04-27 RX ADMIN — INDOMETHACIN 100 MG: 50 SUPPOSITORY RECTAL at 13:07

## 2023-04-27 RX ADMIN — IOTHALAMATE MEGLUMINE 10 ML: 600 INJECTION INTRAVASCULAR at 13:00

## 2023-04-27 RX ADMIN — PROPOFOL 300 MG: 10 INJECTION, EMULSION INTRAVENOUS at 12:43

## 2023-04-27 RX ADMIN — MIDAZOLAM HYDROCHLORIDE 2 MG: 1 INJECTION, SOLUTION INTRAMUSCULAR; INTRAVENOUS at 12:35

## 2023-04-27 RX ADMIN — ONDANSETRON HYDROCHLORIDE 4 MG: 2 INJECTION, SOLUTION INTRAMUSCULAR; INTRAVENOUS at 13:01

## 2023-04-27 RX ADMIN — FENTANYL CITRATE 50 MCG: 50 INJECTION, SOLUTION INTRAMUSCULAR; INTRAVENOUS at 12:40

## 2023-04-27 RX ADMIN — SODIUM CHLORIDE, PRESERVATIVE FREE 10 ML: 5 INJECTION INTRAVENOUS at 22:00

## 2023-04-27 RX ADMIN — SUCCINYLCHOLINE CHLORIDE 120 MG: 20 INJECTION, SOLUTION INTRAMUSCULAR; INTRAVENOUS at 12:43

## 2023-04-27 RX ADMIN — SODIUM CHLORIDE, PRESERVATIVE FREE 10 ML: 5 INJECTION INTRAVENOUS at 07:34

## 2023-04-27 RX ADMIN — LEVOFLOXACIN 500 MG: 5 INJECTION, SOLUTION INTRAVENOUS at 12:34

## 2023-04-27 RX ADMIN — OXYCODONE 5 MG: 5 TABLET ORAL at 01:14

## 2023-04-27 RX ADMIN — POTASSIUM CHLORIDE, DEXTROSE MONOHYDRATE AND SODIUM CHLORIDE 125 ML/HR: 150; 5; 450 INJECTION, SOLUTION INTRAVENOUS at 08:14

## 2023-04-27 RX ADMIN — SODIUM CHLORIDE, POTASSIUM CHLORIDE, SODIUM LACTATE AND CALCIUM CHLORIDE 25 ML/HR: 600; 310; 30; 20 INJECTION, SOLUTION INTRAVENOUS at 12:09

## 2023-04-27 RX ADMIN — LIDOCAINE HYDROCHLORIDE 100 MG: 20 INJECTION, SOLUTION INTRAVENOUS at 12:40

## 2023-04-27 RX ADMIN — DEXAMETHASONE SODIUM PHOSPHATE 8 MG: 4 INJECTION, SOLUTION INTRA-ARTICULAR; INTRALESIONAL; INTRAMUSCULAR; INTRAVENOUS; SOFT TISSUE at 12:47

## 2023-04-27 RX ADMIN — PANTOPRAZOLE SODIUM 40 MG: 40 TABLET, DELAYED RELEASE ORAL at 08:14

## 2023-04-27 RX ADMIN — FLUOXETINE 40 MG: 20 CAPSULE ORAL at 08:14

## 2023-04-27 NOTE — PERIOP NOTES
Handoff Report from Operating Room to PACU    Report received from [facility] and Dominik Carlisle CRNA  regarding Samira Chawla. Surgeon(s):  Charlotte Villa MD  And Procedure(s) (LRB):  CHOLECYSTECTOMY LAPAROSCOPIC WITH INTRAOPERATIVE CHOLOANGIOGRAM (N/A)  confirmed   with allergies and dressings discussed. Anesthesia type, drugs, patient history, complications, estimated blood loss, vital signs, intake and output, and last pain medication and reversal medications were reviewed.

## 2023-04-27 NOTE — OP NOTES
Laparoscopic Cholecystectomy with IOC Procedure Note    Patient: Noni Thomas  YOB: 1995  MRN: [id number]    Date of Procedure: 4/26/2023       Proceedure: laparoscopic cholecystectomy with Cholangiogram    Pre-operative Diagnosis: Acute cholecystitis    Post-operative Diagnosis:  Acute Cholecystitis and choledocholithiasis     Surgeon: Surgeon(s):  Anastacio Power MD    Assistant:  Circ-1: Kaylie Joyner  Radiology Technician: RT Denia  Scrub RN-1: [place], [address] Asst-1: Renate Spring    Anesthesia: General     Indications: This patient presents with a symptomatic gallbladder disease and will undergo laparoscopic cholecystecomy. Procedure Details   The patient was seen again in the Holding Room. The risks, benefits, complications, treatment options, and expected outcomes were discussed with the patient. The possibilities of reaction to medication, pulmonary aspiration, perforation of viscus, bleeding, recurrent infection, finding a normal gallbladder, the need for additional procedures, failure to diagnose a condition, the possible need to convert to an open procedure, and creating a complication requiring transfusion or operation were discussed with the patient. The patient and/or family concurred with the proposed plan, giving informed consent. The patient was taken to Operating Room, identified as Noni Thomas and the procedure verified as Laparoscopic Cholecystectomy with possible Intraoperative Cholangiograms. A Time Out was held and the above information confirmed. Prior to the induction of general anesthesia,  antibiotic prophylaxis was administered. General endotracheal anesthesia was then administered and tolerated well. After the induction, the abdomen was prepped in the usual sterile fashion. The abdomen was entered in the right upper quadrant in the midclavicular line just below the costal margin.  At this site, 3 mL of 0.5% Marcaine was injected in the subcutaneous space. A 5-mm incision made with an 11-blade scalpel. Then a 5-mm Xcel trocar containing 5-mm 0-degree laparoscope was used to dissect through the layers of the abdominal wall under direct laparoscopic vision. Entry into the abdomen was confirmed visually. Once within the abdomen, insufflation was provided through this trocar to a pressure of 15 mmHg. The patient tolerated insufflation well and there were no apparent complications. A 360-degree evaluation of the abdomen was then performed from this trocar  site. There were no peritoneal implants identified. There were no abnormalities on the surface of the liver. Attention was then focused at the umbilicus. Marcaine plain 0.5% 3 mL was injected in the subcutaneous space, as well as the preperitoneal space. A 12-mm curvilinear incision was made at the base of the umbilicus, and then a 86-YB Xcel trocar was inserted under direct laparoscopic vision into the abdominal cavity. The camera was then switched to this trocar position. The patient was then placed in reverse Trendelenburg with right side up. Attention was focused at the right upper quadrant, and 2 additional trocars were placed. One 5-mm trocar was placed in the epigastrium just below the xyphyoid The third 5-mm trocar was placed in the anterior axillary line just below the costal margin. At both sites, 3 mL of 0.5% Marcaine was injected into the skin and the preperitoneal space, a 5-mm incision made, and then the 5-mm trocar inserted under direct laparoscopic vision. The fundus of the gallbladder was then grasped and retracted over the dome of the liver. The infundibulum was grasped and retracted to the right and inferiorly. Blunt dissection was used to dissect out the space between the infundibulum and the gallbladder bed, and then blunt dissection was used to dissect out the cystic duct and  cystic artery.  A critical view was obtained where these were the only 2 structures entering the gallbladder. Once this critical view was obtained, a clip was placed at the base of the infundibulum. A choledochotomy was made in the very distal cystic duct. Then a 14-gauge angiocatheter was used to introduce an Arrow cholangiogram catheter into the abdominal cavity. The catheter was irrigated with saline and then inserted into the choledochotomy. Once within the cystic duct, the balloon was inflated and the cystic duct irrigated with normal saline. There was no evidence of any leakage out through the choledochotomy, indicating good seal with the balloon. Of note, the saline irrigated easily without much resistance. All instruments were removed from the patient's abdomen. She was placed in a supine position and a C-arm was brought into the field. A  film was shot to obtain proper orientation and alignment of the C-arm, and then, under real-time fluoroscopy, contrast was injected slowly by hand. Contrast was seen flowing through the cystic duct and into a common bile duct. There several small filling defects seen in the distal common bile duct. The duct was not dilated. No flow into the duodenum was visualized. Contrast easily refluxed up the common hepatic  duct and into right and left hepatic ducts and into secondary and tertiary  biliary radicles within the liver without signs of filling defects. The cholangiogram catheter was advanced into the mid-CBD. 1 mg of IV glucagon was given by anesthesia. After 2 minute circulation time, the catheter was irrigated with 20cc of saline. A cholangiogram was repeated and there was still no flow into the duodenum. I could not flush the stones into the duodenum. The C-arm was removed, the cholangiogram catheter removed. Two clips were  placed on the proximal cystic duct and then the choledochotomy  completed with laparoscopic scissors. The artery was clipped and divided in a similar fashion.       The infundibulum of the gallbladder was then retracted towards the anterior abdominal wall and the gallbladder removed from the gallbladder fossa with electrocautery. The gallbladder was then placed over the right lobe of the liver. An Endocatch bag was then inserted through the umbilical trochar. The gallbladder was then placed in the EndoCatch bag and removed through the umbilical trocar site. The gallbladder was then sent to Pathology. Next, the right upper quadrant was inspected. The gallbladder fossa appeared dry. There was no evidence of any bleeding. The cystic duct remnant had 2 clips across it, and an Endoloop, with no evidence of any leakage of bile. The cystic artery remnant was inspected. It had 2 clips across it with no evidence of any bleeding. The right upper quadrant was then irrigated with 300 mL of normal saline. The irrigation came back  clear. Attention was then focused on the umbilical trocar site, and the fascia at  this umbilical trocar site was closed with a transfascial sutures of 0  Vicryl. This was done using an Endo Close device under direct laparoscopic  vision. Once this suture was tied, there was no loss of pneumoperitoneum through the umbilical trocar site and no palpable defect, indicating adequate closure of the trocar site. Pneumoinsufflation was then vented through the 5-mm trocar sites. The trocars were then removed and the skin at all trocar sites closed with 4-0 Monocryl and Dermabond. The patient was extubated in the room and taken to the [id number] [place] [address]  Unit in stable condition. Instrument, sponge, and needle counts were correct x2. Findings:  Cholecystitis with Cholelithiasis  Cholangiogram with distal CBD stones that could not be cleared with glucagon and CBD irrigation.          Estimated Blood Loss:  Minimal           Drains:    [REMOVED] Orogastric Tube 04/26/23 (Removed)              Specimens: Gallbladder             Complications:  None; patient tolerated the procedure well.          Implants: * No implants in log *    Disposition: PACU - hemodynamically stable.            Condition: stable      Electronically Signed by Aakash Dobson MD on 4/26/2023 at 10:31 PM

## 2023-04-27 NOTE — PROGRESS NOTES
Progress Note    Patient: Brittany Ngo MRN: [id number]  SSN: [id number]    YOB: 1995  Age: 32 y.o. Sex: female      Admit Date: 4/26/2023    LOS: 0 days     Subjective:     Patient complains of mild incisional pain. Otherwise no nausea. She is hungry. Had several questions regarding ERCP. Objective:     Vitals:    04/27/23 0100 04/27/23 0200 04/27/23 0813 04/27/23 1157   BP: 108/60 (P) 106/66 130/72 (!) 141/88   Pulse: 72 (P) 67 (!) 58 71   Resp: 16 (P) 15 18 15   Temp: 98.1 °F (36.7 °C) (P) 98.6 °F (37 °C) 97.9 °F (36.6 °C)    SpO2: 100% (P) 100% 98% 100%   Weight:       Height:            Intake and Output:  Current Shift: No intake/output data recorded. Last three shifts: 04/25 1901 - 04/27 0700  In: 820 [I.V.:820]  Out: -     Physical Exam:   General: Well-appearing, no acute distress  HEENT: Extraocular muscles intact, trachea midline, normal range of motion  Lungs: Normal work of breathing, nontachypneic  Heart: Regular rate and rhythm  Abdomen: Soft, nondistended, appropriately tender over incisions. Nonperitoneal.  Extremities: Warm, well-perfused  Neuro: Grossly intact  Psych: Appropriate      Lab/Data: Reviewed    Assessment:     Active Problems:    Acute cholecystitis (4/26/2023)    POD 1 status post laparoscopic cholecystectomy for acute cholecystitis from which she is recovering well. Positive IOC with plans for ERCP later today.     Plan:     Keep n.p.o. until ERCP  Clears after ERCP  Possibly home tomorrow    Signed By: Mikayla Glasgow MD     April 27, 2023

## 2023-04-27 NOTE — PROGRESS NOTES
1240 - Patient still PAU. [facility] will follow up to deliver  [facility] with patient. Attempted to deliver and verbally explain the  [facility] with patient. Patient was PAU.  Anthony Lundy, Care Management Assistant

## 2023-04-27 NOTE — PERIOP NOTES
TRANSFER - OUT REPORT:    Verbal report given Elizabeth Green RN(name) on Nella Sensor  being transferred to [facility](unit) for routine post - op       Report consisted of patients Situation, Background, Assessment and   Recommendations(SBAR). Information from the following report(s) SBAR, Kardex, OR Summary, Intake/Output, MAR, Recent Results, and Med Rec Status was reviewed with the receiving nurse. Opportunity for questions and clarification was provided.       Patient transported with:   O2 @ 2 liters  Registered Nurse

## 2023-04-27 NOTE — ANESTHESIA PREPROCEDURE EVALUATION
Relevant Problems   GASTROINTESTINAL   (+) GERD (gastroesophageal reflux disease)       Anesthetic History   No history of anesthetic complications            Review of Systems / Medical History  Patient summary reviewed, nursing notes reviewed and pertinent labs reviewed    Pulmonary  Within defined limits                 Neuro/Psych         Psychiatric history     Cardiovascular  Within defined limits                Exercise tolerance: >4 METS     GI/Hepatic/Renal     GERD           Endo/Other        Obesity     Other Findings   Comments: Learning disability           Physical Exam    Airway  Mallampati: II  TM Distance: 4 - 6 cm  Neck ROM: normal range of motion        Cardiovascular  Regular rate and rhythm,  S1 and S2 normal,  no murmur, click, rub, or gallop             Dental  No notable dental hx       Pulmonary  Breath sounds clear to auscultation               Abdominal  GI exam deferred       Other Findings            Anesthetic Plan    ASA: 2  Anesthesia type: general    Monitoring Plan: BIS      Induction: Intravenous  Anesthetic plan and risks discussed with: Patient

## 2023-04-27 NOTE — PROGRESS NOTES
TRANSFER - IN REPORT:    Verbal report received from GemaRN and NELL Warner CRNA(name) on Celestina Fabian  being received from endo(unit) for routine post - op      Report consisted of patients Situation, Background, Assessment and   Recommendations(SBAR). Information from the following report(s) SBAR, Kardex, OR Summary, and MAR was reviewed with the receiving nurse. Opportunity for questions and clarification was provided. Assessment completed upon patients arrival to unit and care assumed.

## 2023-04-27 NOTE — CONSULTS
GI CONSULTATION NOTE  Titus Doll NP   [phone] NP in-hospital cell phone M-F until 4:30  After 5pm or on weekends, please call  for physician on call    NAME: Easton Pickens   :  1995   MRN:  [id number]   Attending: Dr. Rex Jackson  Primary GI: Dr. Chio Amaro  Date/Time:  2023 10:44 AM  Assessment:   -Choledocholithiasis, s/p choley with positive IOC  IOC showing distal CBD stones that could not be cleared with glucagon and CBD irrigation  -Dyspepsia, on PPI    Plan:   -Plan for ERCP today at 1pm with Dr. Chio Amaro  -NPO  -Continue PPI  -Supportive measures per primary team.   -Serial H&H as clinically indicated. Goal hemoglobin >7  -Avoid NSAIDs  -Will continue to follow. Please call GI with any further questions or concerns. Thank you! Plan discussed with Dr. Chio Amaro  Subjective:     HISTORY OF PRESENT ILLNESS:     Easton Pickens is an 32 y.o.  female who we are asked to see for complaint of S/P lap mehran, needs ercp. Reports had gallbladder removed yesterday. Since then no abdominal pain. No nausea or vomiting. Is very hungry. Has some symptoms of dyspepsia. No blood thinners. Nothing to eat or drink today, just sips of water with meds. Had some concern that her urine was yellow, was concerned that might not be normal. Wants to eat and wants to go home today if possible. Presented to ER with RUQ pain, described it as sharp and stabbing. Had some nausea and vomiting. Had symptoms for 24-48 hours prior to coming to ER. Palpation made it worse.       Past Medical History:   Diagnosis Date    Depression     Anxiety    Encounter for Papanicolaou smear for cervical cancer screening 2021    neg/hpv not tested    LANCE (generalized anxiety disorder)     GERD (gastroesophageal reflux disease)     Hearing loss, left     Learning disability     reading    Obesity (BMI 35.0-39.9 without comorbidity)       Past Surgical History:   Procedure Laterality Date    HX OTHER SURGICAL  2019    wisdom teeth     Social History     Tobacco Use    Smoking status: Never    Smokeless tobacco: Never   Substance Use Topics    Alcohol use: Never      Family History   Problem Relation Age of Onset    No Known Problems Mother     No Known Problems Father     Diabetes Paternal Grandmother     No Known Problems Half-sister     No Known Problems Half-brother     No Known Problems Half-brother       Allergies   Allergen Reactions    Amoxicillin Hives    Grass Pollen Hives and Rash    Kiwi Hives    Penicillins Hives      Prior to Admission medications    Medication Sig Start Date End Date Taking? Authorizing Provider   FLUoxetine (PROzac) 40 mg capsule Take 1 Capsule by mouth daily for 30 days. 4/24/23 5/24/23 Yes Mil Garland MD   pantoprazole (PROTONIX) 40 mg tablet Take 1 Tablet by mouth daily. 3/19/23  Yes Monet Hernandez MD   norethindrone-ethinyl estradiol (JUNEL FE 1/20) 1 mg-20 mcg (21)/75 mg (7) tab Take 1 Tablet by mouth daily. 2/12/23  Yes Mil Garland MD       Patient Active Problem List   Diagnosis Code    Depression F32. A    Learning disability F81.9    Hearing impairment H91.90    GERD (gastroesophageal reflux disease) K21.9    Encounter for induction of labor Z34.90    Epigastric pain R10.13    Acute cholecystitis K81.0       REVIEW OF SYSTEMS:    Constitutional: negative fever, negative chills, negative weight loss  Eyes:   negative visual changes  ENT:   negative sore throat, tongue or lip swelling   Respiratory:  negative cough, negative dyspnea  Cards:  negative for chest pain, palpitations, lower extremity edema  GI:   See HPI  :  negative for frequency, dysuria  Integument:  negative for rash and pruritus  Heme:  negative for easy bruising and gum/nose bleeding  Musculoskel: negative for myalgias,  back pain and muscle weakness  Neuro: negative for headaches, dizziness, vertigo  Psych: negative for feelings of anxiety, depression     Pertinent Positives: dyspepsia, no other current GI complaints      Objective:   VITALS:    Visit Vitals  /72   Pulse (!) 58   Temp 97.9 °F (36.6 °C)   Resp 18   Ht 5' 5\" (1.651 m)   Wt 97.1 kg (214 lb)   SpO2 98%   BMI 35.61 kg/m²       PHYSICAL EXAM:   General:          Alert, WD, WN, cooperative, no distress, appears stated age. Head:               Normocephalic, without obvious abnormality, atraumatic. Eyes:               Conjunctivae clear and pale, anicteric sclerae. Pupils are equal  Nose:               Nares normal. No drainage. Throat:             Lips, mucosa, and tongue normal.   Neck:               Supple, symmetrical,   Back:               Symmetric,   Lungs:             CTA bilaterally. No wheezing/rhonchi/rales. Chest wall:      No deformity. No Accessory muscle use. Heart:              Regular rate and rhythm,  no rub or gallop. Abdomen:        Soft, mildly tender. Not distended. Bowel sounds normal. No masses  Extremities:     Atraumatic, No cyanosis. No edema. No clubbing  Skin:                Texture, turgor normal. No rashes/lesions/jaundice  Psych:             Mildly anxious, not agitated. Neurologic:      EOMs intact. No facial asymmetry. No aphasia or slurred speech. Normal                        strength, A/O X 3. LAB DATA REVIEWED:    Recent Results (from the past 24 hour(s))   CBC WITH AUTOMATED DIFF    Collection Time: 04/26/23 12:29 PM   Result Value Ref Range    WBC 8.2 3.6 - 11.0 K/uL    RBC 4.87 3.80 - 5.20 M/uL    HGB 14.1 11.5 - 16.0 g/dL    HCT 41.6 35.0 - 47.0 %    MCV 85.4 80.0 - 99.0 FL    MCH 29.0 26.0 - 34.0 PG    MCHC 33.9 30.0 - 36.5 g/dL    RDW 13.4 11.5 - 14.5 %    PLATELET 236 812 - 256 K/uL    MPV 10.3 8.9 - 12.9 FL    NRBC 0.0 0  WBC    ABSOLUTE NRBC 0.00 0.00 - 0.01 K/uL    NEUTROPHILS 70 32 - 75 %    LYMPHOCYTES 26 12 - 49 %    MONOCYTES 4 (L) 5 - 13 %    EOSINOPHILS 0 0 - 7 %    BASOPHILS 0 0 - 1 %    IMMATURE GRANULOCYTES 0 0.0 - 0.5 %    ABS.  NEUTROPHILS 5.6 1.8 - 8.0 K/UL    ABS. LYMPHOCYTES 2.2 0.8 - 3.5 K/UL    ABS. MONOCYTES 0.4 0.0 - 1.0 K/UL    ABS. EOSINOPHILS 0.0 0.0 - 0.4 K/UL    ABS. BASOPHILS 0.0 0.0 - 0.1 K/UL    ABS. IMM. GRANS. 0.0 0.00 - 0.04 K/UL    DF AUTOMATED     METABOLIC PANEL, COMPREHENSIVE    Collection Time: 04/26/23 12:29 PM   Result Value Ref Range    Sodium 138 136 - 145 mmol/L    Potassium 4.1 3.5 - 5.1 mmol/L    Chloride 108 97 - 108 mmol/L    CO2 22 21 - 32 mmol/L    Anion gap 8 5 - 15 mmol/L    Glucose 108 (H) 65 - 100 mg/dL    BUN 8 6 - 20 MG/DL    Creatinine 0.83 0.55 - 1.02 MG/DL    BUN/Creatinine ratio 10 (L) 12 - 20      eGFR >60 >60 ml/min/1.73m2    Calcium 9.1 8.5 - 10.1 MG/DL    Bilirubin, total 2.1 (H) 0.2 - 1.0 MG/DL    ALT (SGPT) 153 (H) 12 - 78 U/L    AST (SGOT) 140 (H) 15 - 37 U/L    Alk. phosphatase 192 (H) 45 - 117 U/L    Protein, total 7.8 6.4 - 8.2 g/dL    Albumin 3.6 3.5 - 5.0 g/dL    Globulin 4.2 (H) 2.0 - 4.0 g/dL    A-G Ratio 0.9 (L) 1.1 - 2.2     LIPASE    Collection Time: 04/26/23 12:29 PM   Result Value Ref Range    Lipase 173 73 - 393 U/L   URINALYSIS W/ RFLX MICROSCOPIC    Collection Time: 04/26/23  1:52 PM   Result Value Ref Range    Color YELLOW/STRAW      Appearance CLEAR CLEAR      Specific gravity 1.009      pH (UA) 8.0 5.0 - 8.0      Protein Negative NEG mg/dL    Glucose Negative NEG mg/dL    Ketone 15 (A) NEG mg/dL    Bilirubin Negative NEG      Blood Negative NEG      Urobilinogen 1.0 0.2 - 1.0 EU/dL    Nitrites Negative NEG      Leukocyte Esterase TRACE (A) NEG      WBC 5-10 0 - 4 /hpf    RBC 0-5 0 - 5 /hpf    Epithelial cells MANY (A) FEW /lpf    Bacteria 1+ (A) NEG /hpf    Hyaline cast 0-2 0 - 2 /lpf   URINE CULTURE HOLD SAMPLE    Collection Time: 04/26/23  1:52 PM    Specimen: Urine   Result Value Ref Range    Urine culture hold        Urine on hold in Microbiology dept for 2 days. If unpreserved urine is submitted, it cannot be used for addtional testing after 24 hours, recollection will be required.    HCG URINE, QL. - POC    Collection Time: 04/26/23  1:54 PM   Result Value Ref Range    Pregnancy test,urine (POC) Negative NEG     HCG URINE, QL. - POC    Collection Time: 04/26/23  5:40 PM   Result Value Ref Range    Pregnancy test,urine (POC) Negative NEG     CBC W/O DIFF    Collection Time: 04/27/23 12:20 AM   Result Value Ref Range    WBC 15.0 (H) 3.6 - 11.0 K/uL    RBC 4.34 3.80 - 5.20 M/uL    HGB 12.8 11.5 - 16.0 g/dL    HCT 37.7 35.0 - 47.0 %    MCV 86.9 80.0 - 99.0 FL    MCH 29.5 26.0 - 34.0 PG    MCHC 34.0 30.0 - 36.5 g/dL    RDW 13.6 11.5 - 14.5 %    PLATELET 405 009 - 507 K/uL    MPV 10.5 8.9 - 12.9 FL    NRBC 0.0 0  WBC    ABSOLUTE NRBC 0.00 0.00 - 0.68 K/uL   METABOLIC PANEL, COMPREHENSIVE    Collection Time: 04/27/23 12:20 AM   Result Value Ref Range    Sodium 140 136 - 145 mmol/L    Potassium 3.8 3.5 - 5.1 mmol/L    Chloride 111 (H) 97 - 108 mmol/L    CO2 21 21 - 32 mmol/L    Anion gap 8 5 - 15 mmol/L    Glucose 161 (H) 65 - 100 mg/dL    BUN 8 6 - 20 MG/DL    Creatinine 0.85 0.55 - 1.02 MG/DL    BUN/Creatinine ratio 9 (L) 12 - 20      eGFR >60 >60 ml/min/1.73m2    Calcium 7.9 (L) 8.5 - 10.1 MG/DL    Bilirubin, total 2.8 (H) 0.2 - 1.0 MG/DL    ALT (SGPT) 234 (H) 12 - 78 U/L    AST (SGOT) 176 (H) 15 - 37 U/L    Alk.  phosphatase 189 (H) 45 - 117 U/L    Protein, total 6.5 6.4 - 8.2 g/dL    Albumin 3.2 (L) 3.5 - 5.0 g/dL    Globulin 3.3 2.0 - 4.0 g/dL    A-G Ratio 1.0 (L) 1.1 - 2.2         IMAGING RESULTS:  I have personally reviewed the imaging reports      Total time spent with patient: 60 minutes ________________________________________________________________________  Care Plan discussed with:  Patient Y   Family     RN Y              Consultant:       CT  4/27/2023:  ________________________________________________________________________    ___________________________________________________  Consulting Provider: Matt Roberson NP      4/27/2023  10:44 AM

## 2023-04-27 NOTE — PROGRESS NOTES
Patient currently being transferred to PACU post procedure by Scott Ayala, VICKI and Carola Goltz, CRNA. Patient transferred with monitor, O2, and patient chart. Verbal report called to primary RN Sunil Ladd (name) on [address] [id number]. Information from the following report(s) SBAR, Procedure Summary, and MAR was reviewed with the receiving nurse. Informed primary RN that patient is being transferred to PACU before returning to floor.

## 2023-04-27 NOTE — PROGRESS NOTES
End of Shift Note    Bedside shift change report given to Any Santiago RN (oncoming nurse) by Jose Rubin RN (offgoing nurse).   Report included the following information SBAR and Kardex    Shift worked:  [id number]     Shift summary and any significant changes:    -ercp done  -no issues     Concerns for physician to address: none   Zone phone for oncoming shift:

## 2023-04-27 NOTE — PROGRESS NOTES
TRANSFER - OUT REPORT:    Verbal report given to VICKI Mena(name) on Rocco Phalen  being transferred to [facility](unit) for routine post - op       Report consisted of patients Situation, Background, Assessment and   Recommendations(SBAR). Information from the following report(s) SBAR, Kardex, OR Summary, and MAR was reviewed with the receiving nurse. Lines:   Peripheral IV 04/26/23 Right Antecubital (Active)   Site Assessment Clean, dry, & intact 04/27/23 1345   Phlebitis Assessment 0 04/27/23 1345   Infiltration Assessment 0 04/27/23 1117   Dressing Status Clean, dry, & intact 04/27/23 1117   Dressing Type Transparent 04/27/23 1117   Hub Color/Line Status Infusing 04/27/23 1117   Action Taken Open ports on tubing capped 04/27/23 1117   Alcohol Cap Used Yes 04/27/23 1117        Opportunity for questions and clarification was provided.       Patient transported with:   Registered Nurse

## 2023-04-27 NOTE — BRIEF OP NOTE
Brief Postoperative Note    Patient: Jefe Russell  YOB: 1995  MRN: [id number]    Date of Procedure: 4/26/2023     Pre-Op Diagnosis: Acute cholecystitis     Post-Op Diagnosis: Acute cholecystitis and Choledocolithasis       Procedure(s):  CHOLECYSTECTOMY LAPAROSCOPIC WITH INTRAOPERATIVE CHOLOANGIOGRAM    Surgeon(s):  Riaz Davis MD    Surgical Assistant: Surg Asst-1: Verl Caul    Anesthesia: General     Estimated Blood Loss (mL): Minimal    Complications: None    Specimens:   ID Type Source Tests Collected by Time Destination   1 : Gallbladder Preservative Gallbladder  Riaz Davis MD 4/26/2023 2210 Pathology        Implants: * No implants in log *    Drains:   [REMOVED] Orogastric Tube 04/26/23 (Removed)       Findings:  1) mildly inflamed gallbaldder 2) distal CBD stones that did not clear after glucagon and irrigation of the duct        Electronically Signed by Dennie Cliche, MD on 4/26/2023 at 10:29 PM

## 2023-04-27 NOTE — ANESTHESIA PREPROCEDURE EVALUATION
Relevant Problems   GASTROINTESTINAL   (+) GERD (gastroesophageal reflux disease)       Anesthetic History   No history of anesthetic complications            Review of Systems / Medical History  Patient summary reviewed, nursing notes reviewed and pertinent labs reviewed    Pulmonary  Within defined limits                 Neuro/Psych         Psychiatric history     Cardiovascular  Within defined limits                     GI/Hepatic/Renal     GERD           Endo/Other        Obesity     Other Findings   Comments: Learning disability           Physical Exam    Airway  Mallampati: II  TM Distance: 4 - 6 cm  Neck ROM: normal range of motion        Cardiovascular  Regular rate and rhythm,  S1 and S2 normal,  no murmur, click, rub, or gallop             Dental  No notable dental hx       Pulmonary  Breath sounds clear to auscultation               Abdominal  GI exam deferred       Other Findings            Anesthetic Plan    ASA: 2  Anesthesia type: general    Monitoring Plan: BIS      Induction: Intravenous  Anesthetic plan and risks discussed with: Patient

## 2023-04-27 NOTE — PROGRESS NOTES
Attempted to call patient's significant other Boni Mesa at [phone]. There was no answer and the mailbox has not been set up.

## 2023-04-27 NOTE — ANESTHESIA POSTPROCEDURE EVALUATION
Procedure(s):  CHOLECYSTECTOMY LAPAROSCOPIC WITH INTRAOPERATIVE CHOLOANGIOGRAM.    general    Anesthesia Post Evaluation      Multimodal analgesia: multimodal analgesia used between 6 hours prior to anesthesia start to PACU discharge  Patient location during evaluation: PACU  Level of consciousness: sleepy but conscious  Pain management: adequate  Airway patency: patent  Anesthetic complications: no  Cardiovascular status: acceptable  Respiratory status: acceptable  Hydration status: acceptable  Comments: +Post-Anesthesia Evaluation and Assessment    Patient: Amber Pierce MRN: [id number]  SSN: [id number]   YOB: 1995  Age: 32 y.o. Sex: female      Cardiovascular Function/Vital Signs    BP (P) 106/66   Pulse (P) 67   Temp (P) 37 °C (98.6 °F)   Resp (P) 15   Ht 5' 5\" (1.651 m)   Wt 97.1 kg (214 lb)   SpO2 (P) 100%   BMI 35.61 kg/m²     Patient is status post Procedure(s):  CHOLECYSTECTOMY LAPAROSCOPIC WITH INTRAOPERATIVE CHOLOANGIOGRAM.    Nausea/Vomiting: Controlled. Postoperative hydration reviewed and adequate. Pain:  Pain Scale 1: Numeric (0 - 10) (04/27/23 0100)  Pain Intensity 1: 5 (04/27/23 0100)   Managed. Neurological Status:   Neuro (WDL): Exceptions to WDL (04/26/23 2240)   At baseline. Mental Status and Level of Consciousness: Arousable. Pulmonary Status:   O2 Device: None (Room air) (04/26/23 2330)   Adequate oxygenation and airway patent. Complications related to anesthesia: None    Post-anesthesia assessment completed. No concerns.     Signed By: Qing Saldana MD    4/27/2023  Post anesthesia nausea and vomiting:  controlled  Final Post Anesthesia Temperature Assessment:  Normothermia (36.0-37.5 degrees C)      INITIAL Post-op Vital signs:   Vitals Value Taken Time   /60 04/27/23 0100   Temp 36.7 °C (98.1 °F) 04/27/23 0100   Pulse 72 04/27/23 0100   Resp 16 04/27/23 0100   SpO2 100 % 04/27/23 0100

## 2023-04-27 NOTE — PROCEDURES
NAME:  Brittany Ngo   :   1995   MRN:   [id number]     [facility]  Date/Time:  2023 1:10 PM    Procedure Type:   ERCP with biliary sphincterotomy, biliary stone removal     Indications: common bile duct stone  Pre-operative Diagnosis: see indication above  Post-operative Diagnosis:  See findings below  : Sandra Hannah MD  Referring Provider:    Ellen Cali MD, Ryan Richardson MD    Exam:  Airway: clear, no airway problems anticipated  Heart: RRR, without gallops or rubs  Lungs: clear bilaterally without wheezes, crackles, or rhonchi  Abdomen: soft, nontender, nondistended, bowel sounds present  Mental Status: awake, alert and oriented to person, place and time    Sedation:  MAC anesthesia Propofol  Medication: Levofloxacin 500 mg IV, Indomethacin 100 mg DC  Procedure Details:  After informed consent was obtained with all risks and benefits of procedure explained, the patient was taken to the fluoroscopy suite and placed in the prone position. Upon sequential sedation as per above, the Olympus duodenoscope [id number]   was inserted via the mouthpeice and carefully advanced to the second portion of the duodenum. The quality of visualization was good. The duodenoscope was withdrawn into a short position. Findings:   Endoscopic: Grossly normal esophagus, stomach, and duodenum when viewed with a duodenoscope    Ampulla: Normal    On first touch, wire guided cannulation was achieved with a 0.025 Jagwire through the Jagtome    Cholangiogram:   Clips in RUQ consistent with cholecystectomy  Small filling defects in distal CBD consistent with stones as noted on prior IOC  NO evidence of bile leak, even with balloon occlusion    Specimen Removed:  None    Complications: None. EBL:  None. Interventions:    Biliary:   Sphincterotomy performed  Balloon sweeps performed with removal of four CBD stones.  The CBD was confirmed clear on further balloon sweeps    The pancreatic duct was not cannulated or opacified    Impression:    Clips in RUQ consistent with cholecystectomy  Small filling defects in distal CBD consistent with stones as noted on prior IOC  NO evidence of bile leak, even with balloon occlusion  Sphincterotomy performed  Balloon sweeps performed with removal of four CBD stones.  The CBD was confirmed clear on further balloon sweeps    Recommendations:    Can have clear liquid diet at 3 pm if no significant abdominal pain  Trend LFT's      Discharge Disposition:  Back to floor following recovery in PACU    Discussed above with patient's Himanshu marlow MD

## 2023-04-27 NOTE — ANESTHESIA POSTPROCEDURE EVALUATION
Procedure(s):  ENDOSCOPIC RETROGRADE CHOLANGIOPANCREATOGRAPHY (ERCP)  SPHINCTEROTOMY  ENDOSCOPIC STONE EXTRACTION/BALLOON SWEEP. general    Anesthesia Post Evaluation        Patient location during evaluation: PACU  Note status: Adequate. Level of consciousness: responsive to verbal stimuli and sleepy but conscious  Pain management: satisfactory to patient  Airway patency: patent  Anesthetic complications: no  Cardiovascular status: acceptable  Respiratory status: acceptable  Hydration status: acceptable  Comments: +Post-Anesthesia Evaluation and Assessment    Patient: Valentina Tang MRN: [id number]  SSN: [id number]   YOB: 1995  Age: 32 y.o. Sex: female      Cardiovascular Function/Vital Signs    /78   Pulse 67   Temp 37.1 °C (98.7 °F)   Resp 12   Ht 5' 5\" (1.651 m)   Wt 97.1 kg (214 lb)   SpO2 99%   Breastfeeding No   BMI 35.61 kg/m²     Patient is status post Procedure(s):  ENDOSCOPIC RETROGRADE CHOLANGIOPANCREATOGRAPHY (ERCP)  SPHINCTEROTOMY  ENDOSCOPIC STONE EXTRACTION/BALLOON SWEEP. Nausea/Vomiting: Controlled. Postoperative hydration reviewed and adequate. Pain:  Pain Scale 1: Numeric (0 - 10) (04/27/23 1340)  Pain Intensity 1: 2 (04/27/23 1340)   Managed. Neurological Status:   Neuro (WDL): Within Defined Limits (04/27/23 1324)   At baseline. Mental Status and Level of Consciousness: Arousable. Pulmonary Status:   O2 Device: None (Room air) (04/27/23 1340)   Adequate oxygenation and airway patent. Complications related to anesthesia: None    Post-anesthesia assessment completed. No concerns. Signed By: Raheel Eaton MD    4/27/2023  Post anesthesia nausea and vomiting:  controlled      INITIAL Post-op Vital signs:   Vitals Value Taken Time   /78 04/27/23 1345   Temp 37.1 °C (98.7 °F) 04/27/23 1324   Pulse 70 04/27/23 1346   Resp 11 04/27/23 1346   SpO2 100 % 04/27/23 1346   Vitals shown include unvalidated device data.

## 2023-04-28 VITALS
RESPIRATION RATE: 18 BRPM | OXYGEN SATURATION: 100 % | WEIGHT: 214 LBS | SYSTOLIC BLOOD PRESSURE: 115 MMHG | DIASTOLIC BLOOD PRESSURE: 76 MMHG | HEIGHT: 65 IN | BODY MASS INDEX: 35.65 KG/M2 | HEART RATE: 60 BPM | TEMPERATURE: 97.7 F

## 2023-04-28 LAB
ALBUMIN SERPL-MCNC: 3.2 G/DL (ref 3.5–5)
ALBUMIN/GLOB SERPL: 0.9 (ref 1.1–2.2)
ALP SERPL-CCNC: 210 U/L (ref 45–117)
ALT SERPL-CCNC: 370 U/L (ref 12–78)
AST SERPL-CCNC: 177 U/L (ref 15–37)
BILIRUB DIRECT SERPL-MCNC: 1.1 MG/DL (ref 0–0.2)
BILIRUB SERPL-MCNC: 2 MG/DL (ref 0.2–1)
ERYTHROCYTE [DISTWIDTH] IN BLOOD BY AUTOMATED COUNT: 13.7 % (ref 11.5–14.5)
GLOBULIN SER CALC-MCNC: 3.5 G/DL (ref 2–4)
HCT VFR BLD AUTO: 38.4 % (ref 35–47)
HGB BLD-MCNC: 12.4 G/DL (ref 11.5–16)
MCH RBC QN AUTO: 28.2 PG (ref 26–34)
MCHC RBC AUTO-ENTMCNC: 32.3 G/DL (ref 30–36.5)
MCV RBC AUTO: 87.3 FL (ref 80–99)
NRBC # BLD: 0 K/UL (ref 0–0.01)
NRBC BLD-RTO: 0 PER 100 WBC
PLATELET # BLD AUTO: 238 K/UL (ref 150–400)
PMV BLD AUTO: 10.4 FL (ref 8.9–12.9)
PROT SERPL-MCNC: 6.7 G/DL (ref 6.4–8.2)
RBC # BLD AUTO: 4.4 M/UL (ref 3.8–5.2)
WBC # BLD AUTO: 9.3 K/UL (ref 3.6–11)

## 2023-04-28 PROCEDURE — 74011000250 HC RX REV CODE- 250: Performed by: SURGERY

## 2023-04-28 PROCEDURE — 85027 COMPLETE CBC AUTOMATED: CPT

## 2023-04-28 PROCEDURE — 99024 POSTOP FOLLOW-UP VISIT: CPT | Performed by: NURSE PRACTITIONER

## 2023-04-28 PROCEDURE — 36415 COLL VENOUS BLD VENIPUNCTURE: CPT

## 2023-04-28 PROCEDURE — 74011250637 HC RX REV CODE- 250/637: Performed by: SURGERY

## 2023-04-28 PROCEDURE — 80076 HEPATIC FUNCTION PANEL: CPT

## 2023-04-28 PROCEDURE — G0378 HOSPITAL OBSERVATION PER HR: HCPCS

## 2023-04-28 PROCEDURE — 74011000250 HC RX REV CODE- 250: Performed by: INTERNAL MEDICINE

## 2023-04-28 RX ORDER — OXYCODONE HYDROCHLORIDE 5 MG/1
5 TABLET ORAL
Qty: 10 TABLET | Refills: 0 | Status: SHIPPED | OUTPATIENT
Start: 2023-04-28 | End: 2023-05-02

## 2023-04-28 RX ADMIN — FLUOXETINE 40 MG: 20 CAPSULE ORAL at 07:52

## 2023-04-28 RX ADMIN — SODIUM CHLORIDE, PRESERVATIVE FREE 10 ML: 5 INJECTION INTRAVENOUS at 05:49

## 2023-04-28 RX ADMIN — OXYCODONE 5 MG: 5 TABLET ORAL at 07:53

## 2023-04-28 RX ADMIN — PANTOPRAZOLE SODIUM 40 MG: 40 TABLET, DELAYED RELEASE ORAL at 07:53

## 2023-04-28 NOTE — DISCHARGE SUMMARY
Post- Surgical Discharge Summary    Patient ID:  Rosalio Choi  [id number]  female  32 y.o.  1995    Admit date: 4/26/2023    Discharge date: 04/28/23     Admitting Physician: Kranthi Hammond MD     Consulting Physician(s):   Treatment Team: Attending Provider: Thomas Tam MD; Consulting Provider: Thomas Tam MD; Consulting Provider: Sheila Serrato NP; Utilization Review: Alexandre Gustafson; Consulting Provider: Favian Carrasco MD; Primary Nurse: Deliliah Lesch, RN; Care Manager: Maura Morrison    Date of Surgery:   4/26/2023     Preoperative Diagnosis:  UNKNOWN    Postoperative Diagnosis:   ACUTE CHOLELITHIASIS     Procedure(s):  CHOLECYSTECTOMY LAPAROSCOPIC WITH INTRAOPERATIVE CHOLOANGIOGRAM     Anesthesia Type:   General     Surgeon: Thomas Tam MD                            HPI:  Pt is a 32 y.o. female who has a history of cholelithiasis who presents at this time for a lap mehran following the failure of conservative management. Problem List:   Problem List as of 4/28/2023 Date Reviewed: 4/27/2023            Codes Class Noted - Resolved    Acute cholecystitis ICD-10-CM: K81.0  ICD-9-CM: 575.0  4/26/2023 - Present        Epigastric pain ICD-10-CM: R10.13  ICD-9-CM: 789.06  11/11/2022 - Present        Encounter for induction of labor ICD-10-CM: Z34.90  ICD-9-CM: V22.1  6/14/2022 - Present        Depression ICD-10-CM: F32. A  ICD-9-CM: 772  Unknown - Present    Overview Signed 7/26/2021 10:07 AM by Dominick Higuera MD     Anxiety             Learning disability ICD-10-CM: F81.9  ICD-9-CM: 315.2  Unknown - Present    Overview Signed 7/26/2021 10:08 AM by Dominick Higuera MD     reading             GERD (gastroesophageal reflux disease) ICD-10-CM: K21.9  ICD-9-CM: 530.81  Unknown - Present        Hearing impairment ICD-10-CM: H91.90  ICD-9-CM: 389.9  11/29/2012 - Present    Overview Signed 6/14/2022  4:10 PM by Lizette Lunsford MD     Formatting of this note might be different from the original.  12  Hearing impaired in left ear. Arrives today with out hearing aide. Does not like to wear it. Hearing screen today demonstrates loss in Left ear. RESOLVED: 39 weeks gestation of pregnancy ICD-10-CM: Z3A.39  ICD-9-CM: V22.2  2022 - 2022        RESOLVED: Pregnancy affected by fetal growth restriction ICD-10-CM: O36.5990  ICD-9-CM: 656.50  2022 - 2022        RESOLVED: Prenatal care of primigravida, antepartum ICD-10-CM: Z34.00  ICD-9-CM: V22.0  2/15/2022 - 2022    Overview Addendum 2022  4:15 PM by Fernando Cedeno     Urine dip  mci vs velamentous: MFM , post plac  Elevated BP w MFM 3/31/2022   5/26 gbs neg  PIH Labs:  4/15/22: HGB 11.0, PLAT 119, CR 0.53, AST 4, ALT 26, urine pro/cr ratio 0.2 repeat plat 1 week, pt unable to come prior to  appt. Repeat cbc  -done  22: HGB 10.8  On  repeat PIH labs per MFM  Interval growth is adequate; however, fetus is now at the 15th percentile with an abdominal circumference at  the 5th percentile. We discussed that with the slowed growth velocity, I recommend weekly  surveillance. Transportation limitations: use service, ? IOL  IOL 6/15/22. TALIA OLEA. Will need to schedule if needed. Hospital Course: The patient underwent surgery. Intra-operative complications: None; patient tolerated the procedure well. Was taken to the PACU in stable condition and then transferred to the surgical floor. Pathology is pending. Surgeon will follow results as outpatient. Perioperative Antibiotics: Levaquin and Metronidazole    Postoperative Pain Management:  Oxycodone     Postoperative transfusions:    Number of units banked PRBCs =   none     Post Op complications: None     Incisions  - clean, dry and intact. No significant erythema or swelling. Wound(s) appear to be healing without any evidence of infection.       Patient mobilized with nursing and was found to be safe and steady with ambulation. Discharged to: Home     Condition on Discharge: Stable     Discharge instructions:    - Take pain medications as prescribed  - Diet Regular  - Discharge activity:    - Activity as tolerated    - Ambulate several times a day   - No heavy lifting for 4 weeks   - Do not drive for two weeks or while on opioid pain medications  - Wound Care: Keep wound(s) clean and dry. See discharge instruction sheet. Allergies: Allergies   Allergen Reactions    Amoxicillin Hives    Grass Pollen Hives and Rash    Kiwi Hives    Penicillins Hives              -DISCHARGE MEDICATION LIST     Current Discharge Medication List        START taking these medications    Details   oxyCODONE IR (ROXICODONE) 5 mg immediate release tablet Take 1 Tablet by mouth every six (6) hours as needed for Pain for up to 3 days. Max Daily Amount: 20 mg.  Qty: 10 Tablet, Refills: 0  Start date: 4/28/2023, End date: 5/1/2023    Associated Diagnoses: Cholecystitis           CONTINUE these medications which have NOT CHANGED    Details   FLUoxetine (PROzac) 40 mg capsule Take 1 Capsule by mouth daily for 30 days. Qty: 90 Capsule, Refills: 1      pantoprazole (PROTONIX) 40 mg tablet Take 1 Tablet by mouth daily. Qty: 30 Tablet, Refills: 0    Associated Diagnoses: Gastroesophageal reflux disease, unspecified whether esophagitis present      norethindrone-ethinyl estradiol (JUNEL FE 1/20) 1 mg-20 mcg (21)/75 mg (7) tab Take 1 Tablet by mouth daily. Qty: 3 Dose Pack, Refills: 3          per medical continuation form      -Follow up in office in 2 weeks      Signed:  Anne Marie Harley.  Ayanna Brush  MSN, APRN, FNP-C, [facility]  Surgical Nurse Practitioner    4/28/2023  10:09 AM

## 2023-04-28 NOTE — PROGRESS NOTES
GI PROGRESS NOTE  Naman Saldaña NP  [phone] NP in-hospital cell phone M-F until 4:30  After 5pm or on weekends, please call  for physician on call    NAME:Patricia Ellsworth :1995 TDE:[id number]   ATTG: Dr. Carlos Huizar   PCP: Ashish Grace MD  Date/Time:  2023 9:50 AM     Primary GI: Dr. Debby Bland    Reason for following: choledocholithiasis, s/p choley, dyspepsia, transaminitis    Assessment:     -Choledocholithiasis, s/p choley (post op day 1) with positive IOC  IOC showing distal CBD stones that could not be cleared with glucagon and CBD irrigation  S/p ERCP with removal of 4 stones  -Transaminitis  Increase in LFTs, but downtrend in Tbili  -Dyspepsia, on PPI     Plan:     -Increased elevation of LFTs, but ultimate downtrend in Tbili. Ok from GI standpoint for discharge. Needs follow up LFTs next week with PCP, if no PCP available can   -Diet as tolerated  -Continue PPI  -Supportive measures per primary team.   -Serial H&H as clinically indicated. Goal hemoglobin >7  -Avoid NSAIDs  -Will continue to follow. Please call GI with any further questions or concerns. Thank you! Plan discussed with Dr. Debby Bland     Subjective:   Patient resting comfortably in bed during visit. No nausea or vomiting. Mild and tolerable abdominal pain. Reports tolerating diet. Wants to go home. Dressed in home clothes. Objective:   VITALS:   Last 24hrs VS reviewed since prior progress note. Most recent are:  Visit Vitals  /76 (BP Patient Position: Sitting)   Pulse 60   Temp 97.7 °F (36.5 °C)   Resp 18   Ht 5' 5\" (1.651 m)   Wt 97.1 kg (214 lb)   SpO2 100%   Breastfeeding No   BMI 35.61 kg/m²       Intake/Output Summary (Last 24 hours) at 2023 0950  Last data filed at 2023 1524  Gross per 24 hour   Intake 2400 ml   Output --   Net 2400 ml     PHYSICAL EXAM:  General: WD, WN. Alert, cooperative, no acute distress    HEENT: NC, Atraumatic. Anicteric sclerae. Lungs:  CTA Bilaterally.  No Wheezing/Rhonchi/Rales. Heart:  Regular  rhythm, No Rubs, No Gallops  Abdomen: Soft, Non distended, Mildly tender. +Bowel sounds, no HSM. Scattered multiple abdominal incisions, clean/dry/intact with no drainage or erythema noted  Extremities: No c/c/e  Neurologic:  Alert and oriented X 3. No acute neurological distress   Psych:   Not anxious nor agitated. Lab and Radiology Data Reviewed: (see below)    Medications Reviewed: (see below)  PMH/SH reviewed - no change compared to H&P  ________________________________________________________________________  Total time spent with patient: 20 minutes ________________________________________________________________________  Care Plan discussed with:  Patient Y   Family     RN               Consultant:  Y- Surgical team     Emiliano Mcgrath NP     Procedures: see electronic medical records for all procedures/Xrays and details which were not copied into this note but were reviewed prior to creation of Plan. LABS:  Recent Labs     04/28/23 0415 04/27/23  0020   WBC 9.3 15.0*   HGB 12.4 12.8   HCT 38.4 37.7    211     Recent Labs     04/27/23  0020 04/26/23  1229    138   K 3.8 4.1   * 108   CO2 21 22   BUN 8 8   CREA 0.85 0.83   * 108*   CA 7.9* 9.1     Recent Labs     04/28/23  0415 04/27/23  0020 04/26/23  1229   * 189* 192*   TP 6.7 6.5 7.8   ALB 3.2* 3.2* 3.6   GLOB 3.5 3.3 4.2*   LPSE  --   --  173     No results for input(s): INR, PTP, APTT, INREXT in the last 72 hours. No results for input(s): FE, TIBC, PSAT, FERR in the last 72 hours. No results found for: FOL, RBCF  No results for input(s): PH, PCO2, PO2 in the last 72 hours. No results for input(s): CPK, CKMB in the last 72 hours.     No lab exists for component: TROPONINI  Lab Results   Component Value Date/Time    Color YELLOW/STRAW 04/26/2023 01:52 PM    Appearance CLEAR 04/26/2023 01:52 PM    Specific gravity 1.009 04/26/2023 01:52 PM    pH (UA) 8.0 04/26/2023 01:52 PM    Protein Negative 04/26/2023 01:52 PM    Glucose Negative 04/26/2023 01:52 PM    Ketone 15 (A) 04/26/2023 01:52 PM    Bilirubin Negative 04/26/2023 01:52 PM    Urobilinogen 1.0 04/26/2023 01:52 PM    Nitrites Negative 04/26/2023 01:52 PM    Leukocyte Esterase TRACE (A) 04/26/2023 01:52 PM    Epithelial cells MANY (A) 04/26/2023 01:52 PM    Bacteria 1+ (A) 04/26/2023 01:52 PM    WBC 5-10 04/26/2023 01:52 PM    RBC 0-5 04/26/2023 01:52 PM       MEDICATIONS:  Current Facility-Administered Medications   Medication Dose Route Frequency    sodium chloride (NS) flush 5-40 mL  5-40 mL IntraVENous Q8H    sodium chloride (NS) flush 5-40 mL  5-40 mL IntraVENous PRN    ondansetron (ZOFRAN) injection 4 mg  4 mg IntraVENous Q4H PRN    morphine injection 4 mg  4 mg IntraVENous Q4H PRN    FLUoxetine (PROzac) capsule 40 mg  40 mg Oral DAILY    pantoprazole (PROTONIX) tablet 40 mg  40 mg Oral DAILY    dextrose 5% - 0.45% NaCl with KCl 20 mEq/L infusion  125 mL/hr IntraVENous CONTINUOUS    sodium chloride (NS) flush 5-40 mL  5-40 mL IntraVENous Q8H    sodium chloride (NS) flush 5-40 mL  5-40 mL IntraVENous PRN    acetaminophen (TYLENOL) tablet 650 mg  650 mg Oral Q6H PRN    naloxone (NARCAN) injection 0.4 mg  0.4 mg IntraVENous PRN    ondansetron (ZOFRAN) injection 4 mg  4 mg IntraVENous Q4H PRN    diphenhydrAMINE (BENADRYL) injection 25 mg  25 mg IntraVENous Q6H PRN    oxyCODONE IR (ROXICODONE) tablet 5 mg  5 mg Oral Q4H PRN    HYDROmorphone (DILAUDID) injection 0.5 mg  0.5 mg IntraVENous Q2H PRN    hydrALAZINE (APRESOLINE) 20 mg/mL injection 10 mg  10 mg IntraVENous Q6H PRN    LORazepam (ATIVAN) injection 1 mg  1 mg IntraVENous Q6H PRN

## 2023-04-28 NOTE — PROGRESS NOTES
Patient is ready for d/c from CM standpoint    Transition of Care Plan:    RUR:OBS  Disposition:Home  If SNF or IPR: Date FOC offered:  Date FOC received:  Date authorization started with reference number:  Date authorization received and expires:  Accepting facility:  Follow up appointments:pt to schedule  DME needed:n/a  Transportation at [facility]note [facility] [address] or means to access home:        IM Medicare Letter:n/a  Is patient a  and connected with the [place]? If yes, was [employer] transfer form completed and VA notified? Caregiver Contact:pt contacted  Discharge Caregiver contacted prior to discharge? Care Conference needed?:          Patient is d/c home today without any needs.     Shanon Economy  Ext [phone]

## 2023-05-02 ENCOUNTER — VIRTUAL VISIT (OUTPATIENT)
Dept: FAMILY MEDICINE CLINIC | Age: 28
End: 2023-05-02
Payer: MEDICAID

## 2023-05-02 DIAGNOSIS — Z09 HOSPITAL DISCHARGE FOLLOW-UP: Primary | ICD-10-CM

## 2023-05-02 PROCEDURE — 1111F DSCHRG MED/CURRENT MED MERGE: CPT | Performed by: FAMILY MEDICINE

## 2023-05-09 SDOH — ECONOMIC STABILITY: FOOD INSECURITY: WITHIN THE PAST 12 MONTHS, YOU WORRIED THAT YOUR FOOD WOULD RUN OUT BEFORE YOU GOT MONEY TO BUY MORE.: SOMETIMES TRUE

## 2023-05-09 SDOH — ECONOMIC STABILITY: INCOME INSECURITY: HOW HARD IS IT FOR YOU TO PAY FOR THE VERY BASICS LIKE FOOD, HOUSING, MEDICAL CARE, AND HEATING?: NOT HARD AT ALL

## 2023-05-09 SDOH — ECONOMIC STABILITY: FOOD INSECURITY: WITHIN THE PAST 12 MONTHS, THE FOOD YOU BOUGHT JUST DIDN'T LAST AND YOU DIDN'T HAVE MONEY TO GET MORE.: SOMETIMES TRUE

## 2023-05-09 SDOH — ECONOMIC STABILITY: HOUSING INSECURITY
IN THE LAST 12 MONTHS, WAS THERE A TIME WHEN YOU DID NOT HAVE A STEADY PLACE TO SLEEP OR SLEPT IN A SHELTER (INCLUDING NOW)?: NO

## 2023-05-09 SDOH — ECONOMIC STABILITY: TRANSPORTATION INSECURITY
IN THE PAST 12 MONTHS, HAS LACK OF TRANSPORTATION KEPT YOU FROM MEETINGS, WORK, OR FROM GETTING THINGS NEEDED FOR DAILY LIVING?: NO

## 2023-05-10 ENCOUNTER — OFFICE VISIT (OUTPATIENT)
Age: 28
End: 2023-05-10

## 2023-05-10 VITALS
SYSTOLIC BLOOD PRESSURE: 130 MMHG | HEIGHT: 65 IN | TEMPERATURE: 96.8 F | WEIGHT: 210 LBS | DIASTOLIC BLOOD PRESSURE: 88 MMHG | OXYGEN SATURATION: 98 % | RESPIRATION RATE: 16 BRPM | HEART RATE: 79 BPM | BODY MASS INDEX: 34.99 KG/M2

## 2023-05-10 DIAGNOSIS — K80.67 CALCULUS OF GALLBLADDER AND BILE DUCT WITH ACUTE ON CHRONIC CHOLECYSTITIS, WITH OBSTRUCTION: Primary | ICD-10-CM

## 2023-05-10 PROCEDURE — 99024 POSTOP FOLLOW-UP VISIT: CPT | Performed by: SURGERY

## 2023-05-10 ASSESSMENT — PATIENT HEALTH QUESTIONNAIRE - PHQ9
SUM OF ALL RESPONSES TO PHQ QUESTIONS 1-9: 0
SUM OF ALL RESPONSES TO PHQ QUESTIONS 1-9: 0
SUM OF ALL RESPONSES TO PHQ9 QUESTIONS 1 & 2: 0
SUM OF ALL RESPONSES TO PHQ QUESTIONS 1-9: 0
SUM OF ALL RESPONSES TO PHQ QUESTIONS 1-9: 0
2. FEELING DOWN, DEPRESSED OR HOPELESS: 0
1. LITTLE INTEREST OR PLEASURE IN DOING THINGS: 0

## 2023-05-10 NOTE — PROGRESS NOTES
[facility]  ADVANCE PRACTICE EXAM & DAILY COMMUNICATION NOTE    Patient Active Problem List   Diagnosis     Prematurity, 30w0d GA     Need for observation and evaluation of  for sepsis     Malnutrition (H)     Pectus excavatum     VLBW baby (very low birth-weight baby) at 1470g     Breech birth       VITALS:  Temp:  [97.9  F (36.6  C)-99  F (37.2  C)] 98.4  F (36.9  C)  Heart Rate:  [156-186] 158  Resp:  [38-59] 38  BP: (81-89)/(49) 81/49  SpO2:  [97 %-100 %] 100 %      PHYSICAL EXAM:  Exam  General: Infant alert and active.  Skin: pink, warm, intact; no rashes or lesions noted.  HEENT: anterior fontanelle soft and flat.  Lungs: clear and equal bilaterally, no work of breathing.   Heart: normal rate, rhythm; no murmur noted; pulses 2+ in all four extremities.   Abdomen: soft with positive bowel sounds.  : normal male genitalia for gestational age.  Musculoskeletal: normal movement with full range of motion.  Neurologic: normal, symmetric tone and strength.    PO: 100%        Plan  discontinue NT  Consider Home in am    PCP: Caro Lees - Consider transfer of care when tolerates full feeds, off oxygen and >34 weeks  [facility] [id number]  [facility] [address]  Telephone [phone]  Fax [phone]        Gurinder MICHAEL CNNP MSN 10:13 AM, 2019                                      Identified pt with two pt identifiers (name and ). Reviewed chart in preparation for visit and have obtained necessary documentation. Orquidea Landis is a 29 y.o. female  Chief Complaint   Patient presents with    Follow-up     S/P lap shae     /88 (Site: Left Upper Arm, Position: Sitting, Cuff Size: Large Adult)   Pulse 79   Temp 96.8 °F (36 °C) (Temporal)   Resp 16   Ht 5' 5\" (1.651 m)   Wt 210 lb (95.3 kg)   LMP 04/10/2023 (Approximate)   SpO2 98%   BMI 34.95 kg/m²     1. Have you been to the ER, urgent care clinic since your last visit? Hospitalized since your last visit?no    2. Have you seen or consulted any other health care providers outside of the [address] [place] [address] since your last visit? Include any pap smears or colon screening.  no

## 2023-05-10 NOTE — PROGRESS NOTES
SUBJECTIVE: Anita Lyle is a 29 y.o. female is seen for a routine postop check. Patient is s/p lap shae with ioc with Dr. Stephany Velazco on 4/26 followed by ERCP with Dr. Jimbo Iyer for choledocholithiasis. Reports no problems with the wound or other issues. Activity, diet and bowels are normal. No pain. No fevers    Gallbladder, cholecystectomy:        Acute superimposed on chronic calculous cholecystitis. Gallbladder mucosa with cholesterolosis. OBJECTIVE:   Vitals:    05/10/23 1109   BP: 130/88   Pulse: 79   Resp: 16   Temp: 96.8 °F (36 °C)   SpO2: 98%         General: Appears well. Incision: healing well, no drainage, no erythema, no seroma, incision well approximated, no swelling    ASSESSMENT: normal postoperative course, doing well.     PLAN:   Wound care discussed  No heavy lifting for 6 weeks  Follow up PRN

## 2023-05-11 ENCOUNTER — TELEMEDICINE (OUTPATIENT)
Facility: CLINIC | Age: 28
End: 2023-05-11
Payer: MEDICAID

## 2023-05-11 DIAGNOSIS — K81.0 ACUTE CHOLECYSTITIS: ICD-10-CM

## 2023-05-11 DIAGNOSIS — Z09 HOSPITAL DISCHARGE FOLLOW-UP: Primary | ICD-10-CM

## 2023-05-11 PROCEDURE — 99213 OFFICE O/P EST LOW 20 MIN: CPT | Performed by: FAMILY MEDICINE

## 2023-05-11 PROCEDURE — 1111F DSCHRG MED/CURRENT MED MERGE: CPT | Performed by: FAMILY MEDICINE

## 2023-05-11 ASSESSMENT — ENCOUNTER SYMPTOMS
DIARRHEA: 0
NAUSEA: 0
VOMITING: 0
ABDOMINAL PAIN: 0

## 2023-05-11 ASSESSMENT — PATIENT HEALTH QUESTIONNAIRE - PHQ9
SUM OF ALL RESPONSES TO PHQ QUESTIONS 1-9: 0
SUM OF ALL RESPONSES TO PHQ QUESTIONS 1-9: 0
2. FEELING DOWN, DEPRESSED OR HOPELESS: 0
SUM OF ALL RESPONSES TO PHQ9 QUESTIONS 1 & 2: 0
SUM OF ALL RESPONSES TO PHQ QUESTIONS 1-9: 0
SUM OF ALL RESPONSES TO PHQ QUESTIONS 1-9: 0
1. LITTLE INTEREST OR PLEASURE IN DOING THINGS: 0

## 2023-05-11 NOTE — PROGRESS NOTES
Chief Complaint   Patient presents with    Follow-Up from Hospital     Postoperative Diagnosis:   ACUTE CHOLELITHIASIS   Pt confirms she did see the surgeon on yesterday, and everything went well

## 2023-05-11 NOTE — PROGRESS NOTES
Post-Discharge Transitional Care Follow Up      Usha Cuba   YOB: 1995    Date of Office Visit:  5/11/2023  Date of Hospital Admission: 3/10/23  Date of Hospital Discharge: 3/10/23  Readmission Risk Score(high >=14%. Medium >=10%):No data recorded    Care management risk score Rising risk (score 2-5) and Complex Care (Scores >=6): No Risk Score On File     Non face to face  following discharge, date last encounter closed (first attempt may have been earlier): *No documented post hospital discharge outreach found in the last 14 days     Call initiated 2 business days of discharge: *No response recorded in the last 14 days     Below is the assessment and plan developed based on review of pertinent history, physical exam, labs, studies, and medications. Hospital discharge follow-up  -     NC DISCHARGE MEDS RECONCILED W/ CURRENT OUTPATIENT MED LIST    Medical Decision Making: {TCMPN4:28741}  No follow-ups on file. {Time Documentation Optional:[id number]}     Subjective:   HPI    Inpatient course: Discharge summary reviewed- see chart. Interval history/Current status: ***    Patient Active Problem List   Diagnosis    Learning disability    GERD (gastroesophageal reflux disease)    Depression    Hearing impairment    Encounter for induction of labor    Epigastric pain    Acute cholecystitis       Medications listed as started at the time of discharge from hospital  Cannot display discharge medications since this is not an admission.           Medications marked \"taking\" at this time  Outpatient Medications Marked as Taking for the 5/11/23 encounter (Telemedicine) with Alejandra Mcleod MD   Medication Sig Dispense Refill    dicyclomine (BENTYL) 20 MG tablet Take 1 tablet by mouth every 6 hours as needed      FLUoxetine (PROZAC) 40 MG capsule Take 1 capsule by mouth daily 30 capsule 0    norethindrone-ethinyl estradiol (JUNEL FE 1/20) 1-20 MG-MCG per tablet Take 1 tablet by mouth daily

## 2023-05-11 NOTE — PROGRESS NOTES
Rocco Phalen (:  1995) is a Established patient, presenting virtually for evaluation of the following:    Assessment & Plan   Below is the assessment and plan developed based on review of pertinent history, physical exam, labs, studies, and medications. 1. Hospital discharge follow-up  -     NM DISCHARGE MEDS RECONCILED W/ CURRENT OUTPATIENT MED LIST  2. Acute cholecystitis    Return if symptoms worsen or fail to improve. Subjective   Patient presents with: Follow-Up from Hospital: Postoperative Diagnosis:   ACUTE CHOLELITHIASIS   Pt confirms she did see the surgeon on yesterday, and everything went well     Rocco Phalen is here for hospital follow up after being admitted 23 to 23 for acute cholecystitis. A laparoscopic cholecystectomy was done follow by ERCP with removal of 4 stones. Discharge summary reviewed. Currently, she is feeling back to normal.      Review of Systems   Constitutional:  Negative for chills and fever. Gastrointestinal:  Negative for abdominal pain, diarrhea, nausea and vomiting. Neurological:  Negative for dizziness. Objective   Patient-Reported Vitals  No data recorded     Physical Exam  Constitutional:       General: She is not in acute distress. Appearance: Normal appearance. Neurological:      Mental Status: She is alert and oriented to person, place, and time. Psychiatric:         Behavior: Behavior normal.            On this date 2023 I have spent over 20 minutes reviewing previous notes, test results and face to face (virtual) with the patient discussing the diagnosis and importance of compliance with the treatment plan as well as documenting on the day of the visit. Rocco Phalen, was evaluated through a synchronous (real-time) audio-video encounter. The patient (or guardian if applicable) is aware that this is a billable service, which includes applicable co-pays.  This Virtual Visit was conducted with patient's

## 2023-05-20 DIAGNOSIS — K21.9 GASTRO-ESOPHAGEAL REFLUX DISEASE WITHOUT ESOPHAGITIS: ICD-10-CM

## 2023-05-24 RX ORDER — PANTOPRAZOLE SODIUM 40 MG/1
TABLET, DELAYED RELEASE ORAL
Qty: 30 TABLET | Refills: 0 | Status: SHIPPED | OUTPATIENT
Start: 2023-05-24

## 2023-06-18 DIAGNOSIS — K21.9 GASTRO-ESOPHAGEAL REFLUX DISEASE WITHOUT ESOPHAGITIS: ICD-10-CM

## 2023-06-21 RX ORDER — PANTOPRAZOLE SODIUM 40 MG/1
TABLET, DELAYED RELEASE ORAL
Qty: 30 TABLET | Refills: 0 | Status: SHIPPED | OUTPATIENT
Start: 2023-06-21

## 2023-07-13 ENCOUNTER — PATIENT MESSAGE (OUTPATIENT)
Age: 28
End: 2023-07-13

## 2023-07-13 RX ORDER — NORETHINDRONE ACETATE AND ETHINYL ESTRADIOL 1MG-20(21)
1 KIT ORAL DAILY
Qty: 3 PACKET | Refills: 0 | Status: SHIPPED | OUTPATIENT
Start: 2023-07-13

## 2023-07-13 NOTE — TELEPHONE ENCOUNTER
From: Rod Schwartz  To: Dr. Louise Cobos: 7/13/2023 7:51 AM EDT  Subject: Please read    Hi I lost my birth control pills and can you send me a another in the pharmacy please

## 2023-07-17 DIAGNOSIS — K21.9 GASTRO-ESOPHAGEAL REFLUX DISEASE WITHOUT ESOPHAGITIS: ICD-10-CM

## 2023-07-20 RX ORDER — PANTOPRAZOLE SODIUM 40 MG/1
TABLET, DELAYED RELEASE ORAL
Qty: 30 TABLET | Refills: 0 | Status: SHIPPED | OUTPATIENT
Start: 2023-07-20

## 2023-08-15 ENCOUNTER — HOSPITAL ENCOUNTER (EMERGENCY)
Facility: HOSPITAL | Age: 28
Discharge: HOME | End: 2023-08-15
Attending: EMERGENCY MEDICINE

## 2023-08-15 VITALS
HEART RATE: 73 BPM | DIASTOLIC BLOOD PRESSURE: 92 MMHG | RESPIRATION RATE: 20 BRPM | SYSTOLIC BLOOD PRESSURE: 129 MMHG | OXYGEN SATURATION: 98 % | TEMPERATURE: 98.2 F | HEIGHT: 65 IN | BODY MASS INDEX: 33.39 KG/M2 | WEIGHT: 200.4 LBS

## 2023-08-15 ASSESSMENT — PAIN - FUNCTIONAL ASSESSMENT: PAIN_FUNCTIONAL_ASSESSMENT: NONE - DENIES PAIN
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
## 2023-08-15 NOTE — ED NOTES
Multiple attempts made to pull patient to triage by PA. Pt is not in waiting room.       Chata Bui RN  08/15/23 [id number]
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
## 2023-08-21 DIAGNOSIS — K21.9 GASTRO-ESOPHAGEAL REFLUX DISEASE WITHOUT ESOPHAGITIS: ICD-10-CM

## 2023-08-27 RX ORDER — PANTOPRAZOLE SODIUM 40 MG/1
TABLET, DELAYED RELEASE ORAL
Qty: 90 TABLET | Refills: 2 | Status: SHIPPED | OUTPATIENT
Start: 2023-08-27

## 2023-08-29 DIAGNOSIS — K21.9 GASTRO-ESOPHAGEAL REFLUX DISEASE WITHOUT ESOPHAGITIS: ICD-10-CM

## 2023-08-31 RX ORDER — PANTOPRAZOLE SODIUM 40 MG/1
40 TABLET, DELAYED RELEASE ORAL DAILY
Qty: 90 TABLET | Refills: 2 | OUTPATIENT
Start: 2023-08-31

## 2023-10-02 RX ORDER — FLUOXETINE HYDROCHLORIDE 40 MG/1
40 CAPSULE ORAL EVERY MORNING
Qty: 30 CAPSULE | Refills: 0 | OUTPATIENT
Start: 2023-10-02

## 2023-10-02 RX ORDER — FLUOXETINE HYDROCHLORIDE 40 MG/1
CAPSULE ORAL DAILY
Qty: 90 CAPSULE | Refills: 1 | OUTPATIENT
Start: 2023-10-02

## 2023-10-04 ENCOUNTER — TELEPHONE (OUTPATIENT)
Age: 28
End: 2023-10-04

## 2023-10-04 NOTE — TELEPHONE ENCOUNTER
29year old patient last seen in the office for ae on 10/2022 and was seen for mood follow up on 4/24/2023    Patient has next appointment on 3/27/2024 for ae    Please advise regarding pended rx request from the pharmacy    Please amend/sign    Thank you

## 2023-10-05 RX ORDER — FLUOXETINE HYDROCHLORIDE 40 MG/1
40 CAPSULE ORAL EVERY MORNING
Qty: 90 CAPSULE | Refills: 1 | Status: SHIPPED | OUTPATIENT
Start: 2023-10-05

## 2023-12-14 ENCOUNTER — PATIENT MESSAGE (OUTPATIENT)
Age: 28
End: 2023-12-14

## 2023-12-14 RX ORDER — NORETHINDRONE ACETATE AND ETHINYL ESTRADIOL 1MG-20(21)
1 KIT ORAL DAILY
Qty: 3 PACKET | Refills: 0 | OUTPATIENT
Start: 2023-12-14

## 2023-12-14 RX ORDER — FLUOXETINE HYDROCHLORIDE 40 MG/1
40 CAPSULE ORAL EVERY MORNING
Qty: 90 CAPSULE | Refills: 1 | OUTPATIENT
Start: 2023-12-14

## 2023-12-15 RX ORDER — NORETHINDRONE ACETATE AND ETHINYL ESTRADIOL 1MG-20(21)
1 KIT ORAL DAILY
Qty: 3 PACKET | Refills: 0 | Status: SHIPPED | OUTPATIENT
Start: 2023-12-15

## 2023-12-15 NOTE — TELEPHONE ENCOUNTER
From: Marilyn Rios  To: Dr. Ariana Hodge: 12/14/2023 9:54 AM EST  Subject: Please read    I need refills on birth control junel fe

## 2024-03-07 ENCOUNTER — PATIENT MESSAGE (OUTPATIENT)
Age: 29
End: 2024-03-07

## 2024-03-08 ENCOUNTER — PATIENT MESSAGE (OUTPATIENT)
Age: 29
End: 2024-03-08

## 2024-03-08 NOTE — TELEPHONE ENCOUNTER
From: Cherelle Soto  To: Dr. Anastasiya Weems  Sent: 3/7/2024 11:04 PM EST  Subject: Please read    My stomach hurts and when can I stop taking birth control Junel fe?

## 2024-03-08 NOTE — TELEPHONE ENCOUNTER
From: Cherelle Soto  To: Dr. Anastasiya Weems  Sent: 3/8/2024 2:33 PM EST  Subject: Please read    How does birth control patch work?

## 2024-03-27 ENCOUNTER — OFFICE VISIT (OUTPATIENT)
Age: 29
End: 2024-03-27
Payer: MEDICAID

## 2024-03-27 VITALS — WEIGHT: 207 LBS | BODY MASS INDEX: 34.45 KG/M2 | SYSTOLIC BLOOD PRESSURE: 116 MMHG | DIASTOLIC BLOOD PRESSURE: 78 MMHG

## 2024-03-27 DIAGNOSIS — Z01.419 ENCOUNTER FOR GYNECOLOGICAL EXAMINATION: Primary | ICD-10-CM

## 2024-03-27 DIAGNOSIS — R10.9 ABDOMINAL PAIN IN FEMALE: ICD-10-CM

## 2024-03-27 DIAGNOSIS — N89.8 VAGINAL DISCHARGE: ICD-10-CM

## 2024-03-27 LAB
HCG, PREGNANCY, URINE, POC: NEGATIVE
VALID INTERNAL CONTROL, POC: YES

## 2024-03-27 PROCEDURE — 81025 URINE PREGNANCY TEST: CPT | Performed by: OBSTETRICS & GYNECOLOGY

## 2024-03-27 PROCEDURE — 99395 PREV VISIT EST AGE 18-39: CPT | Performed by: OBSTETRICS & GYNECOLOGY

## 2024-03-27 RX ORDER — NORETHINDRONE ACETATE AND ETHINYL ESTRADIOL 1MG-20(21)
1 KIT ORAL DAILY
Qty: 3 PACKET | Refills: 4 | Status: SHIPPED | OUTPATIENT
Start: 2024-03-27

## 2024-03-27 NOTE — PROGRESS NOTES
Cherelle Soto is a 28 y.o. female returns for an annual exam     Chief Complaint   Patient presents with    Annual Exam       No LMP recorded.  Her periods are light in flow and usually regular with a 26-32 day interval with 3-7 day duration.  She does dysmenorrhea.  Problems: problems - vaginal discharge denies odor/itching; per patient ocp makes her stomach hurt  Birth Control: OCP (estrogen/progesterone).  Last Pap: see report obtained 2.5 year(s) ago.  She does not have a history of WILEY 2, 3 or cervical cancer.       1. Have you been to the ER, urgent care clinic, or hospitalized since your last visit? No    2. Have you seen or consulted any other health care providers outside of the [facility] since your last visit? No    Examination chaperoned by Shannan Pinto LPN.

## 2024-03-27 NOTE — PROGRESS NOTES
[facility]  [email or web address]  [phone]    Anastasiya Weems MD, FACOG        Annual Gynecologic Exam:  Chief Complaint   Patient presents with    Annual Exam       Cherelle Soto is a ,  28 y.o. female   No LMP recorded (lmp unknown).    The patient presents for her annual gynecologic checkup.     The patient is having problems - pt co cramping around belly button x 1 year, sometimes daily.  Does not take anything for it.   Nothing makes it better or worse  +Occ loose stools.  Missed one pill but otherwise wants to continue and is good at remembering them.   She does not know why bentyl is listed as a med.   .      Per Rooming Note:  No LMP recorded.  Her periods are light in flow and usually regular with a 26-32 day interval with 3-7 day duration.  She does dysmenorrhea.  Problems: problems - vaginal discharge denies odor/itching; per patient ocp makes her stomach hurt  Birth Control: OCP (estrogen/progesterone).  Last Pap: see report obtained 2.5 year(s) ago.  She does not have a history of WILEY 2, 3 or cervical cancer.        Sexual history and Contraception:    Social History     Substance and Sexual Activity   Sexual Activity Yes    Birth control/protection: None      Past Medical History:   Diagnosis Date    Depression     Anxiety    Encounter for Papanicolaou smear for cervical cancer screening 2021    neg/hpv not tested    DANIEL (generalized anxiety disorder)     GERD (gastroesophageal reflux disease)     Hearing loss, left     Learning disability     reading    Obesity (BMI 35.0-39.9 without comorbidity)      Current Outpatient Medications   Medication Sig Dispense Refill    norethindrone-ethinyl estradiol (JUNEL FE 1/20) 1-20 MG-MCG per tablet Take 1 tablet by mouth daily 3 packet 4    pantoprazole (PROTONIX) 40 MG tablet Take 1 tablet by mouth daily 90 tablet 2    FLUoxetine (PROZAC) 40 MG capsule TAKE 1 CAPSULE BY MOUTH EVERY DAY IN THE MORNING 90 capsule 1

## 2024-03-30 LAB
A VAGINAE DNA VAG QL NAA+PROBE: NORMAL SCORE
BVAB2 DNA VAG QL NAA+PROBE: NORMAL SCORE
C ALBICANS DNA VAG QL NAA+PROBE: NEGATIVE
C GLABRATA DNA VAG QL NAA+PROBE: NEGATIVE
C TRACH DNA VAG QL NAA+PROBE: NEGATIVE
MEGA1 DNA VAG QL NAA+PROBE: NORMAL SCORE
N GONORRHOEA DNA VAG QL NAA+PROBE: NEGATIVE
T VAGINALIS DNA VAG QL NAA+PROBE: NEGATIVE

## 2024-04-04 ENCOUNTER — TELEPHONE (OUTPATIENT)
Age: 29
End: 2024-04-04

## 2024-04-05 RX ORDER — FLUOXETINE HYDROCHLORIDE 40 MG/1
40 CAPSULE ORAL EVERY MORNING
Qty: 90 CAPSULE | Refills: 0 | Status: SHIPPED | OUTPATIENT
Start: 2024-04-05

## 2024-04-05 NOTE — TELEPHONE ENCOUNTER
MD reviewed signed and sent rx for 90 days. PT sent a [facility] message with MD recommendations:  Anastasiya Weems MD   to Me       4/5/24  1:16 PM  We can send 90 day supply.  But rec future mood follow up and refills with PCP since she is >>1 year postpartum.    Anastasiya Weems MD

## 2024-04-05 NOTE — TELEPHONE ENCOUNTER
29 yo last seen for ae 3/27/24      PT was last seen for this rx 4/24/23-plan was mood fu 6 mo with [facility].  10/11/23 ae was canceled and PT was rescheduled for ae and seen 3/27/24,  Rx was not sent at the ov.  Does PT still take this for mood dx?    Please advise    Thank you

## 2024-04-05 NOTE — TELEPHONE ENCOUNTER
28 year old patient last seen in the office on 3/27/2024 for ae      Requested refill last sent on 10/5/2023 for 6 months    ? Ok to refill has pended from the pharmacy      Please advise    Thank you

## 2024-06-17 ENCOUNTER — TELEPHONE (OUTPATIENT)
Age: 29
End: 2024-06-17

## 2024-06-17 RX ORDER — FLUOXETINE HYDROCHLORIDE 40 MG/1
40 CAPSULE ORAL EVERY MORNING
Qty: 90 CAPSULE | Refills: 0 | OUTPATIENT
Start: 2024-06-17

## 2024-06-17 NOTE — TELEPHONE ENCOUNTER
29 year old patient last seen in the office on 3/27/2024    Requested refill last filled on 4/5/2024    Patient has follow up ultrasound for pelvic pain on 7/16/2024    Please advise regarding refill    Thank you

## 2024-07-18 RX ORDER — FLUOXETINE HYDROCHLORIDE 40 MG/1
40 CAPSULE ORAL EVERY MORNING
Qty: 90 CAPSULE | Refills: 0 | OUTPATIENT
Start: 2024-07-18

## 2024-08-14 RX ORDER — FLUOXETINE HYDROCHLORIDE 40 MG/1
40 CAPSULE ORAL EVERY MORNING
Qty: 90 CAPSULE | Refills: 0 | OUTPATIENT
Start: 2024-08-14

## 2024-08-22 ENCOUNTER — PATIENT MESSAGE (OUTPATIENT)
Facility: CLINIC | Age: 29
End: 2024-08-22

## 2024-08-22 DIAGNOSIS — Z01.419 ENCOUNTER FOR GYNECOLOGICAL EXAMINATION: ICD-10-CM

## 2024-08-22 RX ORDER — FLUOXETINE HYDROCHLORIDE 40 MG/1
40 CAPSULE ORAL EVERY MORNING
Qty: 90 CAPSULE | Refills: 0 | OUTPATIENT
Start: 2024-08-22

## 2024-08-22 RX ORDER — NORETHINDRONE ACETATE AND ETHINYL ESTRADIOL 1MG-20(21)
1 KIT ORAL DAILY
Qty: 3 PACKET | Refills: 4 | OUTPATIENT
Start: 2024-08-22

## 2024-08-23 RX ORDER — FLUOXETINE HYDROCHLORIDE 40 MG/1
40 CAPSULE ORAL EVERY MORNING
Qty: 90 CAPSULE | Refills: 0 | Status: SHIPPED | OUTPATIENT
Start: 2024-08-23

## 2024-11-14 DIAGNOSIS — Z01.419 ENCOUNTER FOR GYNECOLOGICAL EXAMINATION: ICD-10-CM

## 2024-11-14 RX ORDER — FLUOXETINE 40 MG/1
40 CAPSULE ORAL EVERY MORNING
Qty: 90 CAPSULE | Refills: 0 | OUTPATIENT
Start: 2024-11-14

## 2024-11-14 RX ORDER — NORETHINDRONE ACETATE AND ETHINYL ESTRADIOL 1MG-20(21)
1 KIT ORAL DAILY
Qty: 3 PACKET | Refills: 4 | OUTPATIENT
Start: 2024-11-14

## 2024-11-14 NOTE — TELEPHONE ENCOUNTER
----- Message from Sal ADAMS sent at 11/14/2024 10:07 AM EST -----  Regarding: [facility] Appointment Request  [facility] Appointment Request    Patient needs appointment for [facility] Appointment Type: New Patient.    Patient Requested Dates(s): As soon as possible   Patient Requested Time: Afternoon  Provider Name: Janak Mccarthy MD    Reason for Appointment Request: New Patient - Available appointments did not meet patient need  --------------------------------------------------------------------------------------------------------------------------    Relationship to Patient: Self     Call Back Information: OK to leave message on voicemail  Preferred Call Back Number: Phone [phone]

## 2024-11-20 RX ORDER — FLUOXETINE 40 MG/1
40 CAPSULE ORAL EVERY MORNING
Qty: 90 CAPSULE | Refills: 0 | OUTPATIENT
Start: 2024-11-20

## 2024-12-09 NOTE — TELEPHONE ENCOUNTER
Pt has a NP appointment in March  [facility] faxed request    FLUOXETINE HCL 40 MG CAPSULE  Qty 90    Take 1 capsule by mouth every day in the morning

## 2024-12-09 NOTE — TELEPHONE ENCOUNTER
Patient was last seen by Dr. Benedict virtually back on 05/11/2023. Her new patient appt is on 03/04/2025. Are you able to send this refill?    Karthik

## 2024-12-10 RX ORDER — FLUOXETINE 40 MG/1
40 CAPSULE ORAL EVERY MORNING
Qty: 90 CAPSULE | Refills: 0 | Status: SHIPPED | OUTPATIENT
Start: 2024-12-10

## 2025-02-19 DIAGNOSIS — K21.9 GASTRO-ESOPHAGEAL REFLUX DISEASE WITHOUT ESOPHAGITIS: ICD-10-CM

## 2025-02-19 RX ORDER — PANTOPRAZOLE SODIUM 40 MG/1
40 TABLET, DELAYED RELEASE ORAL DAILY
Qty: 90 TABLET | Refills: 2 | OUTPATIENT
Start: 2025-02-19

## 2025-02-20 DIAGNOSIS — K21.9 GASTRO-ESOPHAGEAL REFLUX DISEASE WITHOUT ESOPHAGITIS: ICD-10-CM

## 2025-02-20 RX ORDER — PANTOPRAZOLE SODIUM 40 MG/1
40 TABLET, DELAYED RELEASE ORAL DAILY
Qty: 90 TABLET | Refills: 1 | Status: SHIPPED | OUTPATIENT
Start: 2025-02-20

## 2025-02-20 NOTE — TELEPHONE ENCOUNTER
[id number] faxed refill request for former pt of Dr. Bush with new pt appointment with Dr. Mccarthy 3/4/25:    Pantoprazole Sod DR 40 mg tablets  Sig: Take 1 tablet by mouth every day  Ldm

## 2025-03-01 SDOH — HEALTH STABILITY: PHYSICAL HEALTH
ON AVERAGE, HOW MANY DAYS PER WEEK DO YOU ENGAGE IN MODERATE TO STRENUOUS EXERCISE (LIKE A BRISK WALK)?: PATIENT DECLINED

## 2025-03-07 RX ORDER — FLUOXETINE HYDROCHLORIDE 40 MG/1
40 CAPSULE ORAL EVERY MORNING
Qty: 90 CAPSULE | Refills: 0 | Status: CANCELLED | OUTPATIENT
Start: 2025-03-07

## 2025-03-07 RX ORDER — FLUOXETINE HYDROCHLORIDE 40 MG/1
40 CAPSULE ORAL EVERY MORNING
Qty: 90 CAPSULE | Refills: 0 | Status: SHIPPED | OUTPATIENT
Start: 2025-03-07

## 2025-04-17 ENCOUNTER — OFFICE VISIT (OUTPATIENT)
Facility: CLINIC | Age: 30
End: 2025-04-17
Payer: MEDICAID

## 2025-04-17 VITALS
SYSTOLIC BLOOD PRESSURE: 134 MMHG | HEIGHT: 65 IN | WEIGHT: 207.2 LBS | OXYGEN SATURATION: 99 % | BODY MASS INDEX: 34.52 KG/M2 | HEART RATE: 82 BPM | DIASTOLIC BLOOD PRESSURE: 87 MMHG

## 2025-04-17 DIAGNOSIS — Z13.1 SCREENING FOR DIABETES MELLITUS: ICD-10-CM

## 2025-04-17 DIAGNOSIS — K21.9 GASTRO-ESOPHAGEAL REFLUX DISEASE WITHOUT ESOPHAGITIS: ICD-10-CM

## 2025-04-17 DIAGNOSIS — H91.92 HEARING LOSS OF LEFT EAR, UNSPECIFIED HEARING LOSS TYPE: ICD-10-CM

## 2025-04-17 DIAGNOSIS — F32.A ANXIETY AND DEPRESSION: ICD-10-CM

## 2025-04-17 DIAGNOSIS — F41.9 ANXIETY AND DEPRESSION: ICD-10-CM

## 2025-04-17 DIAGNOSIS — Z00.00 ANNUAL PHYSICAL EXAM: ICD-10-CM

## 2025-04-17 DIAGNOSIS — Z00.00 ANNUAL PHYSICAL EXAM: Primary | ICD-10-CM

## 2025-04-17 PROCEDURE — 99214 OFFICE O/P EST MOD 30 MIN: CPT

## 2025-04-17 PROCEDURE — 99395 PREV VISIT EST AGE 18-39: CPT

## 2025-04-17 RX ORDER — FLUOXETINE HYDROCHLORIDE 40 MG/1
40 CAPSULE ORAL EVERY MORNING
Qty: 90 CAPSULE | Refills: 0 | Status: SHIPPED | OUTPATIENT
Start: 2025-04-17

## 2025-04-17 RX ORDER — PANTOPRAZOLE SODIUM 40 MG/1
40 TABLET, DELAYED RELEASE ORAL DAILY
Qty: 90 TABLET | Refills: 1 | Status: SHIPPED | OUTPATIENT
Start: 2025-04-17

## 2025-04-17 SDOH — ECONOMIC STABILITY: FOOD INSECURITY: WITHIN THE PAST 12 MONTHS, YOU WORRIED THAT YOUR FOOD WOULD RUN OUT BEFORE YOU GOT MONEY TO BUY MORE.: NEVER TRUE

## 2025-04-17 SDOH — ECONOMIC STABILITY: FOOD INSECURITY: WITHIN THE PAST 12 MONTHS, THE FOOD YOU BOUGHT JUST DIDN'T LAST AND YOU DIDN'T HAVE MONEY TO GET MORE.: NEVER TRUE

## 2025-04-17 ASSESSMENT — PATIENT HEALTH QUESTIONNAIRE - PHQ9
7. TROUBLE CONCENTRATING ON THINGS, SUCH AS READING THE NEWSPAPER OR WATCHING TELEVISION: NOT AT ALL
SUM OF ALL RESPONSES TO PHQ QUESTIONS 1-9: 0
8. MOVING OR SPEAKING SO SLOWLY THAT OTHER PEOPLE COULD HAVE NOTICED. OR THE OPPOSITE, BEING SO FIGETY OR RESTLESS THAT YOU HAVE BEEN MOVING AROUND A LOT MORE THAN USUAL: NOT AT ALL
6. FEELING BAD ABOUT YOURSELF - OR THAT YOU ARE A FAILURE OR HAVE LET YOURSELF OR YOUR FAMILY DOWN: NOT AT ALL
1. LITTLE INTEREST OR PLEASURE IN DOING THINGS: NOT AT ALL
4. FEELING TIRED OR HAVING LITTLE ENERGY: NOT AT ALL
3. TROUBLE FALLING OR STAYING ASLEEP: NOT AT ALL
9. THOUGHTS THAT YOU WOULD BE BETTER OFF DEAD, OR OF HURTING YOURSELF: NOT AT ALL
SUM OF ALL RESPONSES TO PHQ QUESTIONS 1-9: 0
SUM OF ALL RESPONSES TO PHQ QUESTIONS 1-9: 0
5. POOR APPETITE OR OVEREATING: NOT AT ALL
10. IF YOU CHECKED OFF ANY PROBLEMS, HOW DIFFICULT HAVE THESE PROBLEMS MADE IT FOR YOU TO DO YOUR WORK, TAKE CARE OF THINGS AT HOME, OR GET ALONG WITH OTHER PEOPLE: NOT DIFFICULT AT ALL
2. FEELING DOWN, DEPRESSED OR HOPELESS: NOT AT ALL
SUM OF ALL RESPONSES TO PHQ QUESTIONS 1-9: 0

## 2025-04-17 NOTE — PROGRESS NOTES
Family Medicine Initial Office Visit  Patient: Cherelle Soto  1995, 29 y.o., female  Encounter Date: 4/17/2025      CHIEF COMPLAINT  Chief Complaint   Patient presents with    New Patient    Medication Refill       SUBJECTIVE  History of Present Illness  The patient is a 29-year-old female with a personal medical history of bipolar disorder, depression and anxiety, learning disability, left ear hearing loss, cholecystitis and obesity, who presents to the clinic to establish care. She previously followed with Dr. Stephanie Benedict and then Dr. Janak Mccarthy. Currently, she follows with OB/GYN, Dr. Weems.    She has a documented history of left ear hearing loss, which she attributes to severe ear infections at the age of 3 that necessitated the placement of tubes. Despite this, she has not sought follow-up care with an audiologist or undergone subsequent hearing tests. She reports no significant impact on her daily life due to this condition. Declined audiology referral.    She is currently under the care of Dr. Weems for obstetric and gynecological concerns and is on birth control medication. She does not recall the date of her last Pap smear but confirms annual visits to Dr. Weems. She recently received a letter indicating that Dr. Weems is no longer accepting new patients. She is a mother to a 2-year-old child and resides with her spouse and daughter. She underwent cholecystectomy following a diagnosis of cholecystitis.    She is seeking a refill of her fluoxetine 40 mg prescription, which she reports as being effective in managing her anxiety and depression. She has not engaged in therapy or counseling sessions recently.    FAMILY HISTORY  Her paternal grandmother had diabetes.    MEDICATIONS  Fluoxetine, pantoprazole, birth control      Review of Systems   All other systems reviewed and are negative.       Social History     Tobacco Use   Smoking Status Never   Smokeless Tobacco Never     Social History

## 2025-04-17 NOTE — PROGRESS NOTES
Chief Complaint   Patient presents with    New Patient       Subjective: Patient presented today as a new patient to establish primary care.  No other concerns.        \"Have you been to the ER, urgent care clinic since your last visit?  Hospitalized since your last visit?\"    NO    “Have you seen or consulted any other health care providers outside of [facility] since your last visit?”    NO     “Have you had a pap smear?”    YES, patient stated she is due for one.    Date of last Cervical Cancer screen (HPV or PAP): 12/13/2021             Click Here for Release of Records Request

## 2025-04-18 ENCOUNTER — RESULTS FOLLOW-UP (OUTPATIENT)
Facility: CLINIC | Age: 30
End: 2025-04-18

## 2025-04-18 DIAGNOSIS — Z01.419 ENCOUNTER FOR GYNECOLOGICAL EXAMINATION: ICD-10-CM

## 2025-04-18 LAB
ALBUMIN SERPL-MCNC: 3.7 G/DL (ref 3.5–5)
ALBUMIN/GLOB SERPL: 0.9 (ref 1.1–2.2)
ALP SERPL-CCNC: 109 U/L (ref 45–117)
ALT SERPL-CCNC: 27 U/L (ref 12–78)
ANION GAP SERPL CALC-SCNC: 6 MMOL/L (ref 2–12)
AST SERPL-CCNC: 22 U/L (ref 15–37)
BASOPHILS # BLD: 0.04 K/UL (ref 0–0.1)
BASOPHILS NFR BLD: 0.4 % (ref 0–1)
BILIRUB SERPL-MCNC: 0.8 MG/DL (ref 0.2–1)
BUN SERPL-MCNC: 12 MG/DL (ref 6–20)
BUN/CREAT SERPL: 14 (ref 12–20)
CALCIUM SERPL-MCNC: 9.3 MG/DL (ref 8.5–10.1)
CHLORIDE SERPL-SCNC: 107 MMOL/L (ref 97–108)
CHOLEST SERPL-MCNC: 190 MG/DL
CO2 SERPL-SCNC: 25 MMOL/L (ref 21–32)
CREAT SERPL-MCNC: 0.85 MG/DL (ref 0.55–1.02)
DIFFERENTIAL METHOD BLD: NORMAL
EOSINOPHIL # BLD: 0.3 K/UL (ref 0–0.4)
EOSINOPHIL NFR BLD: 3 % (ref 0–7)
ERYTHROCYTE [DISTWIDTH] IN BLOOD BY AUTOMATED COUNT: 13 % (ref 11.5–14.5)
EST. AVERAGE GLUCOSE BLD GHB EST-MCNC: 85 MG/DL
GLOBULIN SER CALC-MCNC: 3.9 G/DL (ref 2–4)
GLUCOSE SERPL-MCNC: 84 MG/DL (ref 65–100)
HBA1C MFR BLD: 4.6 % (ref 4–5.6)
HCT VFR BLD AUTO: 39.2 % (ref 35–47)
HDLC SERPL-MCNC: 49 MG/DL
HDLC SERPL: 3.9 (ref 0–5)
HGB BLD-MCNC: 13.1 G/DL (ref 11.5–16)
IMM GRANULOCYTES # BLD AUTO: 0.03 K/UL (ref 0–0.04)
IMM GRANULOCYTES NFR BLD AUTO: 0.3 % (ref 0–0.5)
LDLC SERPL CALC-MCNC: 115.8 MG/DL (ref 0–100)
LYMPHOCYTES # BLD: 3.05 K/UL (ref 0.8–3.5)
LYMPHOCYTES NFR BLD: 30.5 % (ref 12–49)
MCH RBC QN AUTO: 29.6 PG (ref 26–34)
MCHC RBC AUTO-ENTMCNC: 33.4 G/DL (ref 30–36.5)
MCV RBC AUTO: 88.5 FL (ref 80–99)
MONOCYTES # BLD: 0.53 K/UL (ref 0–1)
MONOCYTES NFR BLD: 5.3 % (ref 5–13)
NEUTS SEG # BLD: 6.05 K/UL (ref 1.8–8)
NEUTS SEG NFR BLD: 60.5 % (ref 32–75)
NRBC # BLD: 0 K/UL (ref 0–0.01)
NRBC BLD-RTO: 0 PER 100 WBC
PLATELET # BLD AUTO: 271 K/UL (ref 150–400)
PMV BLD AUTO: 10.8 FL (ref 8.9–12.9)
POTASSIUM SERPL-SCNC: 4.2 MMOL/L (ref 3.5–5.1)
PROT SERPL-MCNC: 7.6 G/DL (ref 6.4–8.2)
RBC # BLD AUTO: 4.43 M/UL (ref 3.8–5.2)
SODIUM SERPL-SCNC: 138 MMOL/L (ref 136–145)
TRIGL SERPL-MCNC: 126 MG/DL
VLDLC SERPL CALC-MCNC: 25.2 MG/DL
WBC # BLD AUTO: 10 K/UL (ref 3.6–11)

## 2025-04-18 RX ORDER — NORETHINDRONE ACETATE AND ETHINYL ESTRADIOL 1MG-20(21)
1 KIT ORAL DAILY
Qty: 3 PACKET | Refills: 4 | OUTPATIENT
Start: 2025-04-18

## 2025-04-30 ENCOUNTER — TELEPHONE (OUTPATIENT)
Age: 30
End: 2025-04-30

## 2025-04-30 DIAGNOSIS — Z01.419 ENCOUNTER FOR GYNECOLOGICAL EXAMINATION: ICD-10-CM

## 2025-04-30 RX ORDER — NORETHINDRONE ACETATE AND ETHINYL ESTRADIOL 1MG-20(21)
1 KIT ORAL DAILY
Qty: 3 PACKET | Refills: 0 | Status: SHIPPED | OUTPATIENT
Start: 2025-04-30

## 2025-04-30 NOTE — TELEPHONE ENCOUNTER
PT was called back, name and  verified    RN relayed MD reviewed message and pended Rx and sent 3 packs to get PT to her next annual.  PT verbalizes understanding.

## 2025-04-30 NOTE — TELEPHONE ENCOUNTER
PT name and  verified    30 yo last ov/ae 3/27/24, scheduled next ae for 25    PT calling stating she scheduled her ae for , but needs a refill for her ocp.   RN reviewed and relayed that 3 packs with 4 refills was sent 3/27/24, with exp date 3/27/25, so she should have enough if she picked up prior to that date, to make it to her ae.  PT states she does not know when she last refilled and when she tried the message she got was that the Rx had .  RN relayed the Rx would be pended to , as the current Rx last sent was by .    Rx pended to get PT to her ae 25  Former [facility] patient  Thank you

## 2025-06-19 ENCOUNTER — TELEPHONE (OUTPATIENT)
Age: 30
End: 2025-06-19

## 2025-06-19 DIAGNOSIS — Z01.419 ENCOUNTER FOR GYNECOLOGICAL EXAMINATION: Primary | ICD-10-CM

## 2025-06-19 RX ORDER — NORETHINDRONE ACETATE AND ETHINYL ESTRADIOL 1MG-20(21)
1 KIT ORAL DAILY
Qty: 3 PACKET | Refills: 0 | Status: SHIPPED | OUTPATIENT
Start: 2025-06-19

## 2025-06-19 NOTE — TELEPHONE ENCOUNTER
PT name and  verified    29 yo last ov/ae 3/27/24, canceled ae 25, rescheduled ae for 25    PT was transferred from scheduling after she canceled and rescheduled her ae with , and is seeing if she can get another pack of her ocp to get her to her rescheduled annual.    RN reviewed and PT had called 25 and scheduled her ae for  and asked for a refill of her ocp, former [facility] patient. 3 packs with 0 refills was sent.    PT states she finished a pack yesterday and is starting the last pack today, and will run out until the next scheduled ae date .  PT states she needs 1 pack.  RN relayed that  was out of the office on  and the message could be sent, and she would get a response . RN informed PT she would not be in the office so BS, RN would call her back once  reviewed.  PT would like MC message sent.        Please review request  Former [facility] patient  Thank you

## 2025-08-14 DIAGNOSIS — F41.9 ANXIETY AND DEPRESSION: ICD-10-CM

## 2025-08-14 DIAGNOSIS — F32.A ANXIETY AND DEPRESSION: ICD-10-CM

## 2025-08-14 RX ORDER — FLUOXETINE HYDROCHLORIDE 40 MG/1
40 CAPSULE ORAL EVERY MORNING
Qty: 90 CAPSULE | Refills: 0 | Status: SHIPPED | OUTPATIENT
Start: 2025-08-14

## 2025-08-28 DIAGNOSIS — Z01.419 ENCOUNTER FOR GYNECOLOGICAL EXAMINATION: ICD-10-CM

## 2025-08-28 DIAGNOSIS — F32.A ANXIETY AND DEPRESSION: ICD-10-CM

## 2025-08-28 DIAGNOSIS — F41.9 ANXIETY AND DEPRESSION: ICD-10-CM

## 2025-08-28 DIAGNOSIS — K21.9 GASTRO-ESOPHAGEAL REFLUX DISEASE WITHOUT ESOPHAGITIS: ICD-10-CM

## 2025-08-28 RX ORDER — NORETHINDRONE ACETATE AND ETHINYL ESTRADIOL 1MG-20(21)
1 KIT ORAL DAILY
Qty: 3 PACKET | Refills: 0 | OUTPATIENT
Start: 2025-08-28

## 2025-08-28 RX ORDER — NORETHINDRONE ACETATE AND ETHINYL ESTRADIOL 1MG-20(21)
1 KIT ORAL DAILY
Qty: 3 PACKET | Refills: 0 | Status: CANCELLED | OUTPATIENT
Start: 2025-08-28

## 2025-08-29 ENCOUNTER — TELEPHONE (OUTPATIENT)
Age: 30
End: 2025-08-29

## 2025-08-29 DIAGNOSIS — Z01.419 ENCOUNTER FOR GYNECOLOGICAL EXAMINATION: ICD-10-CM

## 2025-08-29 RX ORDER — FLUOXETINE HYDROCHLORIDE 40 MG/1
40 CAPSULE ORAL EVERY MORNING
Qty: 90 CAPSULE | Refills: 0 | Status: SHIPPED | OUTPATIENT
Start: 2025-08-29

## 2025-08-29 RX ORDER — PANTOPRAZOLE SODIUM 40 MG/1
40 TABLET, DELAYED RELEASE ORAL DAILY
Qty: 90 TABLET | Refills: 1 | Status: SHIPPED | OUTPATIENT
Start: 2025-08-29

## 2025-08-29 RX ORDER — NORETHINDRONE ACETATE AND ETHINYL ESTRADIOL 1MG-20(21)
1 KIT ORAL DAILY
Qty: 1 PACKET | Refills: 1 | Status: SHIPPED | OUTPATIENT
Start: 2025-08-29

## (undated) DEVICE — TROCAR: Brand: KII® SLEEVE

## (undated) DEVICE — ERCP CANNULA-TAPERED TIP: Brand: FLUORO TIP ERCP CANNULA

## (undated) DEVICE — E-Z CLEAN, PTFE COATED, ELECTROSURGICAL LAPAROSCOPIC ELECTRODE, L-HOOK, 33 CM., SINGLE-USE, FOR USE WITH HAND CONTROL PENCIL: Brand: MEGADYNE

## (undated) DEVICE — FCPS RAD JAW 4LC 240CM W/NDL -- BX/40

## (undated) DEVICE — GENERAL LAPAROSCOPY-MRMC: Brand: MEDLINE INDUSTRIES, INC.

## (undated) DEVICE — COVER,MAYO STAND,XL,STERILE: Brand: MEDLINE

## (undated) DEVICE — TISSUE RETRIEVAL SYSTEM: Brand: INZII RETRIEVAL SYSTEM

## (undated) DEVICE — SET CHOLANGIOGRAPHY 4FR L60CM W/ ARW KARLAN BLLN CATH CRV

## (undated) DEVICE — NEONATAL-ADULT SPO2 SENSOR: Brand: NELLCOR

## (undated) DEVICE — SYR 10ML LUER LOK 1/5ML GRAD --

## (undated) DEVICE — GUIDEWIRE ENDOSCP STD ANG HYDRPHLC L260CM OD035IN NAVIPRO

## (undated) DEVICE — DERMABOND SKIN ADH 0.7ML -- DERMABOND ADVANCED 12/BX

## (undated) DEVICE — GLOVE ORTHO 7 1/2   MSG9475

## (undated) DEVICE — GLOVE ORANGE PI 7 1/2   MSG9075

## (undated) DEVICE — 1200 GUARD II KIT W/5MM TUBE W/O VAC TUBE: Brand: GUARDIAN

## (undated) DEVICE — GUIDEWIRE ENDOSCP WRK L450CM DIA0.035IN STD SHFT STR RND

## (undated) DEVICE — SUTURE SZ 0 27IN 5/8 CIR UR-6  TAPER PT VIOLET ABSRB VICRYL J603H

## (undated) DEVICE — Device

## (undated) DEVICE — HYPODERMIC SAFETY NEEDLE: Brand: MAGELLAN

## (undated) DEVICE — CATHETER IV 14GA L2IN POLYUR STR ORNG HUB SFTY RADPQ DISP

## (undated) DEVICE — BASIN EMSIS 16OZ GRAPHITE PLAS KID SHP MOLD GRAD FOR ORAL

## (undated) DEVICE — SOL IRR SOD CL 0.9% 1000ML BTL --

## (undated) DEVICE — C-ARM: Brand: UNBRANDED

## (undated) DEVICE — SYR 3ML LL TIP 1/10ML GRAD --

## (undated) DEVICE — LAPAROSCOPIC TROCAR SLEEVE/SINGLE USE: Brand: KII® OPTICAL ACCESS SYSTEM

## (undated) DEVICE — TROCAR SITE CLOSURE DEVICE: Brand: ENDO CLOSE

## (undated) DEVICE — YANKAUER,TAPERED BULBOUS TIP,W/O VENT: Brand: MEDLINE

## (undated) DEVICE — DECANTER BTL 6IN: Brand: MEDLINE INDUSTRIES, INC.

## (undated) DEVICE — BLOCK BITE ENDOSCP AD 21 MM W/ DIL BLU LF DISP

## (undated) DEVICE — SOLUTION IRRIG 500ML 0.9% SOD CHLO USP POUR PLAS BTL

## (undated) DEVICE — SET ADMIN 16ML TBNG L100IN 2 Y INJ SITE IV PIGGY BK DISP (ORDER IN MULIPLES OF 48)

## (undated) DEVICE — CATH IV AUTOGRD BC PNK 20GA 25 -- INSYTE

## (undated) DEVICE — APPLIER CLP M/L SHFT DIA5MM 15 LIG LIGAMAX 5

## (undated) DEVICE — NON-REM POLYHESIVE PATIENT RETURN ELECTRODE: Brand: VALLEYLAB

## (undated) DEVICE — TROCAR: Brand: KII FIOS FIRST ENTRY

## (undated) DEVICE — SYR 20ML LL STRL LF --

## (undated) DEVICE — SUTURE MCRYL SZ 4-0 L27IN ABSRB UD L19MM PS-2 1/2 CIR PRIM Y426H

## (undated) DEVICE — CANN NASAL O2 CAPNOGRAPHY AD -- FILTERLINE

## (undated) DEVICE — CANNULATING SPHINCTEROTOME: Brand: JAGTOME™ REVOLUTION RX

## (undated) DEVICE — RETRIEVAL BALLOON CATHETER: Brand: EXTRACTOR™ PRO RX

## (undated) DEVICE — ELECTRODE,RADIOTRANSLUCENT,FOAM,5PK: Brand: MEDLINE

## (undated) DEVICE — Z DISCONTINUED PER MEDLINE LINE GAS SAMPLING O2/CO2 LNG AD 13 FT NSL W/ TBNG FILTERLINE

## (undated) DEVICE — ADULT SPO2 SENSOR: Brand: NELLCOR